# Patient Record
Sex: MALE | Race: WHITE | NOT HISPANIC OR LATINO | Employment: FULL TIME | URBAN - METROPOLITAN AREA
[De-identification: names, ages, dates, MRNs, and addresses within clinical notes are randomized per-mention and may not be internally consistent; named-entity substitution may affect disease eponyms.]

---

## 2017-01-24 ENCOUNTER — TELEPHONE (OUTPATIENT)
Dept: FAMILY MEDICINE | Facility: CLINIC | Age: 52
End: 2017-01-24

## 2017-01-24 ENCOUNTER — OFFICE VISIT (OUTPATIENT)
Dept: FAMILY MEDICINE | Facility: CLINIC | Age: 52
End: 2017-01-24
Payer: COMMERCIAL

## 2017-01-24 ENCOUNTER — LAB VISIT (OUTPATIENT)
Dept: LAB | Facility: HOSPITAL | Age: 52
End: 2017-01-24
Attending: NURSE PRACTITIONER
Payer: COMMERCIAL

## 2017-01-24 VITALS
HEART RATE: 70 BPM | TEMPERATURE: 98 F | HEIGHT: 71 IN | DIASTOLIC BLOOD PRESSURE: 116 MMHG | SYSTOLIC BLOOD PRESSURE: 213 MMHG | BODY MASS INDEX: 44.1 KG/M2 | WEIGHT: 315 LBS

## 2017-01-24 DIAGNOSIS — I10 ESSENTIAL HYPERTENSION: Primary | ICD-10-CM

## 2017-01-24 DIAGNOSIS — I10 ESSENTIAL HYPERTENSION: ICD-10-CM

## 2017-01-24 LAB
ALBUMIN SERPL BCP-MCNC: 4.1 G/DL
ALP SERPL-CCNC: 87 U/L
ALT SERPL W/O P-5'-P-CCNC: 31 U/L
ANION GAP SERPL CALC-SCNC: 12 MMOL/L
AST SERPL-CCNC: 24 U/L
BILIRUB SERPL-MCNC: 0.6 MG/DL
BUN SERPL-MCNC: 16 MG/DL
CALCIUM SERPL-MCNC: 9.8 MG/DL
CHLORIDE SERPL-SCNC: 104 MMOL/L
CO2 SERPL-SCNC: 26 MMOL/L
CREAT SERPL-MCNC: 1 MG/DL
EST. GFR  (AFRICAN AMERICAN): >60 ML/MIN/1.73 M^2
EST. GFR  (NON AFRICAN AMERICAN): >60 ML/MIN/1.73 M^2
GLUCOSE SERPL-MCNC: 93 MG/DL
POTASSIUM SERPL-SCNC: 4.5 MMOL/L
PROT SERPL-MCNC: 7.6 G/DL
SODIUM SERPL-SCNC: 142 MMOL/L

## 2017-01-24 PROCEDURE — 80053 COMPREHEN METABOLIC PANEL: CPT

## 2017-01-24 PROCEDURE — 36415 COLL VENOUS BLD VENIPUNCTURE: CPT | Mod: PO

## 2017-01-24 PROCEDURE — 99213 OFFICE O/P EST LOW 20 MIN: CPT | Mod: S$GLB,,, | Performed by: NURSE PRACTITIONER

## 2017-01-24 PROCEDURE — 3077F SYST BP >= 140 MM HG: CPT | Mod: S$GLB,,, | Performed by: NURSE PRACTITIONER

## 2017-01-24 PROCEDURE — 1159F MED LIST DOCD IN RCRD: CPT | Mod: S$GLB,,, | Performed by: NURSE PRACTITIONER

## 2017-01-24 PROCEDURE — 99999 PR PBB SHADOW E&M-EST. PATIENT-LVL III: CPT | Mod: PBBFAC,,, | Performed by: NURSE PRACTITIONER

## 2017-01-24 PROCEDURE — 3080F DIAST BP >= 90 MM HG: CPT | Mod: S$GLB,,, | Performed by: NURSE PRACTITIONER

## 2017-01-24 RX ORDER — LISINOPRIL 40 MG/1
40 TABLET ORAL DAILY
Qty: 90 TABLET | Refills: 1 | Status: SHIPPED | OUTPATIENT
Start: 2017-01-24 | End: 2017-07-20 | Stop reason: SDUPTHER

## 2017-01-24 RX ORDER — CLONIDINE HYDROCHLORIDE 0.1 MG/1
0.2 TABLET ORAL
Status: DISCONTINUED | OUTPATIENT
Start: 2017-01-24 | End: 2017-02-10

## 2017-01-24 RX ORDER — ATENOLOL AND CHLORTHALIDONE TABLET 50; 25 MG/1; MG/1
1 TABLET ORAL DAILY
Qty: 30 TABLET | Refills: 11 | Status: SHIPPED | OUTPATIENT
Start: 2017-01-24 | End: 2017-02-10 | Stop reason: DRUGHIGH

## 2017-01-24 NOTE — PROGRESS NOTES
Tried to administer Clonidine 0.2 mg to patient; he refused, stating this blood pressure is regular for him.

## 2017-01-24 NOTE — TELEPHONE ENCOUNTER
----- Message from Muriel Castrejon sent at 1/24/2017  4:57 PM CST -----  Contact: SELF [phone]  Patient stopped by the clinic and stated that his lisinopril was not refilled and he is out. Patient needs this as soon as possible. Please call [phone] with questions.

## 2017-01-24 NOTE — PATIENT INSTRUCTIONS
Controlling High Blood Pressure  High blood pressure (hypertension) is often called the silent killer. This is because many people who have it dont know it. High blood pressure is defined as 140/90 mm Hg or higher. Know your blood pressure and remember to check it regularly. Doing so can save your life. Here are some things you can do to help control your blood pressure.    Choose heart-healthy foods  · Select low-salt, low-fat foods. Limit sodium intake to 2,400 mg per day or the amount suggested by your healthcare provider.  · Limit canned, dried, cured, packaged, and fast foods. These can contain a lot of salt.  · Eat 8 to 10 servings of fruits and vegetables every day.  · Choose lean meats, fish, or chicken.  · Eat whole-grain pasta, brown rice, and beans.  · Eat 2 to 3 servings of low-fat or fat-free dairy products.  · Ask your doctor about the DASH eating plan. This plan helps reduce blood pressure.  · When you go to a restaurant, ask that your meal be prepared with no added salt.  Maintain a healthy weight  · Ask your healthcare provider how many calories to eat a day. Then stick to that number.  · Ask your healthcare provider what weight range is healthiest for you. If you are overweight, a weight loss of only 3% to 5% of your body weight can help lower blood pressure. Generally, a good weight loss goal is to lose 10% of your body weight in a year.  · Limit snacks and sweets.  · Get regular exercise.  Get up and get active  · Choose activities you enjoy. Find ones you can do with friends or family. This includes bicycling, dancing, walking, and jogging.  · Park farther away from building entrances.  · Use stairs instead of the elevator.  · When you can, walk or bike instead of driving.  · [place] leaves, garden, or do household repairs.  · Be active at a moderate to vigorous level of physical activity for at least 40 minutes for a minimum of 3 to 4 days a week.   Manage stress  · Make time to relax and enjoy  life. Find time to laugh.  · Communicate your concerns with your loved ones and your healthcare provider.  · Visit with family and friends, and keep up with hobbies.  Limit alcohol and quit smoking  · Men should have no more than 2 drinks per day.  · Women should have no more than 1 drink per day.  · Talk with your healthcare provider about quitting smoking. Smoking significantly increases your risk for heart disease and stroke. Ask your healthcare provider about community smoking cessation programs and other options.  Medicines  If lifestyle changes arent enough, your healthcare provider may prescribe high blood pressure medicine. Take all medicines as prescribed. If you have any questions about your medicines, ask your healthcare provider before stopping or changing them.   © 8804-7331 [employer]. [address] [place] [address], [place] [address]. All rights reserved. This information is not intended as a substitute for professional medical care. Always follow your healthcare professional's instructions.        Weight Management: Getting Started  Healthy bodies come in all shapes and sizes. Not all bodies are made to be thin. For some people, a healthy weight is higher than the average weight listed on weight charts. Your healthcare provider can help you decide on a healthy weight for you.    Reasons to lose weight  Losing weight can help with some health problems, such as high blood pressure, heart disease, diabetes, sleep apnea, and arthritis. You may also feel more energy.  Set your long-term goal  Your goal doesn't even have to be a specific weight. You may decide on a fitness goal (such as being able to walk 10 miles a week), or a health goal (such as lowering your blood pressure). Choose a goal that is measurable and reasonable, so you know when you've reached it. A goal of reaching a BMI of less than 25 is not always reasonable (or possible).   Make an action plan  Habits dont change overnight.  Setting your goals too high can leave you feeling discouraged if you cant reach them. Be realistic. Choose one or two small changes you can make now. Set an action plan for how you are going to make these changes. When you can stick to this plan, keep making a few more small changes. Taking small steps will help you stay on the path to success.  Track your progress  Write down your goals. Then, keep a daily record of your progress. Write down what you eat and how active you are. This record lets you look back on how much youve done. It may also help when youre feeling frustrated. Reward yourself for success. Even if you dont reach every goal, give yourself credit for what you do get done.  Get support  Encouragement from others can help make losing weight easier. Ask your family members and friends for support. They may even want to join you. Also look to your healthcare provider, registered dietitian, and  for help. Your local hospital can give you more information about nutrition, exercise, and weight loss.  © 7022-2496 [employer]. [address] [place] [address], [place] [address]. All rights reserved. This information is not intended as a substitute for professional medical care. Always follow your healthcare professional's instructions.        Walking for Fitness  Fitness walking has something for everyone, even people who are already fit. Walking is one of the safest ways to condition your body aerobically. It can boost energy, help you lose weight, and reduce stress.    Physical benefits  · Walking strengthens your heart and lungs, and tones your muscles.  · When walking, your feet land with less impact than in other sports. This reduces chances of muscle, bone, and joint injury.  · Regular walking improves your cholesterol levels and lowers your risk of heart disease. And it helps you control your blood sugar if you have diabetes.  · Walking is a weight-bearing activity, which  helps maintain bone density. This can help prevent osteoporosis.  Personal rewards  · Taking walks can help you relax and manage stress. And fitness walking may make you feel better about yourself.  · Walking can help you sleep better at night and make you less likely to be depressed.  · Regular walking may help maintain your memory as you get older.  · Walking is a great way to spend extra time with friends and family members. Be sure to invite your dog along!  Q&A about fitness walking  Q: Will walking keep me fit?  A: Yes. Regular walking at the right pace gives you all the benefits of other aerobic activities, such as jogging and swimming.  Q: Will walking help me lose weight and keep it off?  A: Yes. Per mile, walking can burn as many calories as jogging. Your health care provider can help work walking into your weight-loss plan.  Q: Is walking safe for my health?  A: Yes. Walking is safe if you have high blood pressure, diabetes, heart disease, or other conditions. Talk to your health care provider before you start.  © 0172-7633 [employer]. [address] [place] [address]. All rights reserved. This information is not intended as a substitute for professional medical care. Always follow your healthcare professional's instructions.        Low-Salt Diet  This diet removes foods that are high in salt. It also limits the amount of salt you use when cooking. It is most often used for people with high blood pressure, edema (fluid retention), and kidney, liver, or heart disease.  Table salt contains the mineral sodium. Your body needs sodium to work normally. But too much sodium can make your health problems worse. Your healthcare provider is recommending a low-salt (also called low-sodium) diet for you. Your total daily allowance of salt is 1,500 to 2,300 milligrams (mg). It is less than 1 teaspoon of table salt. This means you can have only about 500 to 700 mg of sodium at each meal. People  with certain health problems should limit salt intake to the lower end of the recommended range.    When you cook, dont add much salt. If you can cook without using salt, even better. Dont add salt to your food at the table.  When shopping, read food labels. Salt is often called sodium on the label. Choose foods that are salt-free, low salt, or very low salt. Note that foods with reduced salt may not lower your salt intake enough.    Beans, potatoes, and pasta  Ok: Dry beans, split peas, lentils, potatoes, rice, macaroni, pasta, spaghetti without added salt  Avoid: Potato chips, tortilla chips, and similar products  Breads and cereals  Ok: Low-sodium breads, rolls, cereals, and cakes; low-salt crackers, matzo crackers  Avoid: Salted crackers, pretzels, popcorn, Filipino toast, pancakes, muffins  Dairy  Ok: Milk, chocolate milk, hot chocolate mix, low-salt cheeses, and yogurt  Avoid: Processed cheese and cheese spreads; Roquefort, Camembert, and cottage cheese; buttermilk, instant breakfast drink  Desserts  Ok: Ice cream, frozen yogurt, juice bars, gelatin, cookies and pies, sugar, honey, jelly, hard candy  Avoid: Most pies, cakes and cookies prepared or processed with salt; instant pudding  Drinks  Ok: Tea, coffee, fizzy (carbonated) drinks, juices  Avoid: Flavored coffees, electrolyte replacement drinks, sports drinks  Meats  Ok: All fresh meat, fish, poultry, low-salt tuna, eggs, egg substitute  Avoid: Smoked, pickled, brine-cured, or salted meats and fish. This includes naqvi, chipped beef, corned beef, hot dogs, deli meats, ham, kosher meats, salt pork, sausage, canned tuna, salted codfish, smoked salmon, herring, sardines, or anchovies.  Seasonings and spices  Ok: Most seasonings are okay. Good substitutes for salt include: fresh herb blends, hot sauce, lemon, garlic, ross, vinegar, dry mustard, parsley, cilantro, horseradish, tomato paste, regular margarine, mayonnaise, unsalted butter, cream cheese,  vegetable oil, cream, low-salt salad dressing and gravy.  Avoid: Regular ketchup, relishes, pickles, soy sauce, teriyaki sauce, Worcestershire sauce, BBQ sauce, tartar sauce, meat tenderizer, chili sauce, regular gravy, regular salad dressing, salted butter  Soups  Ok: Low-salt soups and broths made with allowed foods  Avoid: Bouillon cubes, soups with smoked or salted meats, regular soup and broth  Vegetables  Ok: Most vegetables are okay; also low-salt tomato and vegetable juices  Avoid: Sauerkraut and other brine-soaked vegetables; pickles and other pickled vegetables; tomato juice, olives  © 2323-8344 [employer]. [address] [place] [address], [place] [address]. All rights reserved. This information is not intended as a substitute for professional medical care. Always follow your healthcare professional's instructions.

## 2017-01-24 NOTE — PROGRESS NOTES
"Subjective:       Patient ID: Ron Shipman is a 51 y.o. male.    Chief Complaint: Hypertension    HPI Comments: Mr. Shipman presents to the clinic today for follow up for hypertension which has been present since age 18.  He is accompanied by significant other.  He has severely elevated blood pressure and denies any chest pain, blurry vision, headache.  He does have occasional leg swelling.  He eats hot dogs and sauerkraut, lasagna.  He has an active job but does no routine exercise.  He had CMP at last visit and kidney function was good.  He states today his blood pressure is "good" for him.  He has prescription for furosemide and has not been taking this because it was not helping the leg swelling.  He states he has had "dye shot through  Me" for blood pressure in the past and he has seen a Cardiologist.  He has not seen a Nephrologist.  He has cut out alcohol during the week.      Review of Systems   Constitutional: Negative for chills, fatigue and fever.   HENT: Negative for congestion, ear pain and sinus pressure.    Eyes: Negative for visual disturbance.   Respiratory: Negative for cough, shortness of breath and wheezing.    Cardiovascular: Positive for leg swelling (occasional). Negative for chest pain and palpitations.   Gastrointestinal: Negative for abdominal pain, constipation and diarrhea.   Neurological: Negative for dizziness, light-headedness and numbness.       Objective:      Physical Exam   Constitutional: He is oriented to person, place, and time. He appears well-developed and well-nourished. No distress.   HENT:   Head: Normocephalic and atraumatic.   Right Ear: External ear normal.   Left Ear: External ear normal.   Mouth/Throat: Oropharynx is clear and moist. No oropharyngeal exudate.   Eyes: Pupils are equal, round, and reactive to light. Right eye exhibits no discharge. Left eye exhibits no discharge.   Neck: Neck supple. No thyromegaly present.   Cardiovascular: Normal rate and regular " rhythm.  Exam reveals no gallop and no friction rub.    No murmur heard.  No lower extremity edema   Pulmonary/Chest: Effort normal and breath sounds normal. No respiratory distress. He has no wheezes. He has no rales.   Lymphadenopathy:     He has no cervical adenopathy.   Neurological: He is alert and oriented to person, place, and time. Coordination normal.   Skin: Skin is warm and dry.   Psychiatric: He has a normal mood and affect. His behavior is normal. Thought content normal.   Vitals reviewed.          Current Outpatient Prescriptions:     esomeprazole (NEXIUM) 40 MG capsule, Take 40 mg by mouth before breakfast., Disp: , Rfl:     lisinopril (PRINIVIL,ZESTRIL) 40 MG tablet, Take 1 tablet (40 mg total) by mouth once daily., Disp: 90 tablet, Rfl: 1    pravastatin (PRAVACHOL) 40 MG tablet, Take 40 mg by mouth once daily., Disp: , Rfl:     atenolol-chlorthalidone (TENORETIC) 50-25 mg Tab, Take 1 tablet by mouth once daily., Disp: 30 tablet, Rfl: 11    Current Facility-Administered Medications:     cloNIDine tablet 0.2 mg, 0.2 mg, Oral, 1 time in Clinic/HOD, Yue Lou, FNP-C  Assessment:       1. Essential hypertension        Plan:     Essential hypertension  Patient is unconcerned his blood pressure is so high.  Patient was told multiple times during visit that this severity of high blood pressure could cause heart attack, stroke, kidney disease, death.  He does not want to take the clonidine in the office today because his blood pressure is lower than usual.  He was advised his blood pressure goal is <140/90.  He still refuses.  DASH diet instructions given verbally and via handout.    -     atenolol-chlorthalidone (TENORETIC) 50-25 mg Tab; Take 1 tablet by mouth once daily.  Dispense: 30 tablet; Refill: 11  -     cloNIDine tablet 0.2 mg; Take 2 tablets (0.2 mg total) by mouth one time.  -     Comprehensive metabolic panel; Future; Expected date: 1/24/17  -     Ambulatory referral to Outpatient Case  Management

## 2017-01-24 NOTE — TELEPHONE ENCOUNTER
----- Message from Haley Durham sent at 1/24/2017  4:48 PM CST -----  Pt called stated that he was just seen and his BP meds was suppose to be called and it was not at the pharmacy/pls call back at [phone]

## 2017-01-24 NOTE — MR AVS SNAPSHOT
[facility]  [address] [place] [address]  Phone: [phone]  Fax: [phone]                  Ron Shipman   2017 2:20 PM   Office Visit    Description:  Male : 1965   Provider:  VANESA Melara   Department:  [facility]           Reason for Visit     Hypertension           Diagnoses this Visit        Comments    Essential hypertension    -  Primary            To Do List           Future Appointments        Provider Department Dept Phone    2/10/2017 4:00 PM VANESA Melara [facility] [phone]    2017 3:40 PM Liss Raza MD [facility] [phone]      Goals (5 Years of Data)     None      Follow-Up and Disposition     Return for labs now.       These Medications        Disp Refills Start End    atenolol-chlorthalidone (TENORETIC) 50-25 mg Tab 30 tablet 11 2017    Take 1 tablet by mouth once daily. - Oral    Pharmacy: [facility]/pharmacy #[id number] - [place] - [address] Ph #: [phone]         [facility] On Call     [facility] On Call Nurse [facility] -  Assistance  Registered nurses in the [facility] On Call Center provide clinical advisement, health education, appointment booking, and other advisory services.  Call for this free service at [phone].             Medications           Message regarding Medications     Verify the changes and/or additions to your medication regime listed below are the same as discussed with your clinician today.  If any of these changes or additions are incorrect, please notify your healthcare provider.        START taking these NEW medications        Refills    atenolol-chlorthalidone (TENORETIC) 50-25 mg Tab 11    Sig: Take 1 tablet by mouth once daily.    Class: Normal    Route: Oral      These medications were administered today        Dose Freq    cloNIDine tablet 0.2 mg 0.2 mg Clinic/HOD 1 time    Sig: Take 2 tablets (0.2 mg total) by  "mouth one time.    Class: Normal    Route: Oral      STOP taking these medications     atenolol (TENORMIN) 25 MG tablet Take 1 tablet (25 mg total) by mouth once daily.           Verify that the below list of medications is an accurate representation of the medications you are currently taking.  If none reported, the list may be blank. If incorrect, please contact your healthcare provider. Carry this list with you in case of emergency.           Current Medications     esomeprazole (NEXIUM) 40 MG capsule Take 40 mg by mouth before breakfast.    lisinopril (PRINIVIL,ZESTRIL) 40 MG tablet Take 1 tablet (40 mg total) by mouth once daily.    pravastatin (PRAVACHOL) 40 MG tablet Take 40 mg by mouth once daily.    atenolol-chlorthalidone (TENORETIC) 50-25 mg Tab Take 1 tablet by mouth once daily.           Clinical Reference Information           Vital Signs - Last Recorded  Most recent update: 1/24/2017  2:25 PM by Sima Saavedra MA    BP Pulse Temp Ht Wt BMI    (!) 213/116 (BP Location: Right arm, Patient Position: Sitting, BP Method: Automatic) 70 97.8 °F (36.6 °C) (Oral) 5' 11" (1.803 m) (!) 143 kg (315 lb 4.1 oz) 43.97 kg/m2      Blood Pressure          Most Recent Value    BP  (!)  213/116      Allergies as of 1/24/2017     Tetanus Vaccines And Toxoid      Immunizations Administered on Date of Encounter - 1/24/2017     None      Orders Placed During Today's Visit      Normal Orders This Visit    Ambulatory referral to Outpatient Case Management     Future Labs/Procedures Expected by Expires    Comprehensive metabolic panel  1/24/2017 3/25/2018      Instructions      Controlling High Blood Pressure  High blood pressure (hypertension) is often called the silent killer. This is because many people who have it dont know it. High blood pressure is defined as 140/90 mm Hg or higher. Know your blood pressure and remember to check it regularly. Doing so can save your life. Here are some things you can do to help control " your blood pressure.    Choose heart-healthy foods  · Select low-salt, low-fat foods. Limit sodium intake to 2,400 mg per day or the amount suggested by your healthcare provider.  · Limit canned, dried, cured, packaged, and fast foods. These can contain a lot of salt.  · Eat 8 to 10 servings of fruits and vegetables every day.  · Choose lean meats, fish, or chicken.  · Eat whole-grain pasta, brown rice, and beans.  · Eat 2 to 3 servings of low-fat or fat-free dairy products.  · Ask your doctor about the DASH eating plan. This plan helps reduce blood pressure.  · When you go to a restaurant, ask that your meal be prepared with no added salt.  Maintain a healthy weight  · Ask your healthcare provider how many calories to eat a day. Then stick to that number.  · Ask your healthcare provider what weight range is healthiest for you. If you are overweight, a weight loss of only 3% to 5% of your body weight can help lower blood pressure. Generally, a good weight loss goal is to lose 10% of your body weight in a year.  · Limit snacks and sweets.  · Get regular exercise.  Get up and get active  · Choose activities you enjoy. Find ones you can do with friends or family. This includes bicycling, dancing, walking, and jogging.  · Park farther away from building entrances.  · Use stairs instead of the elevator.  · When you can, walk or bike instead of driving.  · [place] leaves, garden, or do household repairs.  · Be active at a moderate to vigorous level of physical activity for at least 40 minutes for a minimum of 3 to 4 days a week.   Manage stress  · Make time to relax and enjoy life. Find time to laugh.  · Communicate your concerns with your loved ones and your healthcare provider.  · Visit with family and friends, and keep up with hobbies.  Limit alcohol and quit smoking  · Men should have no more than 2 drinks per day.  · Women should have no more than 1 drink per day.  · Talk with your healthcare provider about quitting  smoking. Smoking significantly increases your risk for heart disease and stroke. Ask your healthcare provider about community smoking cessation programs and other options.  Medicines  If lifestyle changes arent enough, your healthcare provider may prescribe high blood pressure medicine. Take all medicines as prescribed. If you have any questions about your medicines, ask your healthcare provider before stopping or changing them.   © 4135-2961 [employer]. [address] [place] [address], [place] [address]. All rights reserved. This information is not intended as a substitute for professional medical care. Always follow your healthcare professional's instructions.        Weight Management: Getting Started  Healthy bodies come in all shapes and sizes. Not all bodies are made to be thin. For some people, a healthy weight is higher than the average weight listed on weight charts. Your healthcare provider can help you decide on a healthy weight for you.    Reasons to lose weight  Losing weight can help with some health problems, such as high blood pressure, heart disease, diabetes, sleep apnea, and arthritis. You may also feel more energy.  Set your long-term goal  Your goal doesn't even have to be a specific weight. You may decide on a fitness goal (such as being able to walk 10 miles a week), or a health goal (such as lowering your blood pressure). Choose a goal that is measurable and reasonable, so you know when you've reached it. A goal of reaching a BMI of less than 25 is not always reasonable (or possible).   Make an action plan  Habits dont change overnight. Setting your goals too high can leave you feeling discouraged if you cant reach them. Be realistic. Choose one or two small changes you can make now. Set an action plan for how you are going to make these changes. When you can stick to this plan, keep making a few more small changes. Taking small steps will help you stay on the path to success.  Track  your progress  Write down your goals. Then, keep a daily record of your progress. Write down what you eat and how active you are. This record lets you look back on how much youve done. It may also help when youre feeling frustrated. Reward yourself for success. Even if you dont reach every goal, give yourself credit for what you do get done.  Get support  Encouragement from others can help make losing weight easier. Ask your family members and friends for support. They may even want to join you. Also look to your healthcare provider, registered dietitian, and  for help. Your local hospital can give you more information about nutrition, exercise, and weight loss.  © 9591-8398 [employer]. [address] [place] [address], [place] [address]. All rights reserved. This information is not intended as a substitute for professional medical care. Always follow your healthcare professional's instructions.        Walking for Fitness  Fitness walking has something for everyone, even people who are already fit. Walking is one of the safest ways to condition your body aerobically. It can boost energy, help you lose weight, and reduce stress.    Physical benefits  · Walking strengthens your heart and lungs, and tones your muscles.  · When walking, your feet land with less impact than in other sports. This reduces chances of muscle, bone, and joint injury.  · Regular walking improves your cholesterol levels and lowers your risk of heart disease. And it helps you control your blood sugar if you have diabetes.  · Walking is a weight-bearing activity, which helps maintain bone density. This can help prevent osteoporosis.  Personal rewards  · Taking walks can help you relax and manage stress. And fitness walking may make you feel better about yourself.  · Walking can help you sleep better at night and make you less likely to be depressed.  · Regular walking may help maintain your memory as you get  older.  · Walking is a great way to spend extra time with friends and family members. Be sure to invite your dog along!  Q&A about fitness walking  Q: Will walking keep me fit?  A: Yes. Regular walking at the right pace gives you all the benefits of other aerobic activities, such as jogging and swimming.  Q: Will walking help me lose weight and keep it off?  A: Yes. Per mile, walking can burn as many calories as jogging. Your health care provider can help work walking into your weight-loss plan.  Q: Is walking safe for my health?  A: Yes. Walking is safe if you have high blood pressure, diabetes, heart disease, or other conditions. Talk to your health care provider before you start.  © 1155-9554 [employer]. [address] [place] [address], [place] [address]. All rights reserved. This information is not intended as a substitute for professional medical care. Always follow your healthcare professional's instructions.        Low-Salt Diet  This diet removes foods that are high in salt. It also limits the amount of salt you use when cooking. It is most often used for people with high blood pressure, edema (fluid retention), and kidney, liver, or heart disease.  Table salt contains the mineral sodium. Your body needs sodium to work normally. But too much sodium can make your health problems worse. Your healthcare provider is recommending a low-salt (also called low-sodium) diet for you. Your total daily allowance of salt is 1,500 to 2,300 milligrams (mg). It is less than 1 teaspoon of table salt. This means you can have only about 500 to 700 mg of sodium at each meal. People with certain health problems should limit salt intake to the lower end of the recommended range.    When you cook, dont add much salt. If you can cook without using salt, even better. Dont add salt to your food at the table.  When shopping, read food labels. Salt is often called sodium on the label. Choose foods that are salt-free, low salt,  or very low salt. Note that foods with reduced salt may not lower your salt intake enough.    Beans, potatoes, and pasta  Ok: Dry beans, split peas, lentils, potatoes, rice, macaroni, pasta, spaghetti without added salt  Avoid: Potato chips, tortilla chips, and similar products  Breads and cereals  Ok: Low-sodium breads, rolls, cereals, and cakes; low-salt crackers, matzo crackers  Avoid: Salted crackers, pretzels, popcorn, [place] toast, pancakes, muffins  Dairy  Ok: Milk, chocolate milk, hot chocolate mix, low-salt cheeses, and yogurt  Avoid: Processed cheese and cheese spreads; Roquefort, Camembert, and cottage cheese; buttermilk, instant breakfast drink  Desserts  Ok: Ice cream, frozen yogurt, juice bars, gelatin, cookies and pies, sugar, honey, jelly, hard candy  Avoid: Most pies, cakes and cookies prepared or processed with salt; instant pudding  Drinks  Ok: Tea, coffee, fizzy (carbonated) drinks, juices  Avoid: Flavored coffees, electrolyte replacement drinks, sports drinks  Meats  Ok: All fresh meat, fish, poultry, low-salt tuna, eggs, egg substitute  Avoid: Smoked, pickled, brine-cured, or salted meats and fish. This includes naqvi, chipped beef, corned beef, hot dogs, deli meats, ham, kosher meats, salt pork, sausage, canned tuna, salted codfish, smoked salmon, herring, sardines, or anchovies.  Seasonings and spices  Ok: Most seasonings are okay. Good substitutes for salt include: fresh herb blends, hot sauce, lemon, garlic, ross, vinegar, dry mustard, parsley, cilantro, horseradish, tomato paste, regular margarine, mayonnaise, unsalted butter, cream cheese, vegetable oil, cream, low-salt salad dressing and gravy.  Avoid: Regular ketchup, relishes, pickles, soy sauce, teriyaki sauce, Worcestershire sauce, BBQ sauce, tartar sauce, meat tenderizer, chili sauce, regular gravy, regular salad dressing, salted butter  Soups  Ok: Low-salt soups and broths made with allowed foods  Avoid: Bouillon cubes, soups with  smoked or salted meats, regular soup and broth  Vegetables  Ok: Most vegetables are okay; also low-salt tomato and vegetable juices  Avoid: Sauerkraut and other brine-soaked vegetables; pickles and other pickled vegetables; tomato juice, olives  © 8789-9707 [employer]. [address] [place] [address]. All rights reserved. This information is not intended as a substitute for professional medical care. Always follow your healthcare professional's instructions.

## 2017-01-25 ENCOUNTER — OUTPATIENT CASE MANAGEMENT (OUTPATIENT)
Dept: ADMINISTRATIVE | Facility: OTHER | Age: 52
End: 2017-01-25

## 2017-01-26 ENCOUNTER — TELEPHONE (OUTPATIENT)
Dept: FAMILY MEDICINE | Facility: CLINIC | Age: 52
End: 2017-01-26

## 2017-01-26 NOTE — TELEPHONE ENCOUNTER
----- Message from Kena Diamond RN sent at 1/25/2017  8:28 PM CST -----  Please note the following patient has been assigned to Abhijit NI with Outpatient Complex Care Mgmt for screening.      Please contact [facility] at ext [phone] with questions.     Thank you.  Kena Diamond

## 2017-01-26 NOTE — PROGRESS NOTES
Please note the following patient has been assigned to Abhijit NI with Outpatient Complex Care Mgmt for screening.      Please contact [facility] at ext [phone] with questions.     Thank you.  Kena Diamond

## 2017-02-10 ENCOUNTER — OFFICE VISIT (OUTPATIENT)
Dept: FAMILY MEDICINE | Facility: CLINIC | Age: 52
End: 2017-02-10
Payer: COMMERCIAL

## 2017-02-10 VITALS
TEMPERATURE: 98 F | SYSTOLIC BLOOD PRESSURE: 152 MMHG | HEART RATE: 86 BPM | DIASTOLIC BLOOD PRESSURE: 90 MMHG | HEIGHT: 71 IN | BODY MASS INDEX: 44.1 KG/M2 | WEIGHT: 315 LBS

## 2017-02-10 DIAGNOSIS — F10.10 ALCOHOL ABUSE, DAILY USE: ICD-10-CM

## 2017-02-10 DIAGNOSIS — I10 ESSENTIAL HYPERTENSION: Primary | ICD-10-CM

## 2017-02-10 DIAGNOSIS — E66.01 MORBID OBESITY DUE TO EXCESS CALORIES: ICD-10-CM

## 2017-02-10 PROCEDURE — 3078F DIAST BP <80 MM HG: CPT | Mod: S$GLB,,, | Performed by: NURSE PRACTITIONER

## 2017-02-10 PROCEDURE — 99999 PR PBB SHADOW E&M-EST. PATIENT-LVL III: CPT | Mod: PBBFAC,,, | Performed by: NURSE PRACTITIONER

## 2017-02-10 PROCEDURE — 3077F SYST BP >= 140 MM HG: CPT | Mod: S$GLB,,, | Performed by: NURSE PRACTITIONER

## 2017-02-10 PROCEDURE — 99213 OFFICE O/P EST LOW 20 MIN: CPT | Mod: S$GLB,,, | Performed by: NURSE PRACTITIONER

## 2017-02-10 RX ORDER — ATENOLOL AND CHLORTHALIDONE TABLET 100; 25 MG/1; MG/1
1 TABLET ORAL DAILY
Qty: 30 TABLET | Refills: 11 | Status: SHIPPED | OUTPATIENT
Start: 2017-02-10 | End: 2017-04-21 | Stop reason: SDUPTHER

## 2017-02-10 NOTE — PATIENT INSTRUCTIONS

## 2017-02-10 NOTE — MR AVS SNAPSHOT
[facility]  [address] [place] [address]  Phone: [phone]  Fax: [phone]                  Ron Shipman   2/10/2017 4:00 PM   Office Visit    Description:  Male : 1965   Provider:  VANESA Melara   Department:  [facility]           Reason for Visit     Hypertension           Diagnoses this Visit        Comments    Essential hypertension                To Do List           Future Appointments        Provider Department Dept Phone    3/10/2017 3:00 PM VANESA Melara [facility] [phone]    2017 3:40 PM Liss Raza MD [facility] [phone]      Goals (5 Years of Data)     None      Follow-Up and Disposition     Return in about 4 weeks (around 3/10/2017) for nurse visit bp .       These Medications        Disp Refills Start End    atenolol-chlorthalidone (TENORETIC) 100-25 mg per tablet 30 tablet 11 2/10/2017 2/10/2018    Take 1 tablet by mouth once daily. - Oral    Pharmacy: [facility]/pharmacy #[id number] - [place] - [address] [facility] Ph #: [phone]         [facility] On Call     [facility] On Call Nurse Care Line -  Assistance  Registered nurses in the [facility] On Call Center provide clinical advisement, health education, appointment booking, and other advisory services.  Call for this free service at [phone].             Medications           Message regarding Medications     Verify the changes and/or additions to your medication regime listed below are the same as discussed with your clinician today.  If any of these changes or additions are incorrect, please notify your healthcare provider.        START taking these NEW medications        Refills    atenolol-chlorthalidone (TENORETIC) 100-25 mg per tablet 11    Sig: Take 1 tablet by mouth once daily.    Class: Normal    Route: Oral      STOP taking these medications     atenolol-chlorthalidone (TENORETIC) 50-25 mg Tab Take 1 tablet by  "mouth once daily.           Verify that the below list of medications is an accurate representation of the medications you are currently taking.  If none reported, the list may be blank. If incorrect, please contact your healthcare provider. Carry this list with you in case of emergency.           Current Medications     esomeprazole (NEXIUM) 40 MG capsule Take 40 mg by mouth before breakfast.    lisinopril (PRINIVIL,ZESTRIL) 40 MG tablet Take 1 tablet (40 mg total) by mouth once daily.    pravastatin (PRAVACHOL) 40 MG tablet Take 40 mg by mouth once daily.    atenolol-chlorthalidone (TENORETIC) 100-25 mg per tablet Take 1 tablet by mouth once daily.           Clinical Reference Information           Your Vitals Were     BP Pulse Temp Height Weight BMI    152/90 86 98.2 °F (36.8 °C) (Oral) 5' 11" (1.803 m) 144 kg (317 lb 7.4 oz) 44.28 kg/m2      Blood Pressure          Most Recent Value    BP  (!)  152/90      Allergies as of 2/10/2017     Tetanus Vaccines And Toxoid      Immunizations Administered on Date of Encounter - 2/10/2017     None      Instructions      Low-Salt Diet  This diet removes foods that are high in salt. It also limits the amount of salt you use when cooking. It is most often used for people with high blood pressure, edema (fluid retention), and kidney, liver, or heart disease.  Table salt contains the mineral sodium. Your body needs sodium to work normally. But too much sodium can make your health problems worse. Your healthcare provider is recommending a low-salt (also called low-sodium) diet for you. Your total daily allowance of salt is 1,500 to 2,300 milligrams (mg). It is less than 1 teaspoon of table salt. This means you can have only about 500 to 700 mg of sodium at each meal. People with certain health problems should limit salt intake to the lower end of the recommended range.    When you cook, dont add much salt. If you can cook without using salt, even better. Dont add salt to your " food at the table.  When shopping, read food labels. Salt is often called sodium on the label. Choose foods that are salt-free, low salt, or very low salt. Note that foods with reduced salt may not lower your salt intake enough.    Beans, potatoes, and pasta  Ok: Dry beans, split peas, lentils, potatoes, rice, macaroni, pasta, spaghetti without added salt  Avoid: Potato chips, tortilla chips, and similar products  Breads and cereals  Ok: Low-sodium breads, rolls, cereals, and cakes; low-salt crackers, matzo crackers  Avoid: Salted crackers, pretzels, popcorn, Yoruba toast, pancakes, muffins  Dairy  Ok: Milk, chocolate milk, hot chocolate mix, low-salt cheeses, and yogurt  Avoid: Processed cheese and cheese spreads; Roquefort, Camembert, and cottage cheese; buttermilk, instant breakfast drink  Desserts  Ok: Ice cream, frozen yogurt, juice bars, gelatin, cookies and pies, sugar, honey, jelly, hard candy  Avoid: Most pies, cakes and cookies prepared or processed with salt; instant pudding  Drinks  Ok: Tea, coffee, fizzy (carbonated) drinks, juices  Avoid: Flavored coffees, electrolyte replacement drinks, sports drinks  Meats  Ok: All fresh meat, fish, poultry, low-salt tuna, eggs, egg substitute  Avoid: Smoked, pickled, brine-cured, or salted meats and fish. This includes naqvi, chipped beef, corned beef, hot dogs, deli meats, ham, kosher meats, salt pork, sausage, canned tuna, salted codfish, smoked salmon, herring, sardines, or anchovies.  Seasonings and spices  Ok: Most seasonings are okay. Good substitutes for salt include: fresh herb blends, hot sauce, lemon, garlic, ross, vinegar, dry mustard, parsley, cilantro, horseradish, tomato paste, regular margarine, mayonnaise, unsalted butter, cream cheese, vegetable oil, cream, low-salt salad dressing and gravy.  Avoid: Regular ketchup, relishes, pickles, soy sauce, teriyaki sauce, Worcestershire sauce, BBQ sauce, tartar sauce, meat tenderizer, chili sauce, regular  gravy, regular salad dressing, salted butter  Soups  Ok: Low-salt soups and broths made with allowed foods  Avoid: Bouillon cubes, soups with smoked or salted meats, regular soup and broth  Vegetables  Ok: Most vegetables are okay; also low-salt tomato and vegetable juices  Avoid: Sauerkraut and other brine-soaked vegetables; pickles and other pickled vegetables; tomato juice, olives  Date Last Reviewed: 8/1/2016  © 4492-7464 [employer]. [address] [place] [address]. All rights reserved. This information is not intended as a substitute for professional medical care. Always follow your healthcare professional's instructions.             Language Assistance Services     ATTENTION: Language assistance services are available, free of charge. Please call [phone].      ATENCIÓN: Si rohan choi, tiene a rascon disposición servicios gratuitos de asistencia lingüística. Llame al [phone].     CHÚ Ý: N?u b?n nói Ti?ng Vi?t, có các d?ch v? h? tr? ngôn ng? mi?n phí dành cho b?n. G?i s? [phone].         [facility] complies with applicable Federal civil rights laws and does not discriminate on the basis of race, color, national origin, age, disability, or sex.

## 2017-02-10 NOTE — PROGRESS NOTES
Subjective:       Patient ID: Ron Shipman is a 51 y.o. male.    Chief Complaint: Hypertension    HPI Comments: Mr. Shipman presents to the clinic today for follow up for hypertension.  Blood pressure is much improved since last visit but still uncontrolled.  Patient has cut back alcohol intake to 2-3 glasses of whiskey/day.  He is eating high salt diet.  Yesterday he ate a hot dog and a hamburger.  Today he ate a roast beef poboy.  He denies chest pain or shortness of breath.  He does get headaches.  He refuses flu shot.      Hypertension   This is a chronic problem. The current episode started more than 1 year ago. The problem is uncontrolled. Associated symptoms include headaches and peripheral edema (occasional). Pertinent negatives include no anxiety, blurred vision, chest pain, palpitations or shortness of breath. Associated agents: alcohol. Risk factors for coronary artery disease include male gender and obesity. Past treatments include beta blockers, diuretics and ACE inhibitors. The current treatment provides moderate improvement. Compliance problems include diet and exercise.  There is no history of kidney disease, CAD/MI or CVA.     Review of Systems   Constitutional: Negative for chills, fatigue and fever.   HENT: Negative for congestion, ear pain and sinus pressure.    Eyes: Negative for blurred vision and visual disturbance.   Respiratory: Negative for cough, shortness of breath and wheezing.    Cardiovascular: Negative for chest pain, palpitations and leg swelling.   Gastrointestinal: Negative for abdominal pain, constipation and diarrhea.   Neurological: Positive for headaches. Negative for dizziness and weakness.       Objective:      Physical Exam   Constitutional: He is oriented to person, place, and time. He appears well-developed and well-nourished. No distress.   HENT:   Head: Normocephalic and atraumatic.   Right Ear: External ear normal.   Left Ear: External ear normal.   Mouth/Throat:  Oropharynx is clear and moist. No oropharyngeal exudate.   Eyes: Pupils are equal, round, and reactive to light. Right eye exhibits no discharge. Left eye exhibits no discharge.   Neck: Neck supple. No thyromegaly present.   Cardiovascular: Normal rate and regular rhythm.  Exam reveals no gallop and no friction rub.    No murmur heard.  No lower extremity edema   Pulmonary/Chest: Effort normal and breath sounds normal. No respiratory distress. He has no wheezes. He has no rales.   Abdominal: Soft. He exhibits no distension. There is no tenderness.   Lymphadenopathy:     He has no cervical adenopathy.   Neurological: He is alert and oriented to person, place, and time. Coordination normal.   Skin: Skin is warm and dry.   Psychiatric: He has a normal mood and affect. His behavior is normal. Thought content normal.   Vitals reviewed.          Current Outpatient Prescriptions:     esomeprazole (NEXIUM) 40 MG capsule, Take 40 mg by mouth before breakfast., Disp: , Rfl:     lisinopril (PRINIVIL,ZESTRIL) 40 MG tablet, Take 1 tablet (40 mg total) by mouth once daily., Disp: 90 tablet, Rfl: 1    pravastatin (PRAVACHOL) 40 MG tablet, Take 40 mg by mouth once daily., Disp: , Rfl:     atenolol-chlorthalidone (TENORETIC) 100-25 mg per tablet, Take 1 tablet by mouth once daily., Disp: 30 tablet, Rfl: 11    Current Facility-Administered Medications:     cloNIDine tablet 0.2 mg, 0.2 mg, Oral, 1 time in Clinic/[facility], [place] [facility]  Assessment:       1. Essential hypertension    2. Alcohol abuse, daily use    3. Morbid obesity due to excess calories        Plan:     Essential hypertension  -     atenolol-chlorthalidone (TENORETIC) 100-25 mg per tablet; Take 1 tablet by mouth once daily.  Dispense: 30 tablet; Refill: 11    Alcohol abuse, daily use   Cutting back.     Morbid obesity due to excess calories  DASH diet.   Patient readiness: acceptance and barriers:readiness    During the course of the visit the patient was  educated and counseled about the following:     Hypertension:   Dietary sodium restriction.  Obesity:   General weight loss/lifestyle modification strategies discussed (elicit support from others; identify saboteurs; non-food rewards, etc).    Goals: Hypertension: Reduce Blood Pressure and Obesity: Reduce calorie intake and BMI    Did patient meet goals/outcomes: No    The following self management tools provided: declined    Patient Instructions (the written plan) was given to the patient/family.     Time spent with patient: 15 minutes

## 2017-04-21 DIAGNOSIS — I10 ESSENTIAL HYPERTENSION: ICD-10-CM

## 2017-04-21 RX ORDER — ATENOLOL AND CHLORTHALIDONE TABLET 100; 25 MG/1; MG/1
1 TABLET ORAL DAILY
Qty: 90 TABLET | Refills: 3 | Status: SHIPPED | OUTPATIENT
Start: 2017-04-21 | End: 2018-01-24 | Stop reason: SDUPTHER

## 2017-04-28 ENCOUNTER — DOCUMENTATION ONLY (OUTPATIENT)
Dept: FAMILY MEDICINE | Facility: CLINIC | Age: 52
End: 2017-04-28

## 2017-04-28 NOTE — PROGRESS NOTES
Pre-Visit Chart Review  For Appointment Scheduled on 5/2/17.    Health Maintenance Due   Topic Date Due    TETANUS VACCINE  08/07/1984    Influenza Vaccine  08/01/2016    Colonoscopy  08/07/2016

## 2017-05-02 ENCOUNTER — OFFICE VISIT (OUTPATIENT)
Dept: FAMILY MEDICINE | Facility: CLINIC | Age: 52
End: 2017-05-02
Payer: COMMERCIAL

## 2017-05-02 VITALS
DIASTOLIC BLOOD PRESSURE: 71 MMHG | WEIGHT: 315 LBS | HEIGHT: 71 IN | HEART RATE: 83 BPM | SYSTOLIC BLOOD PRESSURE: 127 MMHG | TEMPERATURE: 98 F | BODY MASS INDEX: 44.1 KG/M2

## 2017-05-02 DIAGNOSIS — Z82.49 FAMILY HISTORY OF PREMATURE CAD: ICD-10-CM

## 2017-05-02 DIAGNOSIS — R06.83 SNORING: ICD-10-CM

## 2017-05-02 DIAGNOSIS — R53.83 FATIGUE, UNSPECIFIED TYPE: ICD-10-CM

## 2017-05-02 DIAGNOSIS — E78.2 HYPERLIPIDEMIA, MIXED: ICD-10-CM

## 2017-05-02 DIAGNOSIS — E66.01 MORBID OBESITY DUE TO EXCESS CALORIES: ICD-10-CM

## 2017-05-02 DIAGNOSIS — R73.9 HYPERGLYCEMIA: ICD-10-CM

## 2017-05-02 DIAGNOSIS — I10 ESSENTIAL HYPERTENSION: Primary | ICD-10-CM

## 2017-05-02 DIAGNOSIS — H10.9 CONJUNCTIVITIS OF RIGHT EYE, UNSPECIFIED CONJUNCTIVITIS TYPE: ICD-10-CM

## 2017-05-02 PROCEDURE — 3078F DIAST BP <80 MM HG: CPT | Mod: S$GLB,,, | Performed by: FAMILY MEDICINE

## 2017-05-02 PROCEDURE — 3074F SYST BP LT 130 MM HG: CPT | Mod: S$GLB,,, | Performed by: FAMILY MEDICINE

## 2017-05-02 PROCEDURE — 99214 OFFICE O/P EST MOD 30 MIN: CPT | Mod: 25,S$GLB,, | Performed by: FAMILY MEDICINE

## 2017-05-02 PROCEDURE — 1160F RVW MEDS BY RX/DR IN RCRD: CPT | Mod: S$GLB,,, | Performed by: FAMILY MEDICINE

## 2017-05-02 PROCEDURE — 99999 PR PBB SHADOW E&M-EST. PATIENT-LVL III: CPT | Mod: PBBFAC,,, | Performed by: FAMILY MEDICINE

## 2017-05-02 RX ORDER — ZOLPIDEM TARTRATE 10 MG/1
10 TABLET ORAL NIGHTLY PRN
Qty: 30 TABLET | Refills: 1 | Status: SHIPPED | OUTPATIENT
Start: 2017-05-02 | End: 2019-07-16

## 2017-05-02 RX ORDER — POLYMYXIN B SULFATE AND TRIMETHOPRIM 1; 10000 MG/ML; [USP'U]/ML
1 SOLUTION OPHTHALMIC 4 TIMES DAILY
Qty: 10 ML | Refills: 0 | Status: SHIPPED | OUTPATIENT
Start: 2017-05-02 | End: 2017-05-09

## 2017-05-02 NOTE — PROGRESS NOTES
CHIEF COMPLAINT:  Establish care      HISTORY OF PRESENT ILLNESS:  Be Shipman is a 51 y.o. male who presents to clinic as a new patient to me to establish care. He has HTN and is on tenoretic and lisinopril. He denies any cough, CP, edema. He has hyperlipidemia and is on pravastatin. He denies any myalgia, dark colored urine. He has hyperglycemia and is due for repeat FBG, HgA1c.  He states that on Monday he developed diffuse redness over his right eye with yellow crusting and watering. He has been using visine with minimal improvement. He denies any vision changes, eye pain. He is also concerned about excessive daytime fatigue. He states that he will fall asleep at work and has to take naps. He states that he snores. He does have difficulty staying asleep. He has never had a sleep study. He has never been on a sleep aid.       REVIEW OF SYSTEMS:   The patient denies any fever, chills, night sweats, headaches, vision changes, difficulty speaking or swallowing, decreased hearing, weight loss, weight gain, chest pain, palpitations, shortness of breath, cough, nausea, vomiting, abdominal pain, dysuria, diarrhea, constipation, hematuria, hematochezia, melena, changes in his hair, skin, nails, numbness or weakness in his extremities, erythema, swelling or pain over any of his joints, myalgia, swollen glands, easy bruising,edema. He denies any scrotal/testicular masses, penile discharge. He denies any symptoms of anxiety or depression.    MEDICATIONS:   Reviewed and/or reconciled in EPIC    ALLERGIES:  Reviewed and/or reconciled in EPIC    PAST MEDICAL/SURGICAL HISTORY:   Past Medical History:   Diagnosis Date    Hyperlipidemia     Hypertension       Past Surgical History:   Procedure Laterality Date    HAND SURGERY Left     20 y/o    HERNIA REPAIR         FAMILY HISTORY:    Family History   Problem Relation Age of Onset    Heart disease Mother     Hypertension Mother     Cancer Sister      breast    Heart  "disease Sister     Heart disease Brother     Stroke Brother     Heart disease Brother     Heart disease Brother        SOCIAL HISTORY:    Social History     Social History    Marital status:      Spouse name: N/A    Number of children: N/A    Years of education: N/A     Occupational History    Not on file.     Social History Main Topics    Smoking status: Former Smoker    Smokeless tobacco: Former User     Quit date: 7/22/2000    Alcohol use Yes      Comment: every day 1/2 a fifth of whiskey per day    Drug use: No    Sexual activity: Yes     Other Topics Concern    Not on file     Social History Narrative       PHYSICAL EXAM:  VITAL SIGNS:   Vitals:    05/02/17 1536   BP: 127/71   BP Location: Left arm   Patient Position: Sitting   BP Method: Automatic   Pulse: 83   Temp: 98.2 °F (36.8 °C)   TempSrc: Oral   Weight: (!) 145.7 kg (321 lb 3.4 oz)   Height: 5' 11" (1.803 m)     GENERAL:  Patient appears well nourished, sitting on exam table, in no acute distress.  HEENT:  Atraumatic, normocephalic, PERRLA, EOMI, diffuse right conjunctival injection,  sclerae are anicteric, there is yellow crusting over upper and lower eyelashes on the right. normal external auditory canals,TMs clear b/l, gross hearing intact to whisper, MMM, no oropharygneal erythema or exudate.  NECK:  Supple, normal ROM, trachea is midline , no supraclavicular or cervical LAD or masses palpated.  Thyroid gland not palpable.  CARDIOVASCULAR:  RRR, normal S1 and S2, no m/r/g.  RESPIRATORY:  CTA b/l, no wheezes, rhonchi, rales.  No increased work of breathing, no  use of accessory muscles.  ABDOMEN:  Soft, nontender, nondistended, normoactive bowel sounds in all four quadrants, no rebound or guarding, no HSM or masses palpated.  Normal percussion.  EXTREMITIES:  2+ DP pulses b/l, no edema.  SKIN:  Warm, no lesions on exposed skin.  NEUROMUSCULAR:  Cranial nerves II-XII grossly intact.  . No clubbing or cyanosis of digits/nails.  " Steady gait.  PSYCH:  Patient is alert and oriented to person, time, place. They are appropriately dressed and groomed. There is normal eye contact. Rate and tone of speech is normal. Normal insight, judgement. Normal thought content and process.     LABORATORY/IMAGING STUDIES: pending    ASSESSMENT/PLAN: This is a 51 y.o. male who presents to clinic for evaluation of the following concerns  1. HTN: see below  2. Obesity: see below  3. Hyperlipidemia: CMP, lipid panel  4. Hyperglycemia: FBG, HgA1c  5. Fatigue, snoring: CBC, CMP, TSH, testosterone level, sleep study. May use prn ambien, the risks/side effects of this medication were discussed with him.  6. Conjunctivitis: place on polytrim eye drops  7. Family history of PCAD: will need to address stress test at follow up visit.     Patient readiness: acceptance and barriers:none    During the course of the visit the patient was educated and counseled about the following:     Hypertension:   Medication: no change.  Obesity:   General weight loss/lifestyle modification strategies discussed (elicit support from others; identify saboteurs; non-food rewards, etc).  Diet interventions: moderate (500 kCal/d) deficit diet.  Informal exercise measures discussed, e.g. taking stairs instead of elevator.  Regular aerobic exercise program discussed.    Goals: Hypertension: Reduce Blood Pressure and Obesity: Reduce calorie intake and BMI    Did patient meet goals/outcomes: No    The following self management tools provided: declined    Patient Instructions (the written plan) was given to the patient/family.     Time spent with patient: 30 minutes        Liss Raza MD

## 2017-05-02 NOTE — MR AVS SNAPSHOT
[facility]  [address] [address]  Phone: [phone]  Fax: [phone]                  Be Shipman   2017 3:40 PM   Office Visit    Description:  Male : 1965   Provider:  Liss Raza MD   Department:  [facility]           Reason for Visit     Establish Care           Diagnoses this Visit        Comments    Essential hypertension    -  Primary     Morbid obesity due to excess calories         Hyperlipidemia, mixed         Hyperglycemia         Family history of premature CAD         Fatigue, unspecified type         Snoring                To Do List           Future Appointments        Provider Department Dept Phone    2017 8:00 AM [facility] [phone]    11/3/2017 3:00 PM Liss Raza MD [facility] [phone]      Goals (5 Years of Data)     None      Follow-Up and Disposition     Return in about 6 months (around 2017) for HTN.       These Medications        Disp Refills Start End    zolpidem (AMBIEN) 10 mg Tab 30 tablet 1 2017 10/31/2017    Take 1 tablet (10 mg total) by mouth nightly as needed. - Oral    Pharmacy: [facility]/pharmacy #[id number] - [place] - [address] Ph #: [phone]       polymyxin B sulf-trimethoprim (POLYTRIM) 10,000 unit- 1 mg/mL Drop 10 mL 0 2017    Place 1 drop into the right eye 4 (four) times daily. - Right Eye    Pharmacy: [facility]/pharmacy #[id number] - [place] - [address] Ph #: [phone]         [facility]     [facility] On [facility] Nurse Care Line - 24/ Assistance  Unless otherwise directed by your provider, please contact [facility], our nurse care line that is available for / assistance.     Registered nurses in the [facility] provide: appointment scheduling, clinical advisement, health education, and other advisory services.  Call: [phone] (toll free)               Medications           Message  "regarding Medications     Verify the changes and/or additions to your medication regime listed below are the same as discussed with your clinician today.  If any of these changes or additions are incorrect, please notify your healthcare provider.        START taking these NEW medications        Refills    zolpidem (AMBIEN) 10 mg Tab 1    Sig: Take 1 tablet (10 mg total) by mouth nightly as needed.    Class: Normal    Route: Oral    polymyxin B sulf-trimethoprim (POLYTRIM) 10,000 unit- 1 mg/mL Drop 0    Sig: Place 1 drop into the right eye 4 (four) times daily.    Class: Normal    Route: Right Eye           Verify that the below list of medications is an accurate representation of the medications you are currently taking.  If none reported, the list may be blank. If incorrect, please contact your healthcare provider. Carry this list with you in case of emergency.           Current Medications     atenolol-chlorthalidone (TENORETIC) 100-25 mg per tablet Take 1 tablet by mouth once daily.    esomeprazole (NEXIUM) 40 MG capsule Take 40 mg by mouth before breakfast.    lisinopril (PRINIVIL,ZESTRIL) 40 MG tablet Take 1 tablet (40 mg total) by mouth once daily.    pravastatin (PRAVACHOL) 40 MG tablet Take 40 mg by mouth once daily.    polymyxin B sulf-trimethoprim (POLYTRIM) 10,000 unit- 1 mg/mL Drop Place 1 drop into the right eye 4 (four) times daily.    zolpidem (AMBIEN) 10 mg Tab Take 1 tablet (10 mg total) by mouth nightly as needed.           Clinical Reference Information           Your Vitals Were     BP Pulse Temp    127/71 (BP Location: Left arm, Patient Position: Sitting, BP Method: Automatic) 83 98.2 °F (36.8 °C) (Oral)    Height Weight BMI    5' 11" (1.803 m) 145.7 kg (321 lb 3.4 oz) 44.8 kg/m2      Blood Pressure          Most Recent Value    BP  127/71      Allergies as of 5/2/2017     Tetanus Vaccines And Toxoid      Immunizations Administered on Date of Encounter - 5/2/2017     None      Orders Placed " During Today's Visit     Future Labs/Procedures Expected by Expires    CBC auto differential  5/2/2017 7/1/2018    Comprehensive metabolic panel  5/2/2017 7/1/2018    Ferritin  5/2/2017 7/1/2018    Folate  5/2/2017 7/1/2018    Hemoglobin A1c  5/2/2017 7/1/2018    Iron and TIBC  5/2/2017 7/1/2018    Lipid panel  5/2/2017 7/1/2018    T4, free  5/2/2017 7/1/2018    Testosterone Panel  5/2/2017 7/1/2018    TSH  5/2/2017 7/1/2018    Vitamin B12  5/2/2017 7/1/2018    Vitamin D  5/2/2017 7/1/2018    Polysomnogram (CPAP will be added if patient meets diagnostic criteria.)  As directed 5/2/2018      Language Assistance Services     ATTENTION: Language assistance services are available, free of charge. Please call [phone].      ATENCIÓN: Si habla español, tiene a rascon disposición servicios gratuitos de asistencia lingüística. Llame al [phone].     CHÚ Ý: N?u b?n nói Ti?ng Vi?t, có các d?ch v? h? tr? ngôn ng? mi?n phí dành cho b?n. G?i s? [phone].         [facility] complies with applicable Federal civil rights laws and does not discriminate on the basis of race, color, national origin, age, disability, or sex.

## 2017-05-06 ENCOUNTER — LAB VISIT (OUTPATIENT)
Dept: LAB | Facility: HOSPITAL | Age: 52
End: 2017-05-06
Attending: FAMILY MEDICINE
Payer: COMMERCIAL

## 2017-05-06 DIAGNOSIS — E78.2 HYPERLIPIDEMIA, MIXED: ICD-10-CM

## 2017-05-06 DIAGNOSIS — R53.83 FATIGUE, UNSPECIFIED TYPE: ICD-10-CM

## 2017-05-06 DIAGNOSIS — R73.9 HYPERGLYCEMIA: ICD-10-CM

## 2017-05-06 LAB
25(OH)D3+25(OH)D2 SERPL-MCNC: 28 NG/ML
ALBUMIN SERPL BCP-MCNC: 3.5 G/DL
ALP SERPL-CCNC: 79 U/L
ALT SERPL W/O P-5'-P-CCNC: 36 U/L
ANION GAP SERPL CALC-SCNC: 11 MMOL/L
AST SERPL-CCNC: 27 U/L
BASOPHILS # BLD AUTO: 0.04 K/UL
BASOPHILS NFR BLD: 0.5 %
BILIRUB SERPL-MCNC: 0.4 MG/DL
BUN SERPL-MCNC: 17 MG/DL
CALCIUM SERPL-MCNC: 9.6 MG/DL
CHLORIDE SERPL-SCNC: 104 MMOL/L
CHOLEST/HDLC SERPL: 8.1 {RATIO}
CO2 SERPL-SCNC: 28 MMOL/L
CREAT SERPL-MCNC: 1 MG/DL
DIFFERENTIAL METHOD: ABNORMAL
EOSINOPHIL # BLD AUTO: 0.2 K/UL
EOSINOPHIL NFR BLD: 2.2 %
ERYTHROCYTE [DISTWIDTH] IN BLOOD BY AUTOMATED COUNT: 13.9 %
EST. GFR  (AFRICAN AMERICAN): >60 ML/MIN/1.73 M^2
EST. GFR  (NON AFRICAN AMERICAN): >60 ML/MIN/1.73 M^2
FERRITIN SERPL-MCNC: 20 NG/ML
FOLATE SERPL-MCNC: 9.4 NG/ML
GLUCOSE SERPL-MCNC: 103 MG/DL
HCT VFR BLD AUTO: 42.8 %
HDL/CHOLESTEROL RATIO: 12.3 %
HDLC SERPL-MCNC: 260 MG/DL
HDLC SERPL-MCNC: 32 MG/DL
HGB BLD-MCNC: 13.7 G/DL
IRON SERPL-MCNC: 41 UG/DL
LDLC SERPL CALC-MCNC: 153.2 MG/DL
LYMPHOCYTES # BLD AUTO: 2.6 K/UL
LYMPHOCYTES NFR BLD: 34.6 %
MCH RBC QN AUTO: 30.3 PG
MCHC RBC AUTO-ENTMCNC: 32 %
MCV RBC AUTO: 95 FL
MONOCYTES # BLD AUTO: 0.7 K/UL
MONOCYTES NFR BLD: 9.6 %
NEUTROPHILS # BLD AUTO: 3.9 K/UL
NEUTROPHILS NFR BLD: 52.7 %
NONHDLC SERPL-MCNC: 228 MG/DL
PLATELET # BLD AUTO: 227 K/UL
PMV BLD AUTO: 10.9 FL
POTASSIUM SERPL-SCNC: 4.1 MMOL/L
PROT SERPL-MCNC: 7 G/DL
RBC # BLD AUTO: 4.52 M/UL
SATURATED IRON: 7 %
SODIUM SERPL-SCNC: 143 MMOL/L
T4 FREE SERPL-MCNC: 0.82 NG/DL
TOTAL IRON BINDING CAPACITY: 549 UG/DL
TRANSFERRIN SERPL-MCNC: 371 MG/DL
TRIGL SERPL-MCNC: 374 MG/DL
TSH SERPL DL<=0.005 MIU/L-ACNC: 1.64 UIU/ML
VIT B12 SERPL-MCNC: 360 PG/ML
WBC # BLD AUTO: 7.42 K/UL

## 2017-05-06 PROCEDURE — 80061 LIPID PANEL: CPT

## 2017-05-06 PROCEDURE — 82306 VITAMIN D 25 HYDROXY: CPT

## 2017-05-06 PROCEDURE — 84443 ASSAY THYROID STIM HORMONE: CPT

## 2017-05-06 PROCEDURE — 82728 ASSAY OF FERRITIN: CPT

## 2017-05-06 PROCEDURE — 83540 ASSAY OF IRON: CPT

## 2017-05-06 PROCEDURE — 85025 COMPLETE CBC W/AUTO DIFF WBC: CPT

## 2017-05-06 PROCEDURE — 84270 ASSAY OF SEX HORMONE GLOBUL: CPT

## 2017-05-06 PROCEDURE — 82746 ASSAY OF FOLIC ACID SERUM: CPT

## 2017-05-06 PROCEDURE — 80053 COMPREHEN METABOLIC PANEL: CPT

## 2017-05-06 PROCEDURE — 83036 HEMOGLOBIN GLYCOSYLATED A1C: CPT

## 2017-05-06 PROCEDURE — 84439 ASSAY OF FREE THYROXINE: CPT

## 2017-05-06 PROCEDURE — 36415 COLL VENOUS BLD VENIPUNCTURE: CPT | Mod: PO

## 2017-05-06 PROCEDURE — 82607 VITAMIN B-12: CPT

## 2017-05-08 LAB
ESTIMATED AVG GLUCOSE: 134 MG/DL
HBA1C MFR BLD HPLC: 6.3 %

## 2017-05-11 LAB
ALBUMIN SERPL-MCNC: 4 G/DL (ref 3.6–5.1)
SHBG SERPL-SCNC: 19 NMOL/L (ref 10–50)
TESTOST FREE SERPL-MCNC: 34 PG/ML (ref 46–224)
TESTOST SERPL-MCNC: 178 NG/DL (ref 250–1100)
TESTOSTERONE.FREE+WB SERPL-MCNC: 62.6 NG/DL (ref 110–575)

## 2017-06-06 ENCOUNTER — TELEPHONE (OUTPATIENT)
Dept: FAMILY MEDICINE | Facility: CLINIC | Age: 52
End: 2017-06-06

## 2017-06-06 NOTE — TELEPHONE ENCOUNTER
Returned patient's call and offered appt today with Tena. Patient had went to a walk in clinic already.

## 2017-06-06 NOTE — TELEPHONE ENCOUNTER
----- Message from Claudia Leiva sent at 6/6/2017  9:07 AM CDT -----  Patient is calling to get a same day appointment because his tonsils are swollen, hard to swallow. He is not feeling good and does not feel like he can wait until tomorrow. Please call with availability at [phone].

## 2017-06-18 ENCOUNTER — TELEPHONE (OUTPATIENT)
Dept: FAMILY MEDICINE | Facility: CLINIC | Age: 52
End: 2017-06-18

## 2017-06-18 DIAGNOSIS — E55.9 VITAMIN D DEFICIENCY: Primary | ICD-10-CM

## 2017-06-18 DIAGNOSIS — R79.89 LOW TESTOSTERONE: ICD-10-CM

## 2017-06-18 DIAGNOSIS — D50.9 IRON DEFICIENCY ANEMIA, UNSPECIFIED IRON DEFICIENCY ANEMIA TYPE: ICD-10-CM

## 2017-06-18 RX ORDER — ASPIRIN 325 MG
50000 TABLET, DELAYED RELEASE (ENTERIC COATED) ORAL WEEKLY
Qty: 8 CAPSULE | Refills: 0 | Status: SHIPPED | OUTPATIENT
Start: 2017-06-18 | End: 2017-08-07

## 2017-06-18 NOTE — TELEPHONE ENCOUNTER
The vitamin D level is low. I am starting high dose vitamin D supplementation for 8 weeks to be followed with a repeat vitamin D level.    The testosterone level was low. Needs additional testosterone level checked at 8 am fasting.    There is mild anemia and iron levels are low. Needs repeat anemia labs, UA, hemoccult to look for blood loss.     His blood sugar and cholesterol are still high. Needs to eat low carb, low fat diet, work on weight loss and we will rechekc this in 6 months.       All of this can be causing fatigue.

## 2017-06-19 NOTE — TELEPHONE ENCOUNTER
Patient notified and states understanding,lab appointment scheduled.notified patient to stop by office to  hemoccult cards

## 2017-06-24 ENCOUNTER — LAB VISIT (OUTPATIENT)
Dept: LAB | Facility: HOSPITAL | Age: 52
End: 2017-06-24
Attending: FAMILY MEDICINE
Payer: COMMERCIAL

## 2017-06-24 DIAGNOSIS — R79.89 LOW TESTOSTERONE: ICD-10-CM

## 2017-06-24 DIAGNOSIS — D50.9 IRON DEFICIENCY ANEMIA, UNSPECIFIED IRON DEFICIENCY ANEMIA TYPE: ICD-10-CM

## 2017-06-24 LAB
BASOPHILS # BLD AUTO: 0.03 K/UL
BASOPHILS NFR BLD: 0.6 %
DIFFERENTIAL METHOD: ABNORMAL
EOSINOPHIL # BLD AUTO: 0.1 K/UL
EOSINOPHIL NFR BLD: 2 %
ERYTHROCYTE [DISTWIDTH] IN BLOOD BY AUTOMATED COUNT: 13.3 %
FERRITIN SERPL-MCNC: 17 NG/ML
HCT VFR BLD AUTO: 39.6 %
HGB BLD-MCNC: 12.9 G/DL
IRON SERPL-MCNC: 40 UG/DL
LYMPHOCYTES # BLD AUTO: 2.2 K/UL
LYMPHOCYTES NFR BLD: 42.9 %
MCH RBC QN AUTO: 29.9 PG
MCHC RBC AUTO-ENTMCNC: 32.6 %
MCV RBC AUTO: 92 FL
MONOCYTES # BLD AUTO: 0.6 K/UL
MONOCYTES NFR BLD: 12.5 %
NEUTROPHILS # BLD AUTO: 2.1 K/UL
NEUTROPHILS NFR BLD: 41.8 %
PLATELET # BLD AUTO: 237 K/UL
PMV BLD AUTO: 10.7 FL
RBC # BLD AUTO: 4.31 M/UL
SATURATED IRON: 7 %
TOTAL IRON BINDING CAPACITY: 556 UG/DL
TRANSFERRIN SERPL-MCNC: 376 MG/DL
WBC # BLD AUTO: 5.06 K/UL

## 2017-06-24 PROCEDURE — 85025 COMPLETE CBC W/AUTO DIFF WBC: CPT

## 2017-06-24 PROCEDURE — 83540 ASSAY OF IRON: CPT

## 2017-06-24 PROCEDURE — 82728 ASSAY OF FERRITIN: CPT

## 2017-06-24 PROCEDURE — 84466 ASSAY OF TRANSFERRIN: CPT

## 2017-06-24 PROCEDURE — 84270 ASSAY OF SEX HORMONE GLOBUL: CPT

## 2017-06-24 PROCEDURE — 36415 COLL VENOUS BLD VENIPUNCTURE: CPT | Mod: PO

## 2017-06-30 LAB
ALBUMIN SERPL-MCNC: 4.2 G/DL (ref 3.6–5.1)
SHBG SERPL-SCNC: 21 NMOL/L (ref 10–50)
TESTOST FREE SERPL-MCNC: 30.2 PG/ML (ref 46–224)
TESTOST SERPL-MCNC: 173 NG/DL (ref 250–1100)
TESTOSTERONE.FREE+WB SERPL-MCNC: 58.1 NG/DL (ref 110–575)

## 2017-07-20 DIAGNOSIS — I10 ESSENTIAL HYPERTENSION: ICD-10-CM

## 2017-07-20 RX ORDER — LISINOPRIL 40 MG/1
40 TABLET ORAL DAILY
Qty: 90 TABLET | Refills: 3 | Status: SHIPPED | OUTPATIENT
Start: 2017-07-20 | End: 2018-07-24 | Stop reason: SDUPTHER

## 2017-08-02 ENCOUNTER — OFFICE VISIT (OUTPATIENT)
Dept: FAMILY MEDICINE | Facility: CLINIC | Age: 52
End: 2017-08-02
Payer: COMMERCIAL

## 2017-08-02 VITALS
HEART RATE: 116 BPM | DIASTOLIC BLOOD PRESSURE: 82 MMHG | TEMPERATURE: 98 F | BODY MASS INDEX: 42.22 KG/M2 | WEIGHT: 301.56 LBS | HEIGHT: 71 IN | SYSTOLIC BLOOD PRESSURE: 130 MMHG

## 2017-08-02 DIAGNOSIS — R42 VERTIGO: ICD-10-CM

## 2017-08-02 DIAGNOSIS — J32.4 PANSINUSITIS, UNSPECIFIED CHRONICITY: Primary | ICD-10-CM

## 2017-08-02 PROCEDURE — 99999 PR PBB SHADOW E&M-EST. PATIENT-LVL III: CPT | Mod: PBBFAC,,, | Performed by: FAMILY MEDICINE

## 2017-08-02 PROCEDURE — 96372 THER/PROPH/DIAG INJ SC/IM: CPT | Mod: S$GLB,,, | Performed by: FAMILY MEDICINE

## 2017-08-02 PROCEDURE — 99214 OFFICE O/P EST MOD 30 MIN: CPT | Mod: 25,S$GLB,, | Performed by: FAMILY MEDICINE

## 2017-08-02 RX ORDER — BETAMETHASONE SODIUM PHOSPHATE AND BETAMETHASONE ACETATE 3; 3 MG/ML; MG/ML
12 INJECTION, SUSPENSION INTRA-ARTICULAR; INTRALESIONAL; INTRAMUSCULAR; SOFT TISSUE
Status: COMPLETED | OUTPATIENT
Start: 2017-08-02 | End: 2017-08-02

## 2017-08-02 RX ORDER — MECLIZINE HYDROCHLORIDE 25 MG/1
25 TABLET ORAL
Qty: 40 TABLET | Refills: 0 | Status: SHIPPED | OUTPATIENT
Start: 2017-08-02 | End: 2019-02-15

## 2017-08-02 RX ORDER — DOXYCYCLINE 100 MG/1
100 CAPSULE ORAL 2 TIMES DAILY
Qty: 20 CAPSULE | Refills: 0 | Status: SHIPPED | OUTPATIENT
Start: 2017-08-02 | End: 2019-02-15

## 2017-08-02 RX ADMIN — BETAMETHASONE SODIUM PHOSPHATE AND BETAMETHASONE ACETATE 12 MG: 3; 3 INJECTION, SUSPENSION INTRA-ARTICULAR; INTRALESIONAL; INTRAMUSCULAR; SOFT TISSUE at 03:08

## 2017-08-02 NOTE — PATIENT INSTRUCTIONS
Weight Management: Getting Started  Healthy bodies come in all shapes and sizes. Not all bodies are made to be thin. For some people, a healthy weight is higher than the average weight listed on weight charts. Your healthcare provider can help you decide on a healthy weight for you.    Reasons to lose weight  Losing weight can help with some health problems, such as high blood pressure, heart disease, diabetes, sleep apnea, and arthritis. You may also feel more energy.  Set your long-term goal  Your goal doesn't even have to be a specific weight. You may decide on a fitness goal (such as being able to walk 10 miles a week), or a health goal (such as lowering your blood pressure). Choose a goal that is measurable and reasonable, so you know when you've reached it. A goal of reaching a BMI of less than 25 is not always reasonable (or possible).   Make an action plan  Habits dont change overnight. Setting your goals too high can leave you feeling discouraged if you cant reach them. Be realistic. Choose one or two small changes you can make now. Set an action plan for how you are going to make these changes. When you can stick to this plan, keep making a few more small changes. Taking small steps will help you stay on the path to success.  Track your progress  Write down your goals. Then, keep a daily record of your progress. Write down what you eat and how active you are. This record lets you look back on how much youve done. It may also help when youre feeling frustrated. Reward yourself for success. Even if you dont reach every goal, give yourself credit for what you do get done.  Get support  Encouragement from others can help make losing weight easier. Ask your family members and friends for support. They may even want to join you. Also look to your healthcare provider, registered dietitian, and  for help. Your local hospital can give you more information about nutrition, exercise, and  weight loss.  Date Last Reviewed: 1/31/2016 © 2000-2016 [employer]. [address] [place] [address], [place] [address]. All rights reserved. This information is not intended as a substitute for professional medical care. Always follow your healthcare professional's instructions.        Walking for Fitness  Fitness walking has something for everyone, even people who are already fit. Walking is one of the safest ways to condition your body aerobically. It can boost energy, help you lose weight, and reduce stress.    Physical benefits  · Walking strengthens your heart and lungs, and tones your muscles.  · When walking, your feet land with less impact than in other sports. This reduces chances of muscle, bone, and joint injury.  · Regular walking improves your cholesterol levels and lowers your risk of heart disease. And it helps you control your blood sugar if you have diabetes.  · Walking is a weight-bearing activity, which helps maintain bone density. This can help prevent osteoporosis.  Personal rewards  · Taking walks can help you relax and manage stress. And fitness walking may make you feel better about yourself.  · Walking can help you sleep better at night and make you less likely to be depressed.  · Regular walking may help maintain your memory as you get older.  · Walking is a great way to spend extra time with friends and family members. Be sure to invite your dog along!  Q&A about fitness walking  Q: Will walking keep me fit?  A: Yes. Regular walking at the right pace gives you all the benefits of other aerobic activities, such as jogging and swimming.  Q: Will walking help me lose weight and keep it off?  A: Yes. Per mile, walking can burn as many calories as jogging. Your health care provider can help work walking into your weight-loss plan.  Q: Is walking safe for my health?  A: Yes. Walking is safe if you have high blood pressure, diabetes, heart disease, or other conditions. Talk to your health  care provider before you start.  Date Last Reviewed: 5/9/2015  © 7468-3936 [employer]. [address] [place] [address], [place] [address]. All rights reserved. This information is not intended as a substitute for professional medical care. Always follow your healthcare professional's instructions.

## 2017-08-02 NOTE — PROGRESS NOTES
Subjective:       Patient ID: Be Shipman is a 51 y.o. male.    Chief Complaint: Dizziness and Nausea    Dizziness:   Chronicity:  New  Onset:  1 to 4 weeks ago  Progression since onset:  Waxing and waning  Duration:  Very brief  Dizziness characteristics:  Sensation of movement and off-balance   Associated symptoms: ear congestion, ear pain and nausea.no fever, no syncope and no chest pain.  Nausea   Associated symptoms include congestion, nausea and a sore throat. Pertinent negatives include no abdominal pain, chest pain, fever, numbness or rash.   Sinusitis   This is a new problem. The current episode started 1 to 4 weeks ago. The problem has been waxing and waning since onset. There has been no fever. Associated symptoms include congestion, ear pain, sinus pressure and a sore throat. Pertinent negatives include no shortness of breath.     Review of Systems   Constitutional: Negative for fever.   HENT: Positive for congestion, ear pain, sinus pressure and sore throat.    Respiratory: Negative for shortness of breath.    Cardiovascular: Negative for chest pain and syncope.   Gastrointestinal: Positive for nausea. Negative for abdominal pain.   Skin: Negative for rash.   Neurological: Positive for dizziness. Negative for numbness.   All other systems reviewed and are negative.      Objective:      Physical Exam   Constitutional: He appears well-developed. No distress.   HENT:   Right Ear: Tympanic membrane is bulging. A middle ear effusion is present.   Left Ear: Tympanic membrane is bulging. A middle ear effusion is present.   Nose: Mucosal edema present. Right sinus exhibits maxillary sinus tenderness and frontal sinus tenderness. Left sinus exhibits maxillary sinus tenderness and frontal sinus tenderness.   Mouth/Throat: Posterior oropharyngeal erythema present.   Neck: Neck supple.   Cardiovascular: Normal rate and regular rhythm.    No murmur heard.  Pulmonary/Chest: Effort normal and breath sounds  normal.   Lymphadenopathy:     He has no cervical adenopathy.   Neurological: He has normal strength. No cranial nerve deficit. Gait normal.   Skin: Skin is warm and dry.       Assessment:       1. Pansinusitis, unspecified chronicity    2. Vertigo        Plan:         Be was seen today for dizziness and nausea.    Diagnoses and all orders for this visit:    Pansinusitis, unspecified chronicity    Vertigo    Other orders  -     betamethasone acetate-betamethasone sodium phosphate injection 12 mg; Inject 2 mLs (12 mg total) into the muscle one time.  -     doxycycline (MONODOX) 100 MG capsule; Take 1 capsule (100 mg total) by mouth 2 (two) times daily.  -     meclizine (ANTIVERT) 25 mg tablet; Take 1 tablet (25 mg total) by mouth taper from 4 doses each day to 1 dose and stop. Start taper after dizziness clears

## 2017-08-02 NOTE — PROGRESS NOTES
2 patient identifiers used ( name &  ).  Administered 12 mg Celestone RUOQG IM.  Patient tolerated well.  No bleeding at insertion site noted.  Pain scale 0/10.  Allergies reviewed.    Aseptic technique maintained

## 2017-08-05 ENCOUNTER — TELEPHONE (OUTPATIENT)
Dept: FAMILY MEDICINE | Facility: CLINIC | Age: 52
End: 2017-08-05

## 2017-08-05 DIAGNOSIS — D50.9 IRON DEFICIENCY ANEMIA, UNSPECIFIED IRON DEFICIENCY ANEMIA TYPE: Primary | ICD-10-CM

## 2017-08-05 DIAGNOSIS — R79.89 LOW TESTOSTERONE: ICD-10-CM

## 2017-08-05 NOTE — TELEPHONE ENCOUNTER
There is iron deficiency anemia. He does not appear to have dropped off hemoccults. He needs to see GI for evaluation of possible GI blood loss which can cause the anemia. Needs to take ferrous sulfate 325 mg TID with vitamin C and call us to schedule repeat labs in 3 months. This can be causing his fatigue.      Testosterone is still low. Needs repeat testosterone, 8 am fasting cortisol level, LH, FSH, T4 and prolactin levels. If no significant abnormalities can consider testosterone supplementation

## 2017-08-08 ENCOUNTER — TELEPHONE (OUTPATIENT)
Dept: FAMILY MEDICINE | Facility: CLINIC | Age: 52
End: 2017-08-08

## 2017-08-08 NOTE — TELEPHONE ENCOUNTER
----- Message from Bri Zayas sent at 8/8/2017  3:35 PM CDT -----  Contact: self  Returning your call.  Please call back at [phone] (home)

## 2017-08-08 NOTE — TELEPHONE ENCOUNTER
Patient notified of results and instructed to start iron supplement (ferrous sulfate 325mg) and vitamin C OTC. Patient states he didn't do the hemoccult cards and advised to get supplies from clinic and for further instructions on collection. Also attempted to schedule appt with GI and labs. Patient refused to schedule any appointments at this time.

## 2017-08-14 ENCOUNTER — DOCUMENTATION ONLY (OUTPATIENT)
Dept: FAMILY MEDICINE | Facility: CLINIC | Age: 52
End: 2017-08-14

## 2017-08-14 NOTE — PROGRESS NOTES
Pre-Visit Chart Review  For Appointment Scheduled on 08/15/2017    Health Maintenance Due   Topic Date Due    Colonoscopy  08/07/2016    Influenza Vaccine  08/01/2017

## 2017-08-24 DIAGNOSIS — Z12.11 COLON CANCER SCREENING: ICD-10-CM

## 2017-10-31 ENCOUNTER — DOCUMENTATION ONLY (OUTPATIENT)
Dept: FAMILY MEDICINE | Facility: CLINIC | Age: 52
End: 2017-10-31

## 2017-10-31 NOTE — PROGRESS NOTES
Pre-Visit Chart Review  For Appointment Scheduled on 11/3/17.    Health Maintenance Due   Topic Date Due    Colonoscopy  08/07/2016    Influenza Vaccine  08/01/2017

## 2018-01-24 DIAGNOSIS — I10 ESSENTIAL HYPERTENSION: ICD-10-CM

## 2018-01-24 RX ORDER — ATENOLOL AND CHLORTHALIDONE TABLET 100; 25 MG/1; MG/1
1 TABLET ORAL DAILY
Qty: 90 TABLET | Refills: 1 | Status: SHIPPED | OUTPATIENT
Start: 2018-01-24 | End: 2018-07-24 | Stop reason: SDUPTHER

## 2018-07-24 DIAGNOSIS — I10 ESSENTIAL HYPERTENSION: ICD-10-CM

## 2018-07-24 RX ORDER — LISINOPRIL 40 MG/1
TABLET ORAL
Qty: 90 TABLET | Refills: 3 | Status: SHIPPED | OUTPATIENT
Start: 2018-07-24 | End: 2019-07-16

## 2018-07-25 RX ORDER — ATENOLOL AND CHLORTHALIDONE TABLET 100; 25 MG/1; MG/1
1 TABLET ORAL DAILY
Qty: 90 TABLET | Refills: 1 | Status: SHIPPED | OUTPATIENT
Start: 2018-07-25 | End: 2019-07-16

## 2019-01-21 DIAGNOSIS — I10 ESSENTIAL HYPERTENSION: ICD-10-CM

## 2019-01-21 RX ORDER — ATENOLOL AND CHLORTHALIDONE TABLET 100; 25 MG/1; MG/1
TABLET ORAL
Qty: 90 TABLET | Refills: 1 | OUTPATIENT
Start: 2019-01-21

## 2019-02-15 ENCOUNTER — OFFICE VISIT (OUTPATIENT)
Dept: PODIATRY | Facility: CLINIC | Age: 54
End: 2019-02-15
Payer: COMMERCIAL

## 2019-02-15 VITALS
HEART RATE: 92 BPM | HEIGHT: 71 IN | DIASTOLIC BLOOD PRESSURE: 133 MMHG | BODY MASS INDEX: 44.1 KG/M2 | SYSTOLIC BLOOD PRESSURE: 212 MMHG | WEIGHT: 315 LBS | TEMPERATURE: 99 F

## 2019-02-15 DIAGNOSIS — S93.402A MODERATE LEFT ANKLE SPRAIN, INITIAL ENCOUNTER: ICD-10-CM

## 2019-02-15 DIAGNOSIS — M25.572 ACUTE LEFT ANKLE PAIN: Primary | ICD-10-CM

## 2019-02-15 DIAGNOSIS — M76.62 ACHILLES TENDINITIS OF LEFT LOWER EXTREMITY: ICD-10-CM

## 2019-02-15 PROCEDURE — 73630 PR  X-RAY FOOT 3+ VW: ICD-10-PCS | Mod: LT,,, | Performed by: PODIATRIST

## 2019-02-15 PROCEDURE — 3077F PR MOST RECENT SYSTOLIC BLOOD PRESSURE >= 140 MM HG: ICD-10-PCS | Mod: ,,, | Performed by: PODIATRIST

## 2019-02-15 PROCEDURE — 3077F SYST BP >= 140 MM HG: CPT | Mod: ,,, | Performed by: PODIATRIST

## 2019-02-15 PROCEDURE — 3080F PR MOST RECENT DIASTOLIC BLOOD PRESSURE >= 90 MM HG: ICD-10-PCS | Mod: ,,, | Performed by: PODIATRIST

## 2019-02-15 PROCEDURE — 99203 OFFICE O/P NEW LOW 30 MIN: CPT | Mod: 25,,, | Performed by: PODIATRIST

## 2019-02-15 PROCEDURE — 73610 PR  X-RAY ANKLE 3+ VW: ICD-10-PCS | Mod: LT,,, | Performed by: PODIATRIST

## 2019-02-15 PROCEDURE — 3080F DIAST BP >= 90 MM HG: CPT | Mod: ,,, | Performed by: PODIATRIST

## 2019-02-15 PROCEDURE — 3008F PR BODY MASS INDEX (BMI) DOCUMENTED: ICD-10-PCS | Mod: ,,, | Performed by: PODIATRIST

## 2019-02-15 PROCEDURE — 99203 PR OFFICE/OUTPT VISIT, NEW, LEVL III, 30-44 MIN: ICD-10-PCS | Mod: 25,,, | Performed by: PODIATRIST

## 2019-02-15 PROCEDURE — 73610 X-RAY EXAM OF ANKLE: CPT | Mod: LT,,, | Performed by: PODIATRIST

## 2019-02-15 PROCEDURE — 73630 X-RAY EXAM OF FOOT: CPT | Mod: LT,,, | Performed by: PODIATRIST

## 2019-02-15 PROCEDURE — 3008F BODY MASS INDEX DOCD: CPT | Mod: ,,, | Performed by: PODIATRIST

## 2019-02-15 RX ORDER — NAPROXEN 500 MG/1
500 TABLET ORAL 2 TIMES DAILY
Qty: 90 TABLET | Refills: 1 | Status: SHIPPED | OUTPATIENT
Start: 2019-02-15 | End: 2019-08-08 | Stop reason: CLARIF

## 2019-02-15 RX ORDER — HYDROCODONE BITARTRATE AND ACETAMINOPHEN 10; 325 MG/1; MG/1
1 TABLET ORAL EVERY 6 HOURS PRN
Qty: 30 TABLET | Refills: 0 | Status: SHIPPED | OUTPATIENT
Start: 2019-02-15 | End: 2019-03-01

## 2019-02-15 NOTE — PROGRESS NOTES
[address] [facility] [address]  [place] [address]  Phone: [phone]   [phone]    Patient's PCP:Liss Raza MD  Referring Provider: No ref. provider found    Subjective:      Chief Complaint:: Ankle Injury (left)    CHETAN Shipman is a 53 y.o. male who presents with a complaint of  Left ankle injury lasting for one day. Onset of the symptoms was stepping out of truck yesterday and falling.  Current symptoms include swelling and pain.  Aggravating factors are bearing weight. Symptoms have not improved since injury.Treatment to date have included icey hot. Patients rates pain 10/10 on pain scale. Patient presents to the office with a high blood pressure. Patient has been notified to see PCP Doctor: Liss Raza          Vitals:    02/15/19 1439   BP: (!) 212/133   Pulse:    Temp:      Shoe Size: 13    Past Surgical History:   Procedure Laterality Date    HAND SURGERY Left     20 y/o, laceration repair    HERNIA REPAIR      umbilical hernia     Past Medical History:   Diagnosis Date    Hyperlipidemia     Hypertension      Family History   Problem Relation Age of Onset    Heart disease Mother         CAD    Hypertension Mother     Cancer Sister         breast    Heart disease Sister 50    Heart disease Brother         MI    Stroke Brother     Heart disease Brother         MI    Heart disease Brother         MI        Social History:   Marital Status:   Alcohol History:  reports that he drinks alcohol.  Tobacco History:  reports that he has quit smoking. He quit smokeless tobacco use about 18 years ago.  Drug History:  reports that he does not use drugs.    Review of patient's allergies indicates:   Allergen Reactions    Tetanus vaccines and toxoid Anaphylaxis, Hives and Rash       Current Outpatient Medications   Medication Sig Dispense Refill    atenolol-chlorthalidone (TENORETIC) 100-25 mg per tablet TAKE 1 TABLET BY MOUTH ONCE DAILY. 90 tablet 1    esomeprazole (NEXIUM)  40 MG capsule Take 40 mg by mouth before breakfast.      lisinopril (PRINIVIL,ZESTRIL) 40 MG tablet TAKE ONE TABLET BY MOUTH ONCE DAILY 90 tablet 3    HYDROcodone-acetaminophen (NORCO)  mg per tablet Take 1 tablet by mouth every 6 (six) hours as needed for Pain. 30 tablet 0    naproxen (NAPROSYN) 500 MG tablet Take 1 tablet (500 mg total) by mouth 2 (two) times daily. 90 tablet 1    zolpidem (AMBIEN) 10 mg Tab Take 1 tablet (10 mg total) by mouth nightly as needed. 30 tablet 1     No current facility-administered medications for this visit.        Review of Systems   Constitutional: Negative for chills, fatigue, fever and unexpected weight change.   HENT: Negative for hearing loss and trouble swallowing.    Eyes: Negative for photophobia and visual disturbance.   Respiratory: Negative for cough, shortness of breath and wheezing.    Cardiovascular: Positive for leg swelling. Negative for chest pain and palpitations.   Gastrointestinal: Positive for nausea. Negative for abdominal pain.   Genitourinary: Negative for dysuria and frequency.   Musculoskeletal: Negative for arthralgias, back pain and joint swelling.   Skin: Negative for rash.   Neurological: Negative for tremors, seizures, weakness, numbness and headaches.   Hematological: Does not bruise/bleed easily.         Objective:        Physical Exam:   Foot Exam    General  General Appearance: appears stated age and healthy   Orientation: alert and oriented to person, place, and time   Affect: appropriate   Gait: steppage       Left Foot/Ankle      Inspection and Palpation  Ecchymosis: none  Tenderness: (Pain entire length of the left Achilles tendon. Pain radiates)  Swelling: (Swelling left ankle and posterior lung Achilles tendon and gastrocnemius aponeurosis.)  Arch: normal  Hammertoes: absent  Claw toes: absent  Hallux valgus: no  Hallux limitus: no  Skin Exam: skin intact;     Neurovascular  Dorsalis pedis: 2+  Posterior tibial: 2+    Range of  Motion    Passive  Ankle dorsiflexion: 0, pain  Plantar flexion: 5, pain    Active  Ankle dorsiflexion: 0, pain  Plantar flexion: 5, pain    Tests  Hardy squeeze: negative           Physical Exam   Cardiovascular:   Pulses:       Dorsalis pedis pulses are 2+ on the left side.        Posterior tibial pulses are 2+ on the left side.       Imaging:   X-Ray Foot Complete Left  AP, lateral, axial calcaneal weightbearing left foot:  No acute fractures, no bone tumors, no soft tissue masses. No changes in the insertion of the Achilles tendon.  X-Ray Ankle Complete Left  AP, lateral, lateral oblique weightbearing ankle views left:  No acute fractures, no bone tumors, no soft tissue masses. No obvious defects seen at Achilles tendon insertion in the soft tissue.           Assessment:       1. Acute left ankle pain    2. Achilles tendinitis of left lower extremity    3. Moderate left ankle sprain, initial encounter      Plan:   Acute left ankle pain  -     X-Ray Ankle Complete Left  -     X-Ray Foot Complete Left    Achilles tendinitis of left lower extremity    Moderate left ankle sprain, initial encounter    Other orders  -     HYDROcodone-acetaminophen (NORCO)  mg per tablet; Take 1 tablet by mouth every 6 (six) hours as needed for Pain.  Dispense: 30 tablet; Refill: 0  -     naproxen (NAPROSYN) 500 MG tablet; Take 1 tablet (500 mg total) by mouth 2 (two) times daily.  Dispense: 90 tablet; Refill: 1    Instructions as follows: Ice 15 minutes once a day, Cam Walker boot cast with heel elevated at 2 weeks. Prescription for Norco 10/325, Naprosyn 500 mg. In 2 weeks if pain is gone he can go back to issue with the heel lift. If pain is still present 2 weeks and not showing any improvement is return for possible MRI to rule out partial tear of Achilles tendon. If pain is improving not completely gone the near use the boot cast for another 2 weeks. He may return to work sent his chair to ice and elevate as needed was  given a note for work.  Follow-up in about 4 weeks (around 3/15/2019), or if symptoms worsen or fail to improve MRI will be ordered.    Procedures - None    Counseling:     I provided patient education verbally regarding:   Patient diagnosis, treatment options, as well as alternatives, risks, and benefits.   I discussed conservative treatment of minor tendon tear with cast for 6 to 8 weeks, MRI, ultrasound if needed for evaluation of the rupture and possible need for surgical repair.  This note was created using Dragon voice recognition software that occasionally misinterpreted phrases or words.

## 2019-03-01 ENCOUNTER — TELEPHONE (OUTPATIENT)
Dept: PODIATRY | Facility: CLINIC | Age: 54
End: 2019-03-01

## 2019-03-01 NOTE — TELEPHONE ENCOUNTER
Pt called to let us know that his leg is feeling better. Has a follow up appt in 4 weeks, but wanted to know if still needed to wear boot cast.    I told pt to wear bootcast for now and will call back Monday when we receive an answer from doctor.      Please advise if pt still needs to wear boot cast.

## 2019-03-06 NOTE — TELEPHONE ENCOUNTER
Tell pt to stop cast boot and put heel lift in running shoes and continue ice and naprosyn.  If no pain in two weeks does not need to see me. If pain returns use boot cast and see me.

## 2019-03-14 DIAGNOSIS — Z12.11 COLON CANCER SCREENING: ICD-10-CM

## 2019-03-26 ENCOUNTER — PATIENT MESSAGE (OUTPATIENT)
Dept: ADMINISTRATIVE | Facility: HOSPITAL | Age: 54
End: 2019-03-26

## 2019-07-10 ENCOUNTER — OFFICE VISIT (OUTPATIENT)
Dept: FAMILY MEDICINE | Facility: CLINIC | Age: 54
End: 2019-07-10
Payer: COMMERCIAL

## 2019-07-10 DIAGNOSIS — D50.9 IRON DEFICIENCY ANEMIA, UNSPECIFIED IRON DEFICIENCY ANEMIA TYPE: ICD-10-CM

## 2019-07-10 DIAGNOSIS — Z82.49 FAMILY HISTORY OF PREMATURE CAD: ICD-10-CM

## 2019-07-10 DIAGNOSIS — J01.41 ACUTE RECURRENT PANSINUSITIS: Primary | ICD-10-CM

## 2019-07-10 DIAGNOSIS — E55.9 VITAMIN D DEFICIENCY: ICD-10-CM

## 2019-07-10 DIAGNOSIS — I10 SEVERE UNCONTROLLED HYPERTENSION: ICD-10-CM

## 2019-07-10 DIAGNOSIS — Z91.89 RISK FOR CORONARY ARTERY DISEASE GREATER THAN 20% IN NEXT 10 YEARS PER FRAMINGHAM SCORE: ICD-10-CM

## 2019-07-10 DIAGNOSIS — E66.01 OBESITY, MORBID, BMI 40.0-49.9: ICD-10-CM

## 2019-07-10 DIAGNOSIS — R05.9 COUGH: ICD-10-CM

## 2019-07-10 DIAGNOSIS — E78.2 HYPERLIPIDEMIA, MIXED: ICD-10-CM

## 2019-07-10 PROCEDURE — 99214 OFFICE O/P EST MOD 30 MIN: CPT | Mod: 25,S$GLB,, | Performed by: NURSE PRACTITIONER

## 2019-07-10 PROCEDURE — 96372 PR INJECTION,THERAP/PROPH/DIAG2ST, IM OR SUBCUT: ICD-10-PCS | Mod: S$GLB,,, | Performed by: NURSE PRACTITIONER

## 2019-07-10 PROCEDURE — 3008F BODY MASS INDEX DOCD: CPT | Mod: CPTII,S$GLB,, | Performed by: NURSE PRACTITIONER

## 2019-07-10 PROCEDURE — 99999 PR PBB SHADOW E&M-EST. PATIENT-LVL IV: ICD-10-PCS | Mod: PBBFAC,,, | Performed by: NURSE PRACTITIONER

## 2019-07-10 PROCEDURE — 3080F PR MOST RECENT DIASTOLIC BLOOD PRESSURE >= 90 MM HG: ICD-10-PCS | Mod: CPTII,S$GLB,, | Performed by: NURSE PRACTITIONER

## 2019-07-10 PROCEDURE — 99214 PR OFFICE/OUTPT VISIT, EST, LEVL IV, 30-39 MIN: ICD-10-PCS | Mod: 25,S$GLB,, | Performed by: NURSE PRACTITIONER

## 2019-07-10 PROCEDURE — 3008F PR BODY MASS INDEX (BMI) DOCUMENTED: ICD-10-PCS | Mod: CPTII,S$GLB,, | Performed by: NURSE PRACTITIONER

## 2019-07-10 PROCEDURE — 99999 PR PBB SHADOW E&M-EST. PATIENT-LVL IV: CPT | Mod: PBBFAC,,, | Performed by: NURSE PRACTITIONER

## 2019-07-10 PROCEDURE — 3077F PR MOST RECENT SYSTOLIC BLOOD PRESSURE >= 140 MM HG: ICD-10-PCS | Mod: CPTII,S$GLB,, | Performed by: NURSE PRACTITIONER

## 2019-07-10 PROCEDURE — 96372 THER/PROPH/DIAG INJ SC/IM: CPT | Mod: S$GLB,,, | Performed by: NURSE PRACTITIONER

## 2019-07-10 PROCEDURE — 3077F SYST BP >= 140 MM HG: CPT | Mod: CPTII,S$GLB,, | Performed by: NURSE PRACTITIONER

## 2019-07-10 PROCEDURE — 3080F DIAST BP >= 90 MM HG: CPT | Mod: CPTII,S$GLB,, | Performed by: NURSE PRACTITIONER

## 2019-07-10 RX ORDER — LEVOCETIRIZINE DIHYDROCHLORIDE 5 MG/1
5 TABLET, FILM COATED ORAL NIGHTLY
Qty: 30 TABLET | Refills: 11 | Status: SHIPPED | OUTPATIENT
Start: 2019-07-10 | End: 2019-07-16

## 2019-07-10 RX ORDER — AZITHROMYCIN 250 MG/1
TABLET, FILM COATED ORAL
Qty: 6 TABLET | Refills: 0 | Status: SHIPPED | OUTPATIENT
Start: 2019-07-10 | End: 2019-07-16

## 2019-07-10 RX ORDER — FLUTICASONE PROPIONATE 50 MCG
1 SPRAY, SUSPENSION (ML) NASAL DAILY
Qty: 1 BOTTLE | Refills: 2 | Status: SHIPPED | OUTPATIENT
Start: 2019-07-10 | End: 2019-10-15 | Stop reason: SDUPTHER

## 2019-07-10 RX ORDER — BETAMETHASONE SODIUM PHOSPHATE AND BETAMETHASONE ACETATE 3; 3 MG/ML; MG/ML
6 INJECTION, SUSPENSION INTRA-ARTICULAR; INTRALESIONAL; INTRAMUSCULAR; SOFT TISSUE ONCE
Status: COMPLETED | OUTPATIENT
Start: 2019-07-10 | End: 2019-07-10

## 2019-07-10 RX ADMIN — BETAMETHASONE SODIUM PHOSPHATE AND BETAMETHASONE ACETATE 6 MG: 3; 3 INJECTION, SUSPENSION INTRA-ARTICULAR; INTRALESIONAL; INTRAMUSCULAR; SOFT TISSUE at 06:07

## 2019-07-10 NOTE — PROGRESS NOTES
"Subjective:       Patient ID: Be Shipman is a 53 y.o. male.    Chief Complaint: Sinus Problem (HA's, nasal congestion)    Patient presents today with complains of chronic sinus pain. Patient manual /130, fifteen minutes later manual /110. Patient taking Tenoretic and  Lisinopril, patient verbalizes compliance with both meds.Patient refuses ER admission. He reports diagnosis of hypertension with prescribed medication at [age] years old. Patient denies HA, blurred vision, chest pain, peripheral edema. He complains of insomnia, he verbaizes  "I feel like I'm smothering". He refused sleep apnea study- wants chest xray first.    Sinus Problem   This is a recurrent problem. The current episode started 1 to 4 weeks ago. The problem has been waxing and waning since onset. There has been no fever. His pain is at a severity of 3/10. The pain is mild. Associated symptoms include congestion and sinus pressure. Pertinent negatives include no headaches, hoarse voice, shortness of breath, sneezing or sore throat. Past treatments include nothing.   Hypertension   This is a chronic problem. The current episode started more than 1 year ago. The problem has been rapidly worsening since onset. The problem is resistant. Associated symptoms include orthopnea. Pertinent negatives include no anxiety, blurred vision, chest pain, headaches, malaise/fatigue, palpitations, peripheral edema or shortness of breath. There are no associated agents to hypertension. Risk factors for coronary artery disease include male gender, obesity and family history. Past treatments include ACE inhibitors and beta blockers. The current treatment provides no improvement. There are no compliance problems.        Past Medical History:   Diagnosis Date    Hyperlipidemia     Hypertension        Review of patient's allergies indicates:   Allergen Reactions    Tetanus vaccines and toxoid Anaphylaxis, Hives and Rash         Current Outpatient " Medications:     atenolol-chlorthalidone (TENORETIC) 100-25 mg per tablet, TAKE 1 TABLET BY MOUTH ONCE DAILY., Disp: 90 tablet, Rfl: 1    atorvastatin (LIPITOR) 40 MG tablet, Take 1 tablet (40 mg total) by mouth once daily., Disp: 90 tablet, Rfl: 3    azithromycin (Z-MIKAELA) 250 MG tablet, Take 2 tablets by mouth on day 1; Take 1 tablet by mouth on days 2-5, Disp: 6 tablet, Rfl: 0    esomeprazole (NEXIUM) 40 MG capsule, Take 40 mg by mouth before breakfast., Disp: , Rfl:     fluticasone propionate (FLONASE) 50 mcg/actuation nasal spray, 1 spray (50 mcg total) by Each Nare route once daily., Disp: 1 Bottle, Rfl: 2    levocetirizine (XYZAL) 5 MG tablet, Take 1 tablet (5 mg total) by mouth every evening., Disp: 30 tablet, Rfl: 11    lisinopril (PRINIVIL,ZESTRIL) 40 MG tablet, TAKE ONE TABLET BY MOUTH ONCE DAILY, Disp: 90 tablet, Rfl: 3    naproxen (NAPROSYN) 500 MG tablet, Take 1 tablet (500 mg total) by mouth 2 (two) times daily., Disp: 90 tablet, Rfl: 1    zolpidem (AMBIEN) 10 mg Tab, Take 1 tablet (10 mg total) by mouth nightly as needed., Disp: 30 tablet, Rfl: 1    Review of Systems   Constitutional: Negative for malaise/fatigue and unexpected weight change.   HENT: Positive for congestion and sinus pressure. Negative for hoarse voice, sneezing, sore throat and trouble swallowing.    Eyes: Negative for blurred vision and visual disturbance.   Respiratory: Negative for shortness of breath.    Cardiovascular: Positive for orthopnea. Negative for chest pain, palpitations and leg swelling.   Gastrointestinal: Negative for blood in stool.   Genitourinary: Negative for hematuria.   Skin: Negative for rash.   Allergic/Immunologic: Negative for immunocompromised state.   Neurological: Negative for headaches.   Hematological: Does not bruise/bleed easily.   Psychiatric/Behavioral: Negative for agitation. The patient is not nervous/anxious.        Objective:      BP (!) 190/110 (BP Location: Right arm, Patient  "Position: Sitting, BP Method: Large (Manual))   Pulse 109   Temp 98.2 °F (36.8 °C)   Ht 5' 11" (1.803 m)   Wt (!) 141.8 kg (312 lb 9.8 oz)   SpO2 97%   BMI 43.60 kg/m²   Physical Exam   Constitutional: He is oriented to person, place, and time. He appears well-developed and well-nourished.   HENT:   Nose: Mucosal edema present. Right sinus exhibits maxillary sinus tenderness and frontal sinus tenderness. Left sinus exhibits maxillary sinus tenderness and frontal sinus tenderness.        Eyes: Pupils are equal, round, and reactive to light. Conjunctivae and EOM are normal.   Neck: Normal range of motion. Neck supple.   Cardiovascular: Normal rate, regular rhythm, normal heart sounds and intact distal pulses.   Pulmonary/Chest: Effort normal and breath sounds normal.   Abdominal: Soft. Bowel sounds are normal.   Musculoskeletal: Normal range of motion.   Neurological: He is alert and oriented to person, place, and time.   Skin: Skin is warm and dry.   Psychiatric: He has a normal mood and affect. His behavior is normal. Judgment and thought content normal.       Assessment:       1. Acute recurrent pansinusitis    2. Cough    3. Severe uncontrolled hypertension    4. Family history of premature CAD    5. Hyperlipidemia, mixed    6. Iron deficiency anemia, unspecified iron deficiency anemia type    7. Vitamin D deficiency    8. Obesity, morbid, BMI 40.0-49.9    9. Risk for coronary artery disease greater than 20% in next 10 years per [place] score        Plan:       Acute recurrent pansinusitis  -     fluticasone propionate (FLONASE) 50 mcg/actuation nasal spray; 1 spray (50 mcg total) by Each Nare route once daily.  Dispense: 1 Bottle; Refill: 2  -     betamethasone acetate-betamethasone sodium phosphate injection 6 mg  -     azithromycin (Z-MIKAELA) 250 MG tablet; Take 2 tablets by mouth on day 1; Take 1 tablet by mouth on days 2-5  Dispense: 6 tablet; Refill: 0  -     levocetirizine (XYZAL) 5 MG tablet; Take 1 " "tablet (5 mg total) by mouth every evening.  Dispense: 30 tablet; Refill: 11    Cough  -     X-Ray Chest PA And Lateral; Future; Expected date: 07/10/2019    Severe uncontrolled hypertension  -     Comprehensive metabolic panel; Future; Expected date: 07/11/2019  Refused treatment  BP Readings from Last 3 Encounters:   07/10/19 (!) 190/110   02/15/19 (!) 212/133   08/02/17 130/82     Family history of premature CAD  -     Lipid panel; Future; Expected date: 07/11/2019    Hyperlipidemia, mixed  -     Lipid panel; Future; Expected date: 07/11/2019   atorvastatin (LIPITOR) 40 MG tablet; Take 1 tablet (40 mg total) by mouth once daily.    The 10-year ASCVD risk score (Boogie OLGUIN Jr., et al., 2013) is: 20.4%    Values used to calculate the score:      Age: 53 years      Sex: Male      Is Non- : No      Diabetic: No      Tobacco smoker: No      Systolic Blood Pressure: 190 mmHg      Is BP treated: Yes      HDL Cholesterol: 32 mg/dL      Total Cholesterol: 260 mg/dL     Risk for coronary artery disease greater than 20% in next 10 years per [place] score  -     atorvastatin (LIPITOR) 40 MG tablet; Take 1 tablet (40 mg total) by mouth once daily.    Iron deficiency anemia, unspecified iron deficiency anemia type  -     CBC W/ AUTO DIFFERENTIAL; Future; Expected date: 07/11/2019  -     Iron and TIBC; Future; Expected date: 07/11/2019  -     FERRITIN; Future; Expected date: 07/11/2019    Vitamin D deficiency  -     Vitamin D; Future; Expected date: 07/11/2019    Obesity, morbid, BMI 40.0-49.9  Counseled patient on his ideal body weight, health consequences of being obese and current recommendations including weekly exercise and a heart healthy diet.  Current BMI is:Estimated body mass index is 43.6 kg/m² as calculated from the following:    Height as of this encounter: 5' 11" (1.803 m).    Weight as of this encounter: 141.8 kg (312 lb 9.8 oz)..  Patient is aware that ideal BMI < 25 or Weight in (lb) to " have BMI = 25: 178.9.              Patient readiness: acceptance and barriers:none    During the course of the visit the patient was educated and counseled about the following:     Hypertension:   Dietary sodium restriction.  Regular aerobic exercise.  Obesity:   General weight loss/lifestyle modification strategies discussed (elicit support from others; identify saboteurs; non-food rewards, etc).  Regular aerobic exercise program discussed.    Goals: Hypertension: Reduce Blood Pressure and Obesity: Reduce calorie intake and BMI    Did patient meet goals/outcomes: No    The following self management tools provided: declined    Patient Instructions (the written plan) was given to the patient/family.     Time spent with patient: 45 minutes    Barriers to medications present (yes )    Adverse reactions to current medications (no)    Over the counter medications reviewed (Yes)

## 2019-07-10 NOTE — PROGRESS NOTES
Patient verified by name and . Patient received 6 betamethasone in right ventrogluteal. Patient tolerated injection well. Patient advised to wait in clinic for 15 minutes in case of adverse reactions. Patient demonstrated understanding.

## 2019-07-11 ENCOUNTER — HOSPITAL ENCOUNTER (OUTPATIENT)
Dept: RADIOLOGY | Facility: CLINIC | Age: 54
Discharge: HOME | End: 2019-07-11
Attending: NURSE PRACTITIONER
Payer: COMMERCIAL

## 2019-07-11 VITALS
HEART RATE: 109 BPM | HEIGHT: 71 IN | WEIGHT: 312.63 LBS | TEMPERATURE: 98 F | OXYGEN SATURATION: 97 % | DIASTOLIC BLOOD PRESSURE: 110 MMHG | SYSTOLIC BLOOD PRESSURE: 190 MMHG | BODY MASS INDEX: 43.77 KG/M2

## 2019-07-11 DIAGNOSIS — R05.9 COUGH: ICD-10-CM

## 2019-07-11 DIAGNOSIS — I1A.0 RESISTANT HYPERTENSION: Primary | ICD-10-CM

## 2019-07-11 PROCEDURE — 71046 X-RAY EXAM CHEST 2 VIEWS: CPT | Mod: TC,FY,PO

## 2019-07-11 PROCEDURE — 71046 X-RAY EXAM CHEST 2 VIEWS: CPT | Mod: 26,,, | Performed by: RADIOLOGY

## 2019-07-11 PROCEDURE — 71046 XR CHEST PA AND LATERAL: ICD-10-PCS | Mod: 26,,, | Performed by: RADIOLOGY

## 2019-07-11 RX ORDER — ATORVASTATIN CALCIUM 40 MG/1
40 TABLET, FILM COATED ORAL DAILY
Qty: 90 TABLET | Refills: 3 | Status: SHIPPED | OUTPATIENT
Start: 2019-07-11 | End: 2020-07-16 | Stop reason: SDUPTHER

## 2019-07-12 ENCOUNTER — PATIENT MESSAGE (OUTPATIENT)
Dept: CARDIOLOGY | Facility: CLINIC | Age: 54
End: 2019-07-12

## 2019-07-16 ENCOUNTER — LAB VISIT (OUTPATIENT)
Dept: LAB | Facility: HOSPITAL | Age: 54
End: 2019-07-16
Attending: INTERNAL MEDICINE
Payer: COMMERCIAL

## 2019-07-16 ENCOUNTER — OFFICE VISIT (OUTPATIENT)
Dept: CARDIOLOGY | Facility: CLINIC | Age: 54
End: 2019-07-16
Payer: COMMERCIAL

## 2019-07-16 VITALS
DIASTOLIC BLOOD PRESSURE: 132 MMHG | HEIGHT: 71 IN | SYSTOLIC BLOOD PRESSURE: 222 MMHG | HEART RATE: 100 BPM | WEIGHT: 315 LBS | BODY MASS INDEX: 44.1 KG/M2

## 2019-07-16 DIAGNOSIS — R07.9 CHEST PAIN, UNSPECIFIED TYPE: ICD-10-CM

## 2019-07-16 DIAGNOSIS — I20.89 ANGINA OF EFFORT: ICD-10-CM

## 2019-07-16 DIAGNOSIS — Z82.49 FAMILY HISTORY OF PREMATURE CAD: ICD-10-CM

## 2019-07-16 DIAGNOSIS — I10 ESSENTIAL HYPERTENSION: ICD-10-CM

## 2019-07-16 DIAGNOSIS — R07.89 OTHER CHEST PAIN: ICD-10-CM

## 2019-07-16 DIAGNOSIS — E66.01 MORBID OBESITY: ICD-10-CM

## 2019-07-16 DIAGNOSIS — R06.02 SOB (SHORTNESS OF BREATH): ICD-10-CM

## 2019-07-16 DIAGNOSIS — I20.89 ANGINA OF EFFORT: Primary | ICD-10-CM

## 2019-07-16 DIAGNOSIS — E78.2 HYPERLIPIDEMIA, MIXED: ICD-10-CM

## 2019-07-16 LAB
ALBUMIN SERPL BCP-MCNC: 3.7 G/DL (ref 3.5–5.2)
ALP SERPL-CCNC: 108 U/L (ref 55–135)
ALT SERPL W/O P-5'-P-CCNC: 38 U/L (ref 10–44)
ANION GAP SERPL CALC-SCNC: 11 MMOL/L (ref 8–16)
AST SERPL-CCNC: 32 U/L (ref 10–40)
BASOPHILS # BLD AUTO: 0.08 K/UL (ref 0–0.2)
BASOPHILS NFR BLD: 0.7 % (ref 0–1.9)
BILIRUB SERPL-MCNC: 0.4 MG/DL (ref 0.1–1)
BUN SERPL-MCNC: 20 MG/DL (ref 6–20)
CALCIUM SERPL-MCNC: 10 MG/DL (ref 8.7–10.5)
CHLORIDE SERPL-SCNC: 104 MMOL/L (ref 95–110)
CO2 SERPL-SCNC: 27 MMOL/L (ref 23–29)
CREAT SERPL-MCNC: 1.1 MG/DL (ref 0.5–1.4)
DIFFERENTIAL METHOD: ABNORMAL
EOSINOPHIL # BLD AUTO: 0.2 K/UL (ref 0–0.5)
EOSINOPHIL NFR BLD: 1.4 % (ref 0–8)
ERYTHROCYTE [DISTWIDTH] IN BLOOD BY AUTOMATED COUNT: 14.1 % (ref 11.5–14.5)
EST. GFR  (AFRICAN AMERICAN): >60 ML/MIN/1.73 M^2
EST. GFR  (NON AFRICAN AMERICAN): >60 ML/MIN/1.73 M^2
GLUCOSE SERPL-MCNC: 94 MG/DL (ref 70–110)
HCT VFR BLD AUTO: 46.7 % (ref 40–54)
HGB BLD-MCNC: 14.8 G/DL (ref 14–18)
IMM GRANULOCYTES # BLD AUTO: 0.04 K/UL (ref 0–0.04)
IMM GRANULOCYTES NFR BLD AUTO: 0.3 % (ref 0–0.5)
LYMPHOCYTES # BLD AUTO: 3.1 K/UL (ref 1–4.8)
LYMPHOCYTES NFR BLD: 26.7 % (ref 18–48)
MCH RBC QN AUTO: 28.2 PG (ref 27–31)
MCHC RBC AUTO-ENTMCNC: 31.7 G/DL (ref 32–36)
MCV RBC AUTO: 89 FL (ref 82–98)
MONOCYTES # BLD AUTO: 1.2 K/UL (ref 0.3–1)
MONOCYTES NFR BLD: 10.2 % (ref 4–15)
NEUTROPHILS # BLD AUTO: 7.2 K/UL (ref 1.8–7.7)
NEUTROPHILS NFR BLD: 60.7 % (ref 38–73)
NRBC BLD-RTO: 0 /100 WBC
PLATELET # BLD AUTO: 229 K/UL (ref 150–350)
PMV BLD AUTO: 11.6 FL (ref 9.2–12.9)
POTASSIUM SERPL-SCNC: 4.3 MMOL/L (ref 3.5–5.1)
PROT SERPL-MCNC: 7.2 G/DL (ref 6–8.4)
RBC # BLD AUTO: 5.24 M/UL (ref 4.6–6.2)
SODIUM SERPL-SCNC: 142 MMOL/L (ref 136–145)
TSH SERPL DL<=0.005 MIU/L-ACNC: 1.37 UIU/ML (ref 0.4–4)
WBC # BLD AUTO: 11.78 K/UL (ref 3.9–12.7)

## 2019-07-16 PROCEDURE — 80053 COMPREHEN METABOLIC PANEL: CPT

## 2019-07-16 PROCEDURE — 99204 PR OFFICE/OUTPT VISIT, NEW, LEVL IV, 45-59 MIN: ICD-10-PCS | Mod: S$GLB,,, | Performed by: INTERNAL MEDICINE

## 2019-07-16 PROCEDURE — 84443 ASSAY THYROID STIM HORMONE: CPT

## 2019-07-16 PROCEDURE — 99999 PR PBB SHADOW E&M-EST. PATIENT-LVL III: ICD-10-PCS | Mod: PBBFAC,,, | Performed by: INTERNAL MEDICINE

## 2019-07-16 PROCEDURE — 3008F BODY MASS INDEX DOCD: CPT | Mod: CPTII,S$GLB,, | Performed by: INTERNAL MEDICINE

## 2019-07-16 PROCEDURE — 3080F DIAST BP >= 90 MM HG: CPT | Mod: CPTII,S$GLB,, | Performed by: INTERNAL MEDICINE

## 2019-07-16 PROCEDURE — 93000 ELECTROCARDIOGRAM COMPLETE: CPT | Mod: S$GLB,,, | Performed by: INTERNAL MEDICINE

## 2019-07-16 PROCEDURE — 99999 PR PBB SHADOW E&M-EST. PATIENT-LVL III: CPT | Mod: PBBFAC,,, | Performed by: INTERNAL MEDICINE

## 2019-07-16 PROCEDURE — 93000 EKG 12-LEAD: ICD-10-PCS | Mod: S$GLB,,, | Performed by: INTERNAL MEDICINE

## 2019-07-16 PROCEDURE — 36415 COLL VENOUS BLD VENIPUNCTURE: CPT | Mod: PO

## 2019-07-16 PROCEDURE — 3077F SYST BP >= 140 MM HG: CPT | Mod: CPTII,S$GLB,, | Performed by: INTERNAL MEDICINE

## 2019-07-16 PROCEDURE — 3080F PR MOST RECENT DIASTOLIC BLOOD PRESSURE >= 90 MM HG: ICD-10-PCS | Mod: CPTII,S$GLB,, | Performed by: INTERNAL MEDICINE

## 2019-07-16 PROCEDURE — 3008F PR BODY MASS INDEX (BMI) DOCUMENTED: ICD-10-PCS | Mod: CPTII,S$GLB,, | Performed by: INTERNAL MEDICINE

## 2019-07-16 PROCEDURE — 3077F PR MOST RECENT SYSTOLIC BLOOD PRESSURE >= 140 MM HG: ICD-10-PCS | Mod: CPTII,S$GLB,, | Performed by: INTERNAL MEDICINE

## 2019-07-16 PROCEDURE — 85025 COMPLETE CBC W/AUTO DIFF WBC: CPT

## 2019-07-16 PROCEDURE — 99204 OFFICE O/P NEW MOD 45 MIN: CPT | Mod: S$GLB,,, | Performed by: INTERNAL MEDICINE

## 2019-07-16 RX ORDER — OLMESARTAN MEDOXOMIL 40 MG/1
40 TABLET ORAL DAILY
Qty: 90 TABLET | Refills: 3 | Status: SHIPPED | OUTPATIENT
Start: 2019-07-16 | End: 2020-07-06

## 2019-07-16 RX ORDER — ATENOLOL AND CHLORTHALIDONE TABLET 100; 25 MG/1; MG/1
1 TABLET ORAL DAILY
Qty: 90 TABLET | Refills: 3 | Status: SHIPPED | OUTPATIENT
Start: 2019-07-16 | End: 2020-07-06

## 2019-07-16 NOTE — PROGRESS NOTES
Subjective:    Patient ID:  Be Shipman is a 53 y.o. male who presents for evaluation of Hypertension; Shortness of Breath; and Hyperlipidemia      Pt was referred for cardiac evaluation. He had been SOB with minimal exertion for months. Has chest pains and feels like he is smothering at times. He has not taken any BP medicines for about 6-8 months. He said the meds made him cough and he did not feel they were effective. He has a multiple family members with early heart disease including parents and several siblings.       Review of Systems   Constitution: Negative for weight gain and weight loss.   HENT: Negative.    Eyes: Negative.    Cardiovascular: Positive for chest pain, dyspnea on exertion, leg swelling and orthopnea. Negative for claudication, cyanosis, irregular heartbeat, near-syncope, palpitations and syncope.   Respiratory: Positive for shortness of breath. Negative for cough, hemoptysis and snoring.    Endocrine: Negative.    Skin: Negative.    Musculoskeletal: Negative for joint pain, muscle cramps, muscle weakness and myalgias.   Gastrointestinal: Negative for diarrhea, hematemesis, nausea and vomiting.   Genitourinary: Negative.    Neurological: Negative for dizziness, focal weakness, light-headedness, loss of balance, numbness, paresthesias and seizures.   Psychiatric/Behavioral: Negative.         Objective:    Physical Exam   Constitutional: He is oriented to person, place, and time. He appears well-developed and well-nourished.   HENT:   Mouth/Throat: Oropharynx is clear and moist.   Eyes: Pupils are equal, round, and reactive to light.   Neck: Normal range of motion. No thyromegaly present.   Cardiovascular: Normal rate, regular rhythm, S1 normal, S2 normal, normal heart sounds, intact distal pulses and normal pulses.  No extrasystoles are present. PMI is not displaced. Exam reveals no friction rub.   No murmur heard.  Pulmonary/Chest: Effort normal and breath sounds normal. He has no  wheezes. He has no rales. He exhibits no tenderness.   Abdominal: Soft. Bowel sounds are normal. He exhibits no distension and no mass. There is no tenderness.   Musculoskeletal: Normal range of motion. He exhibits edema (1+).   Neurological: He is alert and oriented to person, place, and time.   Skin: Skin is warm and dry.   Vitals reviewed.        Assessment:       1. Angina of effort    2. Chest pain, unspecified type    3. Essential hypertension    4. Hyperlipidemia, mixed    5. Family history of premature CAD    6. SOB (shortness of breath)    7. Other chest pain    8. Morbid obesity         Plan:       We had a long discussion about his Elevated BP, Chest pains and severe NICKERSON - We also discussed his non-compliance with his meds as a major contributor to his present condition.  I suggested he be admitted for workup and for BP control - he was adamant in his refusal  He was agreeable to getting back on BP meds and doing a w/u.  Will restart atenolol-chlorthalidone 100/25 daily  Change lisinopril to olmesartan 40 mg daily  CMP; CBC; TSH  SPECT stress - Pt unable to adequately walk treadmill  Echo  He will start monitoring his BP  F/u 1 month

## 2019-07-29 ENCOUNTER — TELEPHONE (OUTPATIENT)
Dept: FAMILY MEDICINE | Facility: CLINIC | Age: 54
End: 2019-07-29

## 2019-07-31 ENCOUNTER — CLINICAL SUPPORT (OUTPATIENT)
Dept: CARDIOLOGY | Facility: CLINIC | Age: 54
End: 2019-07-31
Attending: INTERNAL MEDICINE
Payer: COMMERCIAL

## 2019-07-31 ENCOUNTER — HOSPITAL ENCOUNTER (OUTPATIENT)
Dept: RADIOLOGY | Facility: HOSPITAL | Age: 54
Discharge: HOME | End: 2019-07-31
Attending: INTERNAL MEDICINE
Payer: COMMERCIAL

## 2019-07-31 VITALS — BODY MASS INDEX: 44.1 KG/M2 | WEIGHT: 315 LBS | HEIGHT: 71 IN

## 2019-07-31 VITALS
WEIGHT: 315 LBS | BODY MASS INDEX: 44.1 KG/M2 | DIASTOLIC BLOOD PRESSURE: 123 MMHG | HEIGHT: 71 IN | SYSTOLIC BLOOD PRESSURE: 184 MMHG | HEART RATE: 71 BPM

## 2019-07-31 DIAGNOSIS — Z82.49 FAMILY HISTORY OF PREMATURE CAD: ICD-10-CM

## 2019-07-31 DIAGNOSIS — E78.2 HYPERLIPIDEMIA, MIXED: ICD-10-CM

## 2019-07-31 DIAGNOSIS — R06.02 SOB (SHORTNESS OF BREATH): ICD-10-CM

## 2019-07-31 DIAGNOSIS — I20.89 ANGINA OF EFFORT: ICD-10-CM

## 2019-07-31 DIAGNOSIS — I10 ESSENTIAL HYPERTENSION: ICD-10-CM

## 2019-07-31 DIAGNOSIS — R07.9 CHEST PAIN, UNSPECIFIED TYPE: ICD-10-CM

## 2019-07-31 DIAGNOSIS — E66.01 MORBID OBESITY: ICD-10-CM

## 2019-07-31 DIAGNOSIS — R07.89 OTHER CHEST PAIN: ICD-10-CM

## 2019-07-31 PROCEDURE — 78452 STRESS TEST WITH MYOCARDIAL PERFUSION (CUPID ONLY): ICD-10-PCS | Mod: 26,,, | Performed by: INTERNAL MEDICINE

## 2019-07-31 PROCEDURE — 99999 PR PBB SHADOW E&M-EST. PATIENT-LVL II: ICD-10-PCS | Mod: PBBFAC,,,

## 2019-07-31 PROCEDURE — 93015 STRESS TEST WITH MYOCARDIAL PERFUSION (CUPID ONLY): ICD-10-PCS | Mod: ,,, | Performed by: INTERNAL MEDICINE

## 2019-07-31 PROCEDURE — 78452 HT MUSCLE IMAGE SPECT MULT: CPT | Mod: 26,,, | Performed by: INTERNAL MEDICINE

## 2019-07-31 PROCEDURE — 93306 TTE W/DOPPLER COMPLETE: CPT | Mod: S$GLB,,, | Performed by: INTERNAL MEDICINE

## 2019-07-31 PROCEDURE — 93015 CV STRESS TEST SUPVJ I&R: CPT | Mod: ,,, | Performed by: INTERNAL MEDICINE

## 2019-07-31 PROCEDURE — 99999 PR PBB SHADOW E&M-EST. PATIENT-LVL II: CPT | Mod: PBBFAC,,,

## 2019-07-31 PROCEDURE — 93306 TRANSTHORACIC ECHO (TTE) COMPLETE (CUPID ONLY): ICD-10-PCS | Mod: S$GLB,,, | Performed by: INTERNAL MEDICINE

## 2019-08-01 LAB
ASCENDING AORTA: 3.66 CM
AV INDEX (PROSTH): 0.6
AV MEAN GRADIENT: 4 MMHG
AV PEAK GRADIENT: 7 MMHG
AV VALVE AREA: 2.6 CM2
AV VELOCITY RATIO: 0.65
BSA FOR ECHO PROCEDURE: 2.68 M2
CV ECHO LV RWT: 0.32 CM
CV STRESS BASE HR: 75 BPM
DIASTOLIC BLOOD PRESSURE: 123 MMHG
DOP CALC AO PEAK VEL: 1.29 M/S
DOP CALC AO VTI: 22.82 CM
DOP CALC LVOT AREA: 4.3 CM2
DOP CALC LVOT DIAMETER: 2.35 CM
DOP CALC LVOT PEAK VEL: 0.84 M/S
DOP CALC LVOT STROKE VOLUME: 59.31 CM3
DOP CALCLVOT PEAK VEL VTI: 13.68 CM
E WAVE DECELERATION TIME: 177.97 MSEC
E/A RATIO: 1.79
E/E' RATIO: 24.22 M/S
ECHO LV POSTERIOR WALL: 1.04 CM (ref 0.6–1.1)
FRACTIONAL SHORTENING: 23 % (ref 28–44)
INTERVENTRICULAR SEPTUM: 0.99 CM (ref 0.6–1.1)
LA MAJOR: 5.38 CM
LA MINOR: 5.26 CM
LA WIDTH: 4.68 CM
LEFT ATRIUM SIZE: 4.12 CM
LEFT ATRIUM VOLUME INDEX: 34 ML/M2
LEFT ATRIUM VOLUME: 87.18 CM3
LEFT INTERNAL DIMENSION IN SYSTOLE: 4.97 CM (ref 2.1–4)
LEFT VENTRICLE DIASTOLIC VOLUME INDEX: 82.76 ML/M2
LEFT VENTRICLE DIASTOLIC VOLUME: 211.98 ML
LEFT VENTRICLE MASS INDEX: 111 G/M2
LEFT VENTRICLE SYSTOLIC VOLUME INDEX: 45.5 ML/M2
LEFT VENTRICLE SYSTOLIC VOLUME: 116.51 ML
LEFT VENTRICULAR INTERNAL DIMENSION IN DIASTOLE: 6.45 CM (ref 3.5–6)
LEFT VENTRICULAR MASS: 284.7 G
LV LATERAL E/E' RATIO: 21.8 M/S
LV SEPTAL E/E' RATIO: 27.25 M/S
MV PEAK A VEL: 0.61 M/S
MV PEAK E VEL: 1.09 M/S
NUC REST DIASTOLIC VOLUME INDEX: 241
NUC REST EJECTION FRACTION: 29
NUC REST SYSTOLIC VOLUME INDEX: 171
OHS CV CPX 1 MINUTE RECOVERY HEART RATE: 89 BPM
OHS CV CPX 85 PERCENT MAX PREDICTED HEART RATE MALE: 143
OHS CV CPX MAX PREDICTED HEART RATE: 168
OHS CV CPX PATIENT IS FEMALE: 0
OHS CV CPX PATIENT IS MALE: 1
OHS CV CPX PEAK DIASTOLIC BLOOD PRESSURE: 109 MMHG
OHS CV CPX PEAK HEAR RATE: 89 BPM
OHS CV CPX PEAK RATE PRESSURE PRODUCT: NORMAL
OHS CV CPX PEAK SYSTOLIC BLOOD PRESSURE: 196 MMHG
OHS CV CPX PERCENT MAX PREDICTED HEART RATE ACHIEVED: 53
OHS CV CPX RATE PRESSURE PRODUCT PRESENTING: NORMAL
PISA TR MAX VEL: 2.8 M/S
PULM VEIN S/D RATIO: 1
PV PEAK D VEL: 0.39 M/S
PV PEAK S VEL: 0.39 M/S
RA MAJOR: 4.95 CM
RA PRESSURE: 3 MMHG
RA WIDTH: 4.3 CM
RV TISSUE DOPPLER FREE WALL SYSTOLIC VELOCITY 1 (APICAL 4 CHAMBER VIEW): 11.33 CM/S
SINUS: 3.22 CM
STJ: 2.63 CM
STRESS ECHO TARGET HR: 142.8 BPM
SYSTOLIC BLOOD PRESSURE: 184 MMHG
TDI LATERAL: 0.05 M/S
TDI SEPTAL: 0.04 M/S
TDI: 0.05 M/S
TR MAX PG: 31 MMHG
TRICUSPID ANNULAR PLANE SYSTOLIC EXCURSION: 2.55 CM
TV REST PULMONARY ARTERY PRESSURE: 34 MMHG

## 2019-08-02 ENCOUNTER — TELEPHONE (OUTPATIENT)
Dept: CARDIOLOGY | Facility: CLINIC | Age: 54
End: 2019-08-02

## 2019-08-02 DIAGNOSIS — R94.39 ABNORMAL NUCLEAR STRESS TEST: Primary | ICD-10-CM

## 2019-08-02 DIAGNOSIS — R07.9 CHEST PAIN, UNSPECIFIED TYPE: ICD-10-CM

## 2019-08-02 RX ORDER — AMLODIPINE BESYLATE 10 MG/1
10 TABLET ORAL DAILY
Qty: 30 TABLET | Refills: 11 | Status: SHIPPED | OUTPATIENT
Start: 2019-08-02 | End: 2020-07-06

## 2019-08-02 RX ORDER — NITROGLYCERIN 0.4 MG/1
0.4 TABLET SUBLINGUAL EVERY 5 MIN PRN
Qty: 25 TABLET | Refills: 2 | Status: SHIPPED | OUTPATIENT
Start: 2019-08-02 | End: 2023-01-26

## 2019-08-02 NOTE — PROGRESS NOTES
Called patient with test results.  His SPECT stress was positive for ischemia and had reduced EF    There is a small sized, mild intensity, reversible defect in the basal anterior and anteroseptal wall(s).    Gated perfusion images showed an ejection fraction of 29.0 % at rest and  % post stress. Normal is % - %.    There is moderate global hypokinesis at rest.    LV cavity size is severely enlarged at rest.    The EKG portion of this study is negative for ischemia.    The patient reported no chest pain during the stress test.    Echo was also abnormal with a significantly reduced EF at 30%  · Moderate to severly decreased left ventricular systolic function. The estimated ejection fraction is 30%  · Moderate left ventricular enlargement.  · Grade I (mild) left ventricular diastolic dysfunction consistent with impaired relaxation.  · Mildly to moderately reduced right ventricular systolic function.  · Mild left atrial enlargement.  · Mild mitral regurgitation.  · Mild tricuspid regurgitation.  · Normal central venous pressure (3 mm Hg).  · The estimated PA systolic pressure is 34 mm Hg    I recommended he have coronary angiogram  Discussed risks and benefits of cath and coronary angiogram. Pt understands, all questions answered and he agrees to proceed with cath.  Will schedule at [facility] next available

## 2019-08-02 NOTE — TELEPHONE ENCOUNTER
Spoke to pt; advised of date and time of angiogram, [facility] will call with arrival time; pt to be NPO after light breakfast; pt can take all medications as usual; pt will need someone to drive him home; pt expressed understanding of all instructions

## 2019-08-02 NOTE — TELEPHONE ENCOUNTER
Left heart cath scheduled with Dr. Clement per Dr. Hurt for august 9th at 1pm. Instructions left on machine. Patient aware he spoke with Dr. Hurt.

## 2019-08-02 NOTE — TELEPHONE ENCOUNTER
----- Message from Estrella Buitrago sent at 8/2/2019  4:42 PM CDT -----  Contact: patient  Type:  Patient Returning Call    Who Called:  patient  Who Left Message for Patient:  Natalie  Does the patient know what this is regarding?:    Best Call Back Number:  [phone]  Additional Information:

## 2019-08-05 ENCOUNTER — PATIENT MESSAGE (OUTPATIENT)
Dept: CARDIOLOGY | Facility: CLINIC | Age: 54
End: 2019-08-05

## 2019-08-09 PROBLEM — I25.10 CAD IN NATIVE ARTERY: Status: ACTIVE | Noted: 2019-08-09

## 2019-08-09 PROBLEM — R94.39 ABNORMAL NUCLEAR STRESS TEST: Status: ACTIVE | Noted: 2019-08-09

## 2019-08-26 ENCOUNTER — OFFICE VISIT (OUTPATIENT)
Dept: CARDIOLOGY | Facility: CLINIC | Age: 54
End: 2019-08-26
Payer: COMMERCIAL

## 2019-08-26 VITALS
DIASTOLIC BLOOD PRESSURE: 88 MMHG | BODY MASS INDEX: 44.1 KG/M2 | WEIGHT: 315 LBS | HEIGHT: 71 IN | HEART RATE: 78 BPM | SYSTOLIC BLOOD PRESSURE: 150 MMHG

## 2019-08-26 DIAGNOSIS — Z95.5 S/P CORONARY ARTERY STENT PLACEMENT: ICD-10-CM

## 2019-08-26 DIAGNOSIS — Z82.49 FAMILY HISTORY OF PREMATURE CAD: ICD-10-CM

## 2019-08-26 DIAGNOSIS — I10 ESSENTIAL HYPERTENSION: ICD-10-CM

## 2019-08-26 DIAGNOSIS — E66.01 MORBID OBESITY: ICD-10-CM

## 2019-08-26 DIAGNOSIS — E78.2 HYPERLIPIDEMIA, MIXED: ICD-10-CM

## 2019-08-26 DIAGNOSIS — I25.10 CAD IN NATIVE ARTERY: Primary | ICD-10-CM

## 2019-08-26 PROCEDURE — 3008F BODY MASS INDEX DOCD: CPT | Mod: CPTII,S$GLB,, | Performed by: INTERNAL MEDICINE

## 2019-08-26 PROCEDURE — 3077F PR MOST RECENT SYSTOLIC BLOOD PRESSURE >= 140 MM HG: ICD-10-PCS | Mod: CPTII,S$GLB,, | Performed by: INTERNAL MEDICINE

## 2019-08-26 PROCEDURE — 3077F SYST BP >= 140 MM HG: CPT | Mod: CPTII,S$GLB,, | Performed by: INTERNAL MEDICINE

## 2019-08-26 PROCEDURE — 99214 OFFICE O/P EST MOD 30 MIN: CPT | Mod: S$GLB,,, | Performed by: INTERNAL MEDICINE

## 2019-08-26 PROCEDURE — 99999 PR PBB SHADOW E&M-EST. PATIENT-LVL III: CPT | Mod: PBBFAC,,, | Performed by: INTERNAL MEDICINE

## 2019-08-26 PROCEDURE — 3079F DIAST BP 80-89 MM HG: CPT | Mod: CPTII,S$GLB,, | Performed by: INTERNAL MEDICINE

## 2019-08-26 PROCEDURE — 3008F PR BODY MASS INDEX (BMI) DOCUMENTED: ICD-10-PCS | Mod: CPTII,S$GLB,, | Performed by: INTERNAL MEDICINE

## 2019-08-26 PROCEDURE — 99214 PR OFFICE/OUTPT VISIT, EST, LEVL IV, 30-39 MIN: ICD-10-PCS | Mod: S$GLB,,, | Performed by: INTERNAL MEDICINE

## 2019-08-26 PROCEDURE — 3079F PR MOST RECENT DIASTOLIC BLOOD PRESSURE 80-89 MM HG: ICD-10-PCS | Mod: CPTII,S$GLB,, | Performed by: INTERNAL MEDICINE

## 2019-08-26 PROCEDURE — 99999 PR PBB SHADOW E&M-EST. PATIENT-LVL III: ICD-10-PCS | Mod: PBBFAC,,, | Performed by: INTERNAL MEDICINE

## 2019-08-26 NOTE — PROGRESS NOTES
Subjective:    Patient ID:  Be Shipman is a 54 y.o. male who presents for follow-up of Coronary Artery Disease (follow up after stent); Hypertension; and Hyperlipidemia      Pt here for f/u post PCI. He had severe NICKERSON and exertional chest tightness with an abnormal SPECT stress showing anterior ischemia. Cath showed HG LAD stenosis and 50-60% RCA. He had stent placed in LAD. He reports symptoms markedly improved. We also restarted previous meds and added some for BP and lipids. He monitors BP and has been much improved. He says he plans on increasing his exercise to lose weight.       Review of Systems   Constitution: Negative for weight gain and weight loss.   HENT: Negative.    Eyes: Negative.    Cardiovascular: Negative for chest pain, claudication, cyanosis, dyspnea on exertion, irregular heartbeat, leg swelling, near-syncope, orthopnea (no PND), palpitations and syncope.   Respiratory: Positive for shortness of breath. Negative for cough, hemoptysis and snoring.    Endocrine: Negative.    Skin: Negative.    Musculoskeletal: Negative for joint pain, muscle cramps, muscle weakness and myalgias.   Gastrointestinal: Negative for diarrhea, hematemesis, nausea and vomiting.   Genitourinary: Negative.    Neurological: Negative for dizziness, focal weakness, light-headedness, loss of balance, numbness, paresthesias and seizures.   Psychiatric/Behavioral: Negative.         Objective:    Physical Exam   Constitutional: He is oriented to person, place, and time. He appears well-developed and well-nourished.   HENT:   Mouth/Throat: Oropharynx is clear and moist.   Eyes: Pupils are equal, round, and reactive to light.   Neck: Normal range of motion. No thyromegaly present.   Cardiovascular: Normal rate, regular rhythm, S1 normal, S2 normal, normal heart sounds, intact distal pulses and normal pulses.  No extrasystoles are present. PMI is not displaced. Exam reveals no friction rub.   No murmur heard.  Pulmonary/Chest:  Effort normal and breath sounds normal. He has no wheezes. He has no rales. He exhibits no tenderness.   Abdominal: Soft. Bowel sounds are normal. He exhibits no distension and no mass. There is no tenderness.   Musculoskeletal: Normal range of motion. He exhibits no edema.   Neurological: He is alert and oriented to person, place, and time.   Skin: Skin is warm and dry.   Vitals reviewed.        Assessment:       1. CAD in native artery    2. Essential hypertension    3. Hyperlipidemia, mixed    4. Morbid obesity    5. Family history of premature CAD    6. S/P coronary artery stent placement - LAD 08/09/2019         Plan:       We discussed his angiogram and LAD stent placement  We discussed the need to be compliant with meds and f/u going forward  We discussed cardiac rehab but his work schedule will make that difficult - he plans to ride his bicycle for exercise  Will add low dose asa   F/u in 3 months with Echo, lipids and CMP

## 2019-10-07 DIAGNOSIS — Z95.5 S/P CORONARY ARTERY STENT PLACEMENT: Primary | ICD-10-CM

## 2019-10-09 RX ORDER — CLOPIDOGREL BISULFATE 75 MG/1
75 TABLET ORAL DAILY
Qty: 90 TABLET | Refills: 3 | Status: SHIPPED | OUTPATIENT
Start: 2019-10-09 | End: 2020-12-14

## 2019-10-15 DIAGNOSIS — J01.41 ACUTE RECURRENT PANSINUSITIS: ICD-10-CM

## 2019-10-15 RX ORDER — FLUTICASONE PROPIONATE 50 MCG
1 SPRAY, SUSPENSION (ML) NASAL DAILY
Qty: 1 BOTTLE | Refills: 2 | Status: SHIPPED | OUTPATIENT
Start: 2019-10-15 | End: 2020-01-13 | Stop reason: SDUPTHER

## 2019-12-02 ENCOUNTER — CLINICAL SUPPORT (OUTPATIENT)
Dept: CARDIOLOGY | Facility: CLINIC | Age: 54
End: 2019-12-02
Attending: INTERNAL MEDICINE
Payer: COMMERCIAL

## 2019-12-02 VITALS
HEIGHT: 71 IN | DIASTOLIC BLOOD PRESSURE: 98 MMHG | SYSTOLIC BLOOD PRESSURE: 144 MMHG | BODY MASS INDEX: 44.1 KG/M2 | HEART RATE: 71 BPM | WEIGHT: 315 LBS

## 2019-12-02 DIAGNOSIS — Z95.5 S/P CORONARY ARTERY STENT PLACEMENT: ICD-10-CM

## 2019-12-02 DIAGNOSIS — E66.01 MORBID OBESITY: ICD-10-CM

## 2019-12-02 DIAGNOSIS — Z82.49 FAMILY HISTORY OF PREMATURE CAD: ICD-10-CM

## 2019-12-02 DIAGNOSIS — I25.10 CAD IN NATIVE ARTERY: ICD-10-CM

## 2019-12-02 DIAGNOSIS — I10 ESSENTIAL HYPERTENSION: ICD-10-CM

## 2019-12-02 DIAGNOSIS — E78.2 HYPERLIPIDEMIA, MIXED: ICD-10-CM

## 2019-12-02 PROCEDURE — 93306 TRANSTHORACIC ECHO (TTE) COMPLETE (CUPID ONLY): ICD-10-PCS | Mod: S$GLB,,, | Performed by: INTERNAL MEDICINE

## 2019-12-02 PROCEDURE — 99999 PR PBB SHADOW E&M-EST. PATIENT-LVL II: ICD-10-PCS | Mod: PBBFAC,,,

## 2019-12-02 PROCEDURE — 99999 PR PBB SHADOW E&M-EST. PATIENT-LVL II: CPT | Mod: PBBFAC,,,

## 2019-12-02 PROCEDURE — 93306 TTE W/DOPPLER COMPLETE: CPT | Mod: S$GLB,,, | Performed by: INTERNAL MEDICINE

## 2019-12-03 LAB
ASCENDING AORTA: 2.87 CM
AV INDEX (PROSTH): 0.62
AV MEAN GRADIENT: 8 MMHG
AV PEAK GRADIENT: 15 MMHG
AV VALVE AREA: 1.96 CM2
AV VELOCITY RATIO: 0.52
BSA FOR ECHO PROCEDURE: 2.74 M2
CV ECHO LV RWT: 0.49 CM
DOP CALC AO PEAK VEL: 1.91 M/S
DOP CALC AO VTI: 38.81 CM
DOP CALC LVOT AREA: 3.1 CM2
DOP CALC LVOT DIAMETER: 2 CM
DOP CALC LVOT PEAK VEL: 0.99 M/S
DOP CALC LVOT STROKE VOLUME: 75.96 CM3
DOP CALCLVOT PEAK VEL VTI: 24.19 CM
E WAVE DECELERATION TIME: 206.26 MSEC
E/A RATIO: 1.07
ECHO LV POSTERIOR WALL: 1.24 CM (ref 0.6–1.1)
FRACTIONAL SHORTENING: 28 % (ref 28–44)
INTERVENTRICULAR SEPTUM: 1.07 CM (ref 0.6–1.1)
IVRT: 0.12 MSEC
LA MAJOR: 5.97 CM
LA MINOR: 6.35 CM
LA WIDTH: 4.52 CM
LEFT ATRIUM SIZE: 3.89 CM
LEFT ATRIUM VOLUME INDEX: 35.3 ML/M2
LEFT ATRIUM VOLUME: 91.98 CM3
LEFT INTERNAL DIMENSION IN SYSTOLE: 3.64 CM (ref 2.1–4)
LEFT VENTRICLE DIASTOLIC VOLUME INDEX: 46.01 ML/M2
LEFT VENTRICLE DIASTOLIC VOLUME: 120.04 ML
LEFT VENTRICLE MASS INDEX: 86 G/M2
LEFT VENTRICLE SYSTOLIC VOLUME INDEX: 21.4 ML/M2
LEFT VENTRICLE SYSTOLIC VOLUME: 55.92 ML
LEFT VENTRICULAR INTERNAL DIMENSION IN DIASTOLE: 5.03 CM (ref 3.5–6)
LEFT VENTRICULAR MASS: 223.73 G
MV PEAK A VEL: 1.07 M/S
MV PEAK E VEL: 1.14 M/S
PISA TR MAX VEL: 2.9 M/S
PULM VEIN S/D RATIO: 1
PV PEAK D VEL: 0.58 M/S
PV PEAK S VEL: 0.58 M/S
RA MAJOR: 5.42 CM
RA WIDTH: 3.55 CM
RIGHT VENTRICULAR END-DIASTOLIC DIMENSION: 4.43 CM
RV TISSUE DOPPLER FREE WALL SYSTOLIC VELOCITY 1 (APICAL 4 CHAMBER VIEW): 8.88 CM/S
SINUS: 3.04 CM
STJ: 2.46 CM
TR MAX PG: 34 MMHG
TRICUSPID ANNULAR PLANE SYSTOLIC EXCURSION: 2.24 CM

## 2019-12-05 ENCOUNTER — OFFICE VISIT (OUTPATIENT)
Dept: CARDIOLOGY | Facility: CLINIC | Age: 54
End: 2019-12-05
Payer: COMMERCIAL

## 2019-12-05 VITALS
HEIGHT: 71 IN | BODY MASS INDEX: 44.1 KG/M2 | SYSTOLIC BLOOD PRESSURE: 138 MMHG | DIASTOLIC BLOOD PRESSURE: 88 MMHG | WEIGHT: 315 LBS | HEART RATE: 70 BPM

## 2019-12-05 DIAGNOSIS — E78.2 HYPERLIPIDEMIA, MIXED: ICD-10-CM

## 2019-12-05 DIAGNOSIS — E66.01 MORBID OBESITY: ICD-10-CM

## 2019-12-05 DIAGNOSIS — I25.10 CAD IN NATIVE ARTERY: Primary | ICD-10-CM

## 2019-12-05 DIAGNOSIS — Z95.5 S/P CORONARY ARTERY STENT PLACEMENT: ICD-10-CM

## 2019-12-05 DIAGNOSIS — Z82.49 FAMILY HISTORY OF PREMATURE CAD: ICD-10-CM

## 2019-12-05 DIAGNOSIS — I10 ESSENTIAL HYPERTENSION: ICD-10-CM

## 2019-12-05 PROCEDURE — 99999 PR PBB SHADOW E&M-EST. PATIENT-LVL III: CPT | Mod: PBBFAC,,, | Performed by: INTERNAL MEDICINE

## 2019-12-05 PROCEDURE — 3008F BODY MASS INDEX DOCD: CPT | Mod: CPTII,S$GLB,, | Performed by: INTERNAL MEDICINE

## 2019-12-05 PROCEDURE — 3008F PR BODY MASS INDEX (BMI) DOCUMENTED: ICD-10-PCS | Mod: CPTII,S$GLB,, | Performed by: INTERNAL MEDICINE

## 2019-12-05 PROCEDURE — 3075F SYST BP GE 130 - 139MM HG: CPT | Mod: CPTII,S$GLB,, | Performed by: INTERNAL MEDICINE

## 2019-12-05 PROCEDURE — 99214 PR OFFICE/OUTPT VISIT, EST, LEVL IV, 30-39 MIN: ICD-10-PCS | Mod: S$GLB,,, | Performed by: INTERNAL MEDICINE

## 2019-12-05 PROCEDURE — 3079F DIAST BP 80-89 MM HG: CPT | Mod: CPTII,S$GLB,, | Performed by: INTERNAL MEDICINE

## 2019-12-05 PROCEDURE — 99999 PR PBB SHADOW E&M-EST. PATIENT-LVL III: ICD-10-PCS | Mod: PBBFAC,,, | Performed by: INTERNAL MEDICINE

## 2019-12-05 PROCEDURE — 3079F PR MOST RECENT DIASTOLIC BLOOD PRESSURE 80-89 MM HG: ICD-10-PCS | Mod: CPTII,S$GLB,, | Performed by: INTERNAL MEDICINE

## 2019-12-05 PROCEDURE — 3075F PR MOST RECENT SYSTOLIC BLOOD PRESS GE 130-139MM HG: ICD-10-PCS | Mod: CPTII,S$GLB,, | Performed by: INTERNAL MEDICINE

## 2019-12-05 PROCEDURE — 99214 OFFICE O/P EST MOD 30 MIN: CPT | Mod: S$GLB,,, | Performed by: INTERNAL MEDICINE

## 2019-12-05 RX ORDER — FUROSEMIDE 20 MG/1
20 TABLET ORAL DAILY
Qty: 90 TABLET | Refills: 3 | Status: SHIPPED | OUTPATIENT
Start: 2019-12-05 | End: 2020-12-01

## 2019-12-05 NOTE — PROGRESS NOTES
Subjective:    Patient ID:  Be Shipman is a 54 y.o. male who presents for follow-up of Hyperlipidemia (follow up with echo- edema BLE); Hypertension; and Coronary Artery Disease      Pt here for f/u. He had EF 30% prior to LAD stent. Repeat Echo shows EF has normalized. His only complaint today is weight gain and LE swelling.       Review of Systems   Constitution: Positive for weight gain. Negative for weight loss.   HENT: Negative.    Eyes: Negative.    Cardiovascular: Positive for leg swelling. Negative for chest pain, claudication, cyanosis, dyspnea on exertion, irregular heartbeat, near-syncope, orthopnea (no PND), palpitations and syncope.   Respiratory: Negative for cough, hemoptysis, shortness of breath and snoring.    Endocrine: Negative.    Skin: Negative.    Musculoskeletal: Negative for joint pain, muscle cramps, muscle weakness and myalgias.   Gastrointestinal: Negative for diarrhea, hematemesis, nausea and vomiting.   Genitourinary: Negative.    Neurological: Negative for dizziness, focal weakness, light-headedness, loss of balance, numbness, paresthesias and seizures.   Psychiatric/Behavioral: Negative.         Objective:    Physical Exam   Constitutional: He is oriented to person, place, and time. He appears well-developed and well-nourished.   HENT:   Mouth/Throat: Oropharynx is clear and moist.   Eyes: Pupils are equal, round, and reactive to light.   Neck: Normal range of motion. No thyromegaly present.   Cardiovascular: Normal rate, regular rhythm, S1 normal, S2 normal, normal heart sounds, intact distal pulses and normal pulses.  No extrasystoles are present. PMI is not displaced. Exam reveals no friction rub.   No murmur heard.  Pulmonary/Chest: Effort normal and breath sounds normal. He has no wheezes. He has no rales. He exhibits no tenderness.   Abdominal: Soft. Bowel sounds are normal. He exhibits no distension and no mass. There is no tenderness.   Musculoskeletal: Normal range of  motion. He exhibits edema (2+).   Neurological: He is alert and oriented to person, place, and time.   Skin: Skin is warm and dry.   Vitals reviewed.        Assessment:       1. CAD in native artery    2. S/P coronary artery stent placement - LAD 08/09/2019    3. Essential hypertension    4. Hyperlipidemia, mixed    5. Morbid obesity    6. Family history of premature CAD         Plan:       We discussed his Echo and that EF has improved   We discussed his weight gain  Will add furosemide 20 mg daily  We discussed low sodium diet   Check lipids, CMP  F/u 6 months

## 2020-01-13 DIAGNOSIS — J01.41 ACUTE RECURRENT PANSINUSITIS: ICD-10-CM

## 2020-01-14 RX ORDER — FLUTICASONE PROPIONATE 50 MCG
1 SPRAY, SUSPENSION (ML) NASAL DAILY
Qty: 1 BOTTLE | Refills: 2 | Status: SHIPPED | OUTPATIENT
Start: 2020-01-14 | End: 2021-09-11

## 2020-02-08 ENCOUNTER — LAB VISIT (OUTPATIENT)
Dept: LAB | Facility: HOSPITAL | Age: 55
End: 2020-02-08
Attending: PODIATRIST
Payer: COMMERCIAL

## 2020-02-08 DIAGNOSIS — M10.9 GOUT, UNSPECIFIED: Primary | ICD-10-CM

## 2020-02-08 DIAGNOSIS — G60.9 IDIOPATHIC PERIPHERAL NEUROPATHY: ICD-10-CM

## 2020-02-08 LAB
ALBUMIN SERPL BCP-MCNC: 3.5 G/DL (ref 3.5–5.2)
ALP SERPL-CCNC: 90 U/L (ref 55–135)
ALT SERPL W/O P-5'-P-CCNC: 32 U/L (ref 10–44)
ANION GAP SERPL CALC-SCNC: 12 MMOL/L (ref 8–16)
AST SERPL-CCNC: 27 U/L (ref 10–40)
BASOPHILS # BLD AUTO: 0.06 K/UL (ref 0–0.2)
BASOPHILS NFR BLD: 0.6 % (ref 0–1.9)
BILIRUB SERPL-MCNC: 0.3 MG/DL (ref 0.1–1)
BUN SERPL-MCNC: 23 MG/DL (ref 6–20)
CALCIUM SERPL-MCNC: 9.6 MG/DL (ref 8.7–10.5)
CHLORIDE SERPL-SCNC: 102 MMOL/L (ref 95–110)
CO2 SERPL-SCNC: 28 MMOL/L (ref 23–29)
CREAT SERPL-MCNC: 1 MG/DL (ref 0.5–1.4)
CRP SERPL-MCNC: 4.7 MG/L (ref 0–8.2)
DIFFERENTIAL METHOD: ABNORMAL
EOSINOPHIL # BLD AUTO: 0.1 K/UL (ref 0–0.5)
EOSINOPHIL NFR BLD: 0.8 % (ref 0–8)
ERYTHROCYTE [DISTWIDTH] IN BLOOD BY AUTOMATED COUNT: 12.7 % (ref 11.5–14.5)
ERYTHROCYTE [SEDIMENTATION RATE] IN BLOOD BY WESTERGREN METHOD: 9 MM/HR (ref 0–10)
EST. GFR  (AFRICAN AMERICAN): >60 ML/MIN/1.73 M^2
EST. GFR  (NON AFRICAN AMERICAN): >60 ML/MIN/1.73 M^2
ESTIMATED AVG GLUCOSE: 143 MG/DL (ref 68–131)
FOLATE SERPL-MCNC: 6.7 NG/ML (ref 4–24)
GLUCOSE SERPL-MCNC: 201 MG/DL (ref 70–110)
HBA1C MFR BLD HPLC: 6.6 % (ref 4–5.6)
HCT VFR BLD AUTO: 42.5 % (ref 40–54)
HGB BLD-MCNC: 13.6 G/DL (ref 14–18)
IMM GRANULOCYTES # BLD AUTO: 0.1 K/UL (ref 0–0.04)
IMM GRANULOCYTES NFR BLD AUTO: 1 % (ref 0–0.5)
LYMPHOCYTES # BLD AUTO: 2.6 K/UL (ref 1–4.8)
LYMPHOCYTES NFR BLD: 27.2 % (ref 18–48)
MCH RBC QN AUTO: 31.3 PG (ref 27–31)
MCHC RBC AUTO-ENTMCNC: 32 G/DL (ref 32–36)
MCV RBC AUTO: 98 FL (ref 82–98)
MONOCYTES # BLD AUTO: 0.5 K/UL (ref 0.3–1)
MONOCYTES NFR BLD: 5.5 % (ref 4–15)
NEUTROPHILS # BLD AUTO: 6.3 K/UL (ref 1.8–7.7)
NEUTROPHILS NFR BLD: 64.9 % (ref 38–73)
NRBC BLD-RTO: 0 /100 WBC
PLATELET # BLD AUTO: 245 K/UL (ref 150–350)
PMV BLD AUTO: 10.8 FL (ref 9.2–12.9)
POTASSIUM SERPL-SCNC: 3.4 MMOL/L (ref 3.5–5.1)
PROT SERPL-MCNC: 6.8 G/DL (ref 6–8.4)
RBC # BLD AUTO: 4.35 M/UL (ref 4.6–6.2)
SODIUM SERPL-SCNC: 142 MMOL/L (ref 136–145)
TSH SERPL DL<=0.005 MIU/L-ACNC: 0.91 UIU/ML (ref 0.4–4)
URATE SERPL-MCNC: 9.9 MG/DL (ref 3.4–7)
VIT B12 SERPL-MCNC: 344 PG/ML (ref 210–950)
WBC # BLD AUTO: 9.66 K/UL (ref 3.9–12.7)

## 2020-02-08 PROCEDURE — 82607 VITAMIN B-12: CPT

## 2020-02-08 PROCEDURE — 80053 COMPREHEN METABOLIC PANEL: CPT

## 2020-02-08 PROCEDURE — 83036 HEMOGLOBIN GLYCOSYLATED A1C: CPT

## 2020-02-08 PROCEDURE — 86431 RHEUMATOID FACTOR QUANT: CPT

## 2020-02-08 PROCEDURE — 84165 PATHOLOGIST INTERPRETATION SPE: ICD-10-PCS | Mod: 26,,, | Performed by: PATHOLOGY

## 2020-02-08 PROCEDURE — 84165 PROTEIN E-PHORESIS SERUM: CPT

## 2020-02-08 PROCEDURE — 84165 PROTEIN E-PHORESIS SERUM: CPT | Mod: 26,,, | Performed by: PATHOLOGY

## 2020-02-08 PROCEDURE — 36415 COLL VENOUS BLD VENIPUNCTURE: CPT | Mod: PO

## 2020-02-08 PROCEDURE — 85025 COMPLETE CBC W/AUTO DIFF WBC: CPT

## 2020-02-08 PROCEDURE — 84443 ASSAY THYROID STIM HORMONE: CPT

## 2020-02-08 PROCEDURE — 85651 RBC SED RATE NONAUTOMATED: CPT | Mod: PO

## 2020-02-08 PROCEDURE — 86038 ANTINUCLEAR ANTIBODIES: CPT

## 2020-02-08 PROCEDURE — 82746 ASSAY OF FOLIC ACID SERUM: CPT

## 2020-02-08 PROCEDURE — 86140 C-REACTIVE PROTEIN: CPT

## 2020-02-08 PROCEDURE — 84550 ASSAY OF BLOOD/URIC ACID: CPT

## 2020-02-10 LAB
ALBUMIN SERPL ELPH-MCNC: 3.71 G/DL (ref 3.35–5.55)
ALPHA1 GLOB SERPL ELPH-MCNC: 0.3 G/DL (ref 0.17–0.41)
ALPHA2 GLOB SERPL ELPH-MCNC: 0.84 G/DL (ref 0.43–0.99)
ANA SER QL IF: NORMAL
B-GLOBULIN SERPL ELPH-MCNC: 0.81 G/DL (ref 0.5–1.1)
GAMMA GLOB SERPL ELPH-MCNC: 0.74 G/DL (ref 0.67–1.58)
PATHOLOGIST INTERPRETATION SPE: NORMAL
PROT SERPL-MCNC: 6.4 G/DL (ref 6–8.4)
RHEUMATOID FACT SERPL-ACNC: <10 IU/ML (ref 0–15)

## 2020-06-05 ENCOUNTER — OFFICE VISIT (OUTPATIENT)
Dept: CARDIOLOGY | Facility: CLINIC | Age: 55
End: 2020-06-05
Payer: COMMERCIAL

## 2020-06-05 VITALS
HEIGHT: 71 IN | BODY MASS INDEX: 44.1 KG/M2 | SYSTOLIC BLOOD PRESSURE: 138 MMHG | HEART RATE: 75 BPM | DIASTOLIC BLOOD PRESSURE: 92 MMHG | WEIGHT: 315 LBS

## 2020-06-05 DIAGNOSIS — Z82.49 FAMILY HISTORY OF PREMATURE CAD: ICD-10-CM

## 2020-06-05 DIAGNOSIS — E78.2 HYPERLIPIDEMIA, MIXED: ICD-10-CM

## 2020-06-05 DIAGNOSIS — E66.01 MORBID OBESITY: ICD-10-CM

## 2020-06-05 DIAGNOSIS — I25.10 CAD IN NATIVE ARTERY: Primary | ICD-10-CM

## 2020-06-05 DIAGNOSIS — R06.02 SOB (SHORTNESS OF BREATH): ICD-10-CM

## 2020-06-05 DIAGNOSIS — Z95.5 S/P CORONARY ARTERY STENT PLACEMENT: ICD-10-CM

## 2020-06-05 DIAGNOSIS — I10 ESSENTIAL HYPERTENSION: ICD-10-CM

## 2020-06-05 PROCEDURE — 3080F PR MOST RECENT DIASTOLIC BLOOD PRESSURE >= 90 MM HG: ICD-10-PCS | Mod: CPTII,S$GLB,, | Performed by: INTERNAL MEDICINE

## 2020-06-05 PROCEDURE — 99214 PR OFFICE/OUTPT VISIT, EST, LEVL IV, 30-39 MIN: ICD-10-PCS | Mod: S$GLB,,, | Performed by: INTERNAL MEDICINE

## 2020-06-05 PROCEDURE — 99214 OFFICE O/P EST MOD 30 MIN: CPT | Mod: S$GLB,,, | Performed by: INTERNAL MEDICINE

## 2020-06-05 PROCEDURE — 3080F DIAST BP >= 90 MM HG: CPT | Mod: CPTII,S$GLB,, | Performed by: INTERNAL MEDICINE

## 2020-06-05 PROCEDURE — 3075F SYST BP GE 130 - 139MM HG: CPT | Mod: CPTII,S$GLB,, | Performed by: INTERNAL MEDICINE

## 2020-06-05 PROCEDURE — 3008F PR BODY MASS INDEX (BMI) DOCUMENTED: ICD-10-PCS | Mod: CPTII,S$GLB,, | Performed by: INTERNAL MEDICINE

## 2020-06-05 PROCEDURE — 3008F BODY MASS INDEX DOCD: CPT | Mod: CPTII,S$GLB,, | Performed by: INTERNAL MEDICINE

## 2020-06-05 PROCEDURE — 3075F PR MOST RECENT SYSTOLIC BLOOD PRESS GE 130-139MM HG: ICD-10-PCS | Mod: CPTII,S$GLB,, | Performed by: INTERNAL MEDICINE

## 2020-06-05 PROCEDURE — 99999 PR PBB SHADOW E&M-EST. PATIENT-LVL III: CPT | Mod: PBBFAC,,, | Performed by: INTERNAL MEDICINE

## 2020-06-05 PROCEDURE — 99999 PR PBB SHADOW E&M-EST. PATIENT-LVL III: ICD-10-PCS | Mod: PBBFAC,,, | Performed by: INTERNAL MEDICINE

## 2020-06-05 NOTE — PROGRESS NOTES
Subjective:    Patient ID:  Be Shipman is a 54 y.o. male who presents for follow-up of Coronary Artery Disease (follow up); Hypertension; and Hyperlipidemia      Pt here for f/u. He reports doing well with no return of previous exertional symptoms.       Review of Systems   Constitution: Positive for weight gain. Negative for weight loss.   HENT: Negative.    Eyes: Negative.    Cardiovascular: Positive for leg swelling. Negative for chest pain, claudication, cyanosis, dyspnea on exertion, irregular heartbeat, near-syncope, orthopnea (no PND), palpitations and syncope.   Respiratory: Negative for cough, hemoptysis, shortness of breath and snoring.    Endocrine: Negative.    Skin: Negative.    Musculoskeletal: Negative for joint pain, muscle cramps, muscle weakness and myalgias.   Gastrointestinal: Negative for diarrhea, hematemesis, nausea and vomiting.   Genitourinary: Negative.    Neurological: Negative for dizziness, focal weakness, light-headedness, loss of balance, numbness, paresthesias and seizures.   Psychiatric/Behavioral: Negative.         Objective:    Physical Exam   Constitutional: He is oriented to person, place, and time. He appears well-developed and well-nourished.   HENT:   Mouth/Throat: Oropharynx is clear and moist.   Eyes: Pupils are equal, round, and reactive to light.   Neck: Normal range of motion. No thyromegaly present.   Cardiovascular: Normal rate, regular rhythm, S1 normal, S2 normal, normal heart sounds, intact distal pulses and normal pulses.  No extrasystoles are present. PMI is not displaced. Exam reveals no friction rub.   No murmur heard.  Pulmonary/Chest: Effort normal and breath sounds normal. He has no wheezes. He has no rales. He exhibits no tenderness.   Abdominal: Soft. Bowel sounds are normal. He exhibits no distension and no mass. There is no tenderness.   Musculoskeletal: Normal range of motion. He exhibits edema (2+).   Neurological: He is alert and oriented to  person, place, and time.   Skin: Skin is warm and dry.   Vitals reviewed.      Test(s) Reviewed  I have reviewed the following in detail:  [x] Stress test   [x] Angiography   [x] Echocardiogram   [x] Labs   [] Other:         Assessment:       1. CAD in native artery    2. S/P coronary artery stent placement - LAD 08/09/2019    3. Essential hypertension    4. Family history of premature CAD    5. SOB (shortness of breath)    6. Morbid obesity    7. Hyperlipidemia, mixed         Plan:       Pt doing well  Cardiac status stable  We discussed his weight gain and need to exercise  We discussed reducing sodium in the diet   Due for lipids and CMP  Continue present Rx  F/u 6 months

## 2020-07-16 DIAGNOSIS — Z91.89 RISK FOR CORONARY ARTERY DISEASE GREATER THAN 20% IN NEXT 10 YEARS PER FRAMINGHAM SCORE: ICD-10-CM

## 2020-07-16 DIAGNOSIS — E78.2 HYPERLIPIDEMIA, MIXED: ICD-10-CM

## 2020-07-16 RX ORDER — ATORVASTATIN CALCIUM 40 MG/1
40 TABLET, FILM COATED ORAL DAILY
Qty: 90 TABLET | Refills: 3 | Status: SHIPPED | OUTPATIENT
Start: 2020-07-16 | End: 2021-11-05 | Stop reason: SDUPTHER

## 2021-04-07 ENCOUNTER — OFFICE VISIT (OUTPATIENT)
Dept: FAMILY MEDICINE | Facility: CLINIC | Age: 56
End: 2021-04-07
Payer: COMMERCIAL

## 2021-04-07 VITALS
HEART RATE: 80 BPM | OXYGEN SATURATION: 97 % | TEMPERATURE: 98 F | WEIGHT: 315 LBS | SYSTOLIC BLOOD PRESSURE: 138 MMHG | HEIGHT: 71 IN | DIASTOLIC BLOOD PRESSURE: 84 MMHG | BODY MASS INDEX: 44.1 KG/M2

## 2021-04-07 DIAGNOSIS — E78.00 PURE HYPERCHOLESTEROLEMIA: ICD-10-CM

## 2021-04-07 DIAGNOSIS — Z11.59 NEED FOR HEPATITIS C SCREENING TEST: ICD-10-CM

## 2021-04-07 DIAGNOSIS — I10 ESSENTIAL HYPERTENSION: Primary | ICD-10-CM

## 2021-04-07 DIAGNOSIS — I25.10 CAD IN NATIVE ARTERY: ICD-10-CM

## 2021-04-07 DIAGNOSIS — M1A.0790 CHRONIC GOUT OF FOOT, UNSPECIFIED CAUSE, UNSPECIFIED LATERALITY: ICD-10-CM

## 2021-04-07 DIAGNOSIS — R60.9 EDEMA, UNSPECIFIED TYPE: ICD-10-CM

## 2021-04-07 PROCEDURE — 1125F PR PAIN SEVERITY QUANTIFIED, PAIN PRESENT: ICD-10-PCS | Mod: S$GLB,,, | Performed by: NURSE PRACTITIONER

## 2021-04-07 PROCEDURE — 3008F BODY MASS INDEX DOCD: CPT | Mod: S$GLB,,, | Performed by: NURSE PRACTITIONER

## 2021-04-07 PROCEDURE — 99204 PR OFFICE/OUTPT VISIT, NEW, LEVL IV, 45-59 MIN: ICD-10-PCS | Mod: S$GLB,,, | Performed by: NURSE PRACTITIONER

## 2021-04-07 PROCEDURE — 3075F SYST BP GE 130 - 139MM HG: CPT | Mod: S$GLB,,, | Performed by: NURSE PRACTITIONER

## 2021-04-07 PROCEDURE — 3075F PR MOST RECENT SYSTOLIC BLOOD PRESS GE 130-139MM HG: ICD-10-PCS | Mod: S$GLB,,, | Performed by: NURSE PRACTITIONER

## 2021-04-07 PROCEDURE — 99204 OFFICE O/P NEW MOD 45 MIN: CPT | Mod: S$GLB,,, | Performed by: NURSE PRACTITIONER

## 2021-04-07 PROCEDURE — 3079F PR MOST RECENT DIASTOLIC BLOOD PRESSURE 80-89 MM HG: ICD-10-PCS | Mod: S$GLB,,, | Performed by: NURSE PRACTITIONER

## 2021-04-07 PROCEDURE — 3079F DIAST BP 80-89 MM HG: CPT | Mod: S$GLB,,, | Performed by: NURSE PRACTITIONER

## 2021-04-07 PROCEDURE — 3008F PR BODY MASS INDEX (BMI) DOCUMENTED: ICD-10-PCS | Mod: S$GLB,,, | Performed by: NURSE PRACTITIONER

## 2021-04-07 PROCEDURE — 1125F AMNT PAIN NOTED PAIN PRSNT: CPT | Mod: S$GLB,,, | Performed by: NURSE PRACTITIONER

## 2021-04-09 DIAGNOSIS — I25.118 CORONARY ARTERY DISEASE INVOLVING NATIVE CORONARY ARTERY OF NATIVE HEART WITH OTHER FORM OF ANGINA PECTORIS: Primary | ICD-10-CM

## 2021-04-13 ENCOUNTER — TELEPHONE (OUTPATIENT)
Dept: FAMILY MEDICINE | Facility: CLINIC | Age: 56
End: 2021-04-13

## 2021-04-13 LAB
ALBUMIN SERPL-MCNC: 4.4 G/DL (ref 3.8–4.9)
ALBUMIN/GLOB SERPL: 2.2 {RATIO} (ref 1.2–2.2)
ALP SERPL-CCNC: 110 IU/L (ref 39–117)
ALT SERPL-CCNC: 43 IU/L (ref 0–44)
APPEARANCE UR: CLEAR
AST SERPL-CCNC: 50 IU/L (ref 0–40)
BASOPHILS # BLD AUTO: 0.1 X10E3/UL (ref 0–0.2)
BASOPHILS NFR BLD AUTO: 1 %
BILIRUB SERPL-MCNC: 0.2 MG/DL (ref 0–1.2)
BILIRUB UR QL STRIP: NEGATIVE
BUN SERPL-MCNC: 11 MG/DL (ref 6–24)
BUN/CREAT SERPL: 13 (ref 9–20)
CALCIUM SERPL-MCNC: 10.1 MG/DL (ref 8.7–10.2)
CHLORIDE SERPL-SCNC: 100 MMOL/L (ref 96–106)
CHOLEST SERPL-MCNC: 186 MG/DL (ref 100–199)
CO2 SERPL-SCNC: 27 MMOL/L (ref 20–29)
COLOR UR: YELLOW
CREAT SERPL-MCNC: 0.83 MG/DL (ref 0.76–1.27)
EOSINOPHIL # BLD AUTO: 0.2 X10E3/UL (ref 0–0.4)
EOSINOPHIL NFR BLD AUTO: 4 %
ERYTHROCYTE [DISTWIDTH] IN BLOOD BY AUTOMATED COUNT: 15.1 % (ref 11.6–15.4)
GLOBULIN SER CALC-MCNC: 2 G/DL (ref 1.5–4.5)
GLUCOSE SERPL-MCNC: 116 MG/DL (ref 65–99)
GLUCOSE UR QL: NEGATIVE
HCT VFR BLD AUTO: 44.5 % (ref 37.5–51)
HDLC SERPL-MCNC: 39 MG/DL
HGB BLD-MCNC: 13.9 G/DL (ref 13–17.7)
HGB UR QL STRIP: NEGATIVE
IMM GRANULOCYTES # BLD AUTO: 0.1 X10E3/UL (ref 0–0.1)
IMM GRANULOCYTES NFR BLD AUTO: 1 %
KETONES UR QL STRIP: NEGATIVE
LDLC SERPL CALC-MCNC: 111 MG/DL (ref 0–99)
LEUKOCYTE ESTERASE UR QL STRIP: NEGATIVE
LYMPHOCYTES # BLD AUTO: 2 X10E3/UL (ref 0.7–3.1)
LYMPHOCYTES NFR BLD AUTO: 33 %
MCH RBC QN AUTO: 29.8 PG (ref 26.6–33)
MCHC RBC AUTO-ENTMCNC: 31.2 G/DL (ref 31.5–35.7)
MCV RBC AUTO: 95 FL (ref 79–97)
MICRO URNS: NORMAL
MONOCYTES # BLD AUTO: 0.5 X10E3/UL (ref 0.1–0.9)
MONOCYTES NFR BLD AUTO: 8 %
NEUTROPHILS # BLD AUTO: 3.4 X10E3/UL (ref 1.4–7)
NEUTROPHILS NFR BLD AUTO: 53 %
NITRITE UR QL STRIP: NEGATIVE
PH UR STRIP: 6 [PH] (ref 5–7.5)
PLATELET # BLD AUTO: 267 X10E3/UL (ref 150–450)
POTASSIUM SERPL-SCNC: 4 MMOL/L (ref 3.5–5.2)
PROT SERPL-MCNC: 6.4 G/DL (ref 6–8.5)
PROT UR QL STRIP: NEGATIVE
RBC # BLD AUTO: 4.67 X10E6/UL (ref 4.14–5.8)
SODIUM SERPL-SCNC: 141 MMOL/L (ref 134–144)
SP GR UR: 1.02 (ref 1–1.03)
SPECIMEN STATUS REPORT: NORMAL
TRIGL SERPL-MCNC: 208 MG/DL (ref 0–149)
TSH SERPL DL<=0.005 MIU/L-ACNC: 1.98 UIU/ML (ref 0.45–4.5)
URATE SERPL-MCNC: 9 MG/DL (ref 3.8–8.4)
UROBILINOGEN UR STRIP-MCNC: 0.2 MG/DL (ref 0.2–1)
VLDLC SERPL CALC-MCNC: 36 MG/DL (ref 5–40)
WBC # BLD AUTO: 6.3 X10E3/UL (ref 3.4–10.8)

## 2021-04-14 LAB
HBA1C MFR BLD: 6.5 % (ref 4.8–5.6)
HCV AB S/CO SERPL IA: <0.1 S/CO RATIO (ref 0–0.9)
SPECIMEN STATUS REPORT: NORMAL
SPECIMEN STATUS REPORT: NORMAL

## 2021-04-28 ENCOUNTER — TELEPHONE (OUTPATIENT)
Dept: CARDIOLOGY | Facility: HOSPITAL | Age: 56
End: 2021-04-28

## 2021-04-29 ENCOUNTER — CLINICAL SUPPORT (OUTPATIENT)
Dept: CARDIOLOGY | Facility: HOSPITAL | Age: 56
End: 2021-04-29
Attending: SPECIALIST
Payer: COMMERCIAL

## 2021-04-29 ENCOUNTER — HOSPITAL ENCOUNTER (OUTPATIENT)
Dept: RADIOLOGY | Facility: HOSPITAL | Age: 56
Discharge: HOME | End: 2021-04-29
Attending: SPECIALIST
Payer: COMMERCIAL

## 2021-04-29 VITALS
HEIGHT: 71 IN | WEIGHT: 315 LBS | BODY MASS INDEX: 44.1 KG/M2 | BODY MASS INDEX: 44.1 KG/M2 | HEIGHT: 71 IN | WEIGHT: 315 LBS

## 2021-04-29 DIAGNOSIS — I25.118 CORONARY ARTERY DISEASE INVOLVING NATIVE CORONARY ARTERY OF NATIVE HEART WITH OTHER FORM OF ANGINA PECTORIS: ICD-10-CM

## 2021-04-29 LAB
AORTIC ROOT ANNULUS: 3.55 CM
AORTIC VALVE CUSP SEPERATION: 2.24 CM
AV INDEX (PROSTH): 0.64
AV MEAN GRADIENT: 7 MMHG
AV PEAK GRADIENT: 13 MMHG
AV VALVE AREA: 3.12 CM2
AV VELOCITY RATIO: 60.49
BSA FOR ECHO PROCEDURE: 2.81 M2
CV ECHO LV RWT: 0.44 CM
CV PHARM DOSE: 0.4 MG
CV STRESS BASE HR: 96 BPM
DIASTOLIC BLOOD PRESSURE: 88 MMHG
DOP CALC AO PEAK VEL: 1.77 M/S
DOP CALC AO VTI: 32.7 CM
DOP CALC LVOT AREA: 4.9 CM2
DOP CALC LVOT DIAMETER: 2.5 CM
DOP CALC LVOT PEAK VEL: 107.07 M/S
DOP CALC LVOT STROKE VOLUME: 101.9 CM3
DOP CALCLVOT PEAK VEL VTI: 20.77 CM
E WAVE DECELERATION TIME: 232.06 MSEC
E/A RATIO: 0.95
E/E' RATIO: 7.3 M/S
ECHO LV POSTERIOR WALL: 1.21 CM (ref 0.6–1.1)
EJECTION FRACTION: 58 %
FRACTIONAL SHORTENING: 25 % (ref 28–44)
INTERVENTRICULAR SEPTUM: 1.2 CM (ref 0.6–1.1)
IVRT: 66.3 MSEC
LEFT ATRIUM SIZE: 4.53 CM
LEFT INTERNAL DIMENSION IN SYSTOLE: 4.09 CM (ref 2.1–4)
LEFT VENTRICLE MASS INDEX: 102 G/M2
LEFT VENTRICULAR INTERNAL DIMENSION IN DIASTOLE: 5.47 CM (ref 3.5–6)
LEFT VENTRICULAR MASS: 271.53 G
LV LATERAL E/E' RATIO: 5.6 M/S
LV SEPTAL E/E' RATIO: 10.5 M/S
MV PEAK A VEL: 0.88 M/S
MV PEAK E VEL: 0.84 M/S
OHS CV CPX 1 MINUTE RECOVERY HEART RATE: 116 BPM
OHS CV CPX 85 PERCENT MAX PREDICTED HEART RATE MALE: 141
OHS CV CPX MAX PREDICTED HEART RATE: 166
OHS CV CPX PATIENT IS FEMALE: 0
OHS CV CPX PATIENT IS MALE: 1
OHS CV CPX PEAK DIASTOLIC BLOOD PRESSURE: 61 MMHG
OHS CV CPX PEAK HEAR RATE: 117 BPM
OHS CV CPX PEAK RATE PRESSURE PRODUCT: NORMAL
OHS CV CPX PEAK SYSTOLIC BLOOD PRESSURE: 184 MMHG
OHS CV CPX PERCENT MAX PREDICTED HEART RATE ACHIEVED: 70
OHS CV CPX RATE PRESSURE PRODUCT PRESENTING: NORMAL
PISA TR MAX VEL: 2.43 M/S
PV PEAK VELOCITY: 145.69 CM/S
RA PRESSURE: 3 MMHG
RIGHT VENTRICULAR END-DIASTOLIC DIMENSION: 252 CM
SYSTOLIC BLOOD PRESSURE: 167 MMHG
TDI LATERAL: 0.15 M/S
TDI SEPTAL: 0.08 M/S
TDI: 0.12 M/S
TR MAX PG: 24 MMHG
TV REST PULMONARY ARTERY PRESSURE: 27 MMHG

## 2021-04-29 PROCEDURE — A9502 TC99M TETROFOSMIN: HCPCS

## 2021-04-29 PROCEDURE — 63600175 PHARM REV CODE 636 W HCPCS: Performed by: SPECIALIST

## 2021-04-29 PROCEDURE — 78452 HT MUSCLE IMAGE SPECT MULT: CPT | Mod: TC

## 2021-04-29 PROCEDURE — 93017 CV STRESS TEST TRACING ONLY: CPT

## 2021-04-29 PROCEDURE — 93306 TTE W/DOPPLER COMPLETE: CPT

## 2021-04-29 RX ORDER — REGADENOSON 0.08 MG/ML
0.4 INJECTION, SOLUTION INTRAVENOUS
Status: COMPLETED | OUTPATIENT
Start: 2021-04-29 | End: 2021-04-29

## 2021-04-29 RX ADMIN — REGADENOSON 0.4 MG: 0.08 INJECTION, SOLUTION INTRAVENOUS at 10:04

## 2021-04-30 ENCOUNTER — HOSPITAL ENCOUNTER (OUTPATIENT)
Dept: RADIOLOGY | Facility: HOSPITAL | Age: 56
Discharge: HOME | End: 2021-04-30
Attending: SPECIALIST
Payer: COMMERCIAL

## 2021-05-12 ENCOUNTER — OFFICE VISIT (OUTPATIENT)
Dept: FAMILY MEDICINE | Facility: CLINIC | Age: 56
End: 2021-05-12
Payer: COMMERCIAL

## 2021-05-12 VITALS
DIASTOLIC BLOOD PRESSURE: 86 MMHG | TEMPERATURE: 98 F | BODY MASS INDEX: 44.1 KG/M2 | WEIGHT: 315 LBS | OXYGEN SATURATION: 95 % | SYSTOLIC BLOOD PRESSURE: 130 MMHG | HEART RATE: 88 BPM | HEIGHT: 71 IN

## 2021-05-12 DIAGNOSIS — I10 ESSENTIAL HYPERTENSION: Primary | ICD-10-CM

## 2021-05-12 DIAGNOSIS — Z12.5 SCREENING PSA (PROSTATE SPECIFIC ANTIGEN): ICD-10-CM

## 2021-05-12 DIAGNOSIS — M1A.0790 CHRONIC GOUT OF FOOT, UNSPECIFIED CAUSE, UNSPECIFIED LATERALITY: ICD-10-CM

## 2021-05-12 DIAGNOSIS — R73.09 ELEVATED GLUCOSE: ICD-10-CM

## 2021-05-12 DIAGNOSIS — E78.00 PURE HYPERCHOLESTEROLEMIA: ICD-10-CM

## 2021-05-12 DIAGNOSIS — Z12.11 COLON CANCER SCREENING: ICD-10-CM

## 2021-05-12 DIAGNOSIS — G47.9 SLEEP DISTURBANCE: ICD-10-CM

## 2021-05-12 LAB — HBA1C MFR BLD: 6.2 %

## 2021-05-12 PROCEDURE — 83036 POCT HEMOGLOBIN A1C: ICD-10-PCS | Mod: QW,,, | Performed by: NURSE PRACTITIONER

## 2021-05-12 PROCEDURE — 99214 PR OFFICE/OUTPT VISIT, EST, LEVL IV, 30-39 MIN: ICD-10-PCS | Mod: S$GLB,,, | Performed by: NURSE PRACTITIONER

## 2021-05-12 PROCEDURE — 3079F PR MOST RECENT DIASTOLIC BLOOD PRESSURE 80-89 MM HG: ICD-10-PCS | Mod: S$GLB,,, | Performed by: NURSE PRACTITIONER

## 2021-05-12 PROCEDURE — 3079F DIAST BP 80-89 MM HG: CPT | Mod: S$GLB,,, | Performed by: NURSE PRACTITIONER

## 2021-05-12 PROCEDURE — 3075F SYST BP GE 130 - 139MM HG: CPT | Mod: S$GLB,,, | Performed by: NURSE PRACTITIONER

## 2021-05-12 PROCEDURE — 3075F PR MOST RECENT SYSTOLIC BLOOD PRESS GE 130-139MM HG: ICD-10-PCS | Mod: S$GLB,,, | Performed by: NURSE PRACTITIONER

## 2021-05-12 PROCEDURE — 99214 OFFICE O/P EST MOD 30 MIN: CPT | Mod: S$GLB,,, | Performed by: NURSE PRACTITIONER

## 2021-05-12 PROCEDURE — 3008F BODY MASS INDEX DOCD: CPT | Mod: S$GLB,,, | Performed by: NURSE PRACTITIONER

## 2021-05-12 PROCEDURE — 3008F PR BODY MASS INDEX (BMI) DOCUMENTED: ICD-10-PCS | Mod: S$GLB,,, | Performed by: NURSE PRACTITIONER

## 2021-05-12 PROCEDURE — 83036 HEMOGLOBIN GLYCOSYLATED A1C: CPT | Mod: QW,,, | Performed by: NURSE PRACTITIONER

## 2021-05-12 RX ORDER — ALLOPURINOL 300 MG/1
300 TABLET ORAL DAILY
Qty: 90 TABLET | Refills: 0 | Status: SHIPPED | OUTPATIENT
Start: 2021-05-12 | End: 2021-08-06

## 2021-07-30 ENCOUNTER — TELEPHONE (OUTPATIENT)
Dept: FAMILY MEDICINE | Facility: CLINIC | Age: 56
End: 2021-07-30

## 2021-07-30 ENCOUNTER — LAB VISIT (OUTPATIENT)
Dept: LAB | Facility: HOSPITAL | Age: 56
End: 2021-07-30
Attending: NURSE PRACTITIONER
Payer: COMMERCIAL

## 2021-07-30 DIAGNOSIS — R73.09 ELEVATED GLUCOSE: ICD-10-CM

## 2021-07-30 DIAGNOSIS — E87.6 HYPOKALEMIA: Primary | ICD-10-CM

## 2021-07-30 DIAGNOSIS — I10 ESSENTIAL HYPERTENSION: ICD-10-CM

## 2021-07-30 DIAGNOSIS — M1A.0790 CHRONIC GOUT OF FOOT, UNSPECIFIED CAUSE, UNSPECIFIED LATERALITY: ICD-10-CM

## 2021-07-30 DIAGNOSIS — E78.00 PURE HYPERCHOLESTEROLEMIA: ICD-10-CM

## 2021-07-30 DIAGNOSIS — Z12.5 SCREENING PSA (PROSTATE SPECIFIC ANTIGEN): ICD-10-CM

## 2021-07-30 LAB
ALBUMIN SERPL BCP-MCNC: 3.7 G/DL (ref 3.5–5.2)
ALP SERPL-CCNC: 83 U/L (ref 55–135)
ALT SERPL W/O P-5'-P-CCNC: 33 U/L (ref 10–44)
ANION GAP SERPL CALC-SCNC: 10 MMOL/L (ref 8–16)
AST SERPL-CCNC: 39 U/L (ref 10–40)
BILIRUB SERPL-MCNC: 0.8 MG/DL (ref 0.1–1)
BUN SERPL-MCNC: 22 MG/DL (ref 6–20)
CALCIUM SERPL-MCNC: 8.9 MG/DL (ref 8.7–10.5)
CHLORIDE SERPL-SCNC: 98 MMOL/L (ref 95–110)
CHOLEST SERPL-MCNC: 188 MG/DL (ref 120–199)
CHOLEST/HDLC SERPL: 4.5 {RATIO} (ref 2–5)
CO2 SERPL-SCNC: 29 MMOL/L (ref 23–29)
COMPLEXED PSA SERPL-MCNC: 0.96 NG/ML (ref 0–4)
CREAT SERPL-MCNC: 0.8 MG/DL (ref 0.5–1.4)
EST. GFR  (AFRICAN AMERICAN): >60 ML/MIN/1.73 M^2
EST. GFR  (NON AFRICAN AMERICAN): >60 ML/MIN/1.73 M^2
ESTIMATED AVG GLUCOSE: 154 MG/DL (ref 68–131)
GLUCOSE SERPL-MCNC: 131 MG/DL (ref 70–110)
HBA1C MFR BLD: 7 % (ref 4.5–6.2)
HDLC SERPL-MCNC: 42 MG/DL (ref 40–75)
HDLC SERPL: 22.3 % (ref 20–50)
LDLC SERPL CALC-MCNC: 94.2 MG/DL (ref 63–159)
NONHDLC SERPL-MCNC: 146 MG/DL
POTASSIUM SERPL-SCNC: 3.3 MMOL/L (ref 3.5–5.1)
PROT SERPL-MCNC: 6.9 G/DL (ref 6–8.4)
SODIUM SERPL-SCNC: 137 MMOL/L (ref 136–145)
TRIGL SERPL-MCNC: 259 MG/DL (ref 30–150)
URATE SERPL-MCNC: 5.7 MG/DL (ref 3.4–7)

## 2021-07-30 PROCEDURE — 36415 COLL VENOUS BLD VENIPUNCTURE: CPT | Performed by: NURSE PRACTITIONER

## 2021-07-30 PROCEDURE — 80061 LIPID PANEL: CPT | Performed by: NURSE PRACTITIONER

## 2021-07-30 PROCEDURE — 84153 ASSAY OF PSA TOTAL: CPT | Performed by: NURSE PRACTITIONER

## 2021-07-30 PROCEDURE — 84550 ASSAY OF BLOOD/URIC ACID: CPT | Performed by: NURSE PRACTITIONER

## 2021-07-30 PROCEDURE — 80053 COMPREHEN METABOLIC PANEL: CPT | Performed by: NURSE PRACTITIONER

## 2021-07-30 PROCEDURE — 83036 HEMOGLOBIN GLYCOSYLATED A1C: CPT | Performed by: NURSE PRACTITIONER

## 2021-07-30 RX ORDER — POTASSIUM CHLORIDE 20 MEQ/1
20 TABLET, EXTENDED RELEASE ORAL DAILY
Qty: 30 TABLET | Refills: 0 | Status: SHIPPED | OUTPATIENT
Start: 2021-07-30 | End: 2021-08-06 | Stop reason: SDUPTHER

## 2021-08-06 ENCOUNTER — OFFICE VISIT (OUTPATIENT)
Dept: FAMILY MEDICINE | Facility: CLINIC | Age: 56
End: 2021-08-06
Payer: COMMERCIAL

## 2021-08-06 VITALS
WEIGHT: 315 LBS | HEIGHT: 71 IN | DIASTOLIC BLOOD PRESSURE: 84 MMHG | SYSTOLIC BLOOD PRESSURE: 126 MMHG | HEART RATE: 80 BPM | BODY MASS INDEX: 44.1 KG/M2

## 2021-08-06 DIAGNOSIS — M1A.0790 CHRONIC GOUT OF FOOT, UNSPECIFIED CAUSE, UNSPECIFIED LATERALITY: ICD-10-CM

## 2021-08-06 DIAGNOSIS — E87.6 HYPOKALEMIA: ICD-10-CM

## 2021-08-06 DIAGNOSIS — E11.9 TYPE 2 DIABETES MELLITUS TREATED WITHOUT INSULIN: ICD-10-CM

## 2021-08-06 DIAGNOSIS — I10 ESSENTIAL HYPERTENSION: Primary | ICD-10-CM

## 2021-08-06 DIAGNOSIS — E78.00 PURE HYPERCHOLESTEROLEMIA: ICD-10-CM

## 2021-08-06 PROCEDURE — 3074F PR MOST RECENT SYSTOLIC BLOOD PRESSURE < 130 MM HG: ICD-10-PCS | Mod: S$GLB,,, | Performed by: NURSE PRACTITIONER

## 2021-08-06 PROCEDURE — 3008F PR BODY MASS INDEX (BMI) DOCUMENTED: ICD-10-PCS | Mod: S$GLB,,, | Performed by: NURSE PRACTITIONER

## 2021-08-06 PROCEDURE — 3008F BODY MASS INDEX DOCD: CPT | Mod: S$GLB,,, | Performed by: NURSE PRACTITIONER

## 2021-08-06 PROCEDURE — 99214 PR OFFICE/OUTPT VISIT, EST, LEVL IV, 30-39 MIN: ICD-10-PCS | Mod: S$GLB,,, | Performed by: NURSE PRACTITIONER

## 2021-08-06 PROCEDURE — 1160F RVW MEDS BY RX/DR IN RCRD: CPT | Mod: S$GLB,,, | Performed by: NURSE PRACTITIONER

## 2021-08-06 PROCEDURE — 3074F SYST BP LT 130 MM HG: CPT | Mod: S$GLB,,, | Performed by: NURSE PRACTITIONER

## 2021-08-06 PROCEDURE — 3051F HG A1C>EQUAL 7.0%<8.0%: CPT | Mod: S$GLB,,, | Performed by: NURSE PRACTITIONER

## 2021-08-06 PROCEDURE — 3079F PR MOST RECENT DIASTOLIC BLOOD PRESSURE 80-89 MM HG: ICD-10-PCS | Mod: S$GLB,,, | Performed by: NURSE PRACTITIONER

## 2021-08-06 PROCEDURE — 1160F PR REVIEW ALL MEDS BY PRESCRIBER/CLIN PHARMACIST DOCUMENTED: ICD-10-PCS | Mod: S$GLB,,, | Performed by: NURSE PRACTITIONER

## 2021-08-06 PROCEDURE — 3051F PR MOST RECENT HEMOGLOBIN A1C LEVEL 7.0 - < 8.0%: ICD-10-PCS | Mod: S$GLB,,, | Performed by: NURSE PRACTITIONER

## 2021-08-06 PROCEDURE — 99214 OFFICE O/P EST MOD 30 MIN: CPT | Mod: S$GLB,,, | Performed by: NURSE PRACTITIONER

## 2021-08-06 PROCEDURE — 3079F DIAST BP 80-89 MM HG: CPT | Mod: S$GLB,,, | Performed by: NURSE PRACTITIONER

## 2021-08-06 RX ORDER — METFORMIN HYDROCHLORIDE 500 MG/1
500 TABLET, EXTENDED RELEASE ORAL
Qty: 90 TABLET | Refills: 1 | Status: SHIPPED | OUTPATIENT
Start: 2021-08-06 | End: 2021-11-05

## 2021-08-06 RX ORDER — ALLOPURINOL 300 MG/1
300 TABLET ORAL DAILY
Qty: 90 TABLET | Refills: 1 | Status: SHIPPED | OUTPATIENT
Start: 2021-08-06 | End: 2022-04-04

## 2021-08-06 RX ORDER — POTASSIUM CHLORIDE 20 MEQ/1
20 TABLET, EXTENDED RELEASE ORAL DAILY
Qty: 90 TABLET | Refills: 0 | Status: SHIPPED | OUTPATIENT
Start: 2021-08-06 | End: 2021-12-07

## 2021-09-11 ENCOUNTER — HOSPITAL ENCOUNTER (INPATIENT)
Facility: HOSPITAL | Age: 56
LOS: 3 days | Discharge: HOME | DRG: 605 | End: 2021-09-14
Attending: EMERGENCY MEDICINE | Admitting: INTERNAL MEDICINE
Payer: COMMERCIAL

## 2021-09-11 DIAGNOSIS — L08.9 DIABETIC FOOT INFECTION: ICD-10-CM

## 2021-09-11 DIAGNOSIS — R07.9 CHEST PAIN: ICD-10-CM

## 2021-09-11 DIAGNOSIS — E11.628 DIABETIC FOOT INFECTION: ICD-10-CM

## 2021-09-11 PROBLEM — E11.9 DIABETES MELLITUS TYPE 2, NONINSULIN DEPENDENT: Status: ACTIVE | Noted: 2021-09-11

## 2021-09-11 PROBLEM — L03.115 CELLULITIS OF RIGHT LOWER EXTREMITY: Status: ACTIVE | Noted: 2021-09-11

## 2021-09-11 PROBLEM — G47.33 OSA (OBSTRUCTIVE SLEEP APNEA): Status: ACTIVE | Noted: 2021-09-11

## 2021-09-11 LAB
ALBUMIN SERPL BCP-MCNC: 3.7 G/DL (ref 3.5–5.2)
ALP SERPL-CCNC: 88 U/L (ref 55–135)
ALT SERPL W/O P-5'-P-CCNC: 42 U/L (ref 10–44)
ANION GAP SERPL CALC-SCNC: 18 MMOL/L (ref 8–16)
APTT PPP: 32.6 SEC (ref 25.6–35.8)
AST SERPL-CCNC: 42 U/L (ref 10–40)
BACTERIA #/AREA URNS HPF: NEGATIVE /HPF
BASOPHILS # BLD AUTO: 0.07 K/UL (ref 0–0.2)
BASOPHILS NFR BLD: 0.7 % (ref 0–1.9)
BILIRUB SERPL-MCNC: 1 MG/DL (ref 0.1–1)
BILIRUB UR QL STRIP: NEGATIVE
BNP SERPL-MCNC: 51 PG/ML (ref 0–99)
BUN SERPL-MCNC: 21 MG/DL (ref 6–20)
CALCIUM SERPL-MCNC: 9.4 MG/DL (ref 8.7–10.5)
CHLORIDE SERPL-SCNC: 96 MMOL/L (ref 95–110)
CK SERPL-CCNC: 96 U/L (ref 20–200)
CLARITY UR: CLEAR
CO2 SERPL-SCNC: 29 MMOL/L (ref 23–29)
COLOR UR: YELLOW
CREAT SERPL-MCNC: 0.9 MG/DL (ref 0.5–1.4)
CRP SERPL-MCNC: 12.41 MG/DL
DIFFERENTIAL METHOD: ABNORMAL
EOSINOPHIL # BLD AUTO: 0.3 K/UL (ref 0–0.5)
EOSINOPHIL NFR BLD: 2.7 % (ref 0–8)
ERYTHROCYTE [DISTWIDTH] IN BLOOD BY AUTOMATED COUNT: 14.6 % (ref 11.5–14.5)
EST. GFR  (AFRICAN AMERICAN): >60 ML/MIN/1.73 M^2
EST. GFR  (NON AFRICAN AMERICAN): >60 ML/MIN/1.73 M^2
GLUCOSE SERPL-MCNC: 123 MG/DL (ref 70–110)
GLUCOSE SERPL-MCNC: 126 MG/DL (ref 70–110)
GLUCOSE UR QL STRIP: NEGATIVE
HCT VFR BLD AUTO: 38 % (ref 40–54)
HGB BLD-MCNC: 12.7 G/DL (ref 14–18)
HGB UR QL STRIP: NEGATIVE
HYALINE CASTS #/AREA URNS LPF: 1 /LPF
IMM GRANULOCYTES # BLD AUTO: 0.08 K/UL (ref 0–0.04)
IMM GRANULOCYTES NFR BLD AUTO: 0.8 % (ref 0–0.5)
INR PPP: 1.1
KETONES UR QL STRIP: NEGATIVE
LACTATE SERPL-SCNC: 1.7 MMOL/L (ref 0.5–1.9)
LEUKOCYTE ESTERASE UR QL STRIP: ABNORMAL
LIPASE SERPL-CCNC: 50 U/L (ref 4–60)
LYMPHOCYTES # BLD AUTO: 1.4 K/UL (ref 1–4.8)
LYMPHOCYTES NFR BLD: 14.5 % (ref 18–48)
MAGNESIUM SERPL-MCNC: 1.7 MG/DL (ref 1.6–2.6)
MCH RBC QN AUTO: 30.8 PG (ref 27–31)
MCHC RBC AUTO-ENTMCNC: 33.4 G/DL (ref 32–36)
MCV RBC AUTO: 92 FL (ref 82–98)
MICROSCOPIC COMMENT: NORMAL
MONOCYTES # BLD AUTO: 1 K/UL (ref 0.3–1)
MONOCYTES NFR BLD: 10.1 % (ref 4–15)
NEUTROPHILS # BLD AUTO: 6.9 K/UL (ref 1.8–7.7)
NEUTROPHILS NFR BLD: 71.2 % (ref 38–73)
NITRITE UR QL STRIP: NEGATIVE
NRBC BLD-RTO: 0 /100 WBC
PH UR STRIP: 7 [PH] (ref 5–8)
PHOSPHATE SERPL-MCNC: 2.9 MG/DL (ref 2.7–4.5)
PLATELET # BLD AUTO: 229 K/UL (ref 150–450)
PMV BLD AUTO: 10.2 FL (ref 9.2–12.9)
POTASSIUM SERPL-SCNC: 3.4 MMOL/L (ref 3.5–5.1)
PROCALCITONIN SERPL IA-MCNC: <0.05 NG/ML (ref 0–0.5)
PROT SERPL-MCNC: 7.5 G/DL (ref 6–8.4)
PROT UR QL STRIP: NEGATIVE
PROTHROMBIN TIME: 14 SEC (ref 11.8–14.3)
RBC # BLD AUTO: 4.13 M/UL (ref 4.6–6.2)
RBC #/AREA URNS HPF: 1 /HPF (ref 0–4)
SARS-COV-2 RDRP RESP QL NAA+PROBE: NEGATIVE
SODIUM SERPL-SCNC: 143 MMOL/L (ref 136–145)
SP GR UR STRIP: 1.01 (ref 1–1.03)
SQUAMOUS #/AREA URNS HPF: 1 /HPF
TROPONIN I SERPL DL<=0.01 NG/ML-MCNC: 0.04 NG/ML
URN SPEC COLLECT METH UR: ABNORMAL
UROBILINOGEN UR STRIP-ACNC: NEGATIVE EU/DL
WBC # BLD AUTO: 9.68 K/UL (ref 3.9–12.7)
WBC #/AREA URNS HPF: 4 /HPF (ref 0–5)

## 2021-09-11 PROCEDURE — 96366 THER/PROPH/DIAG IV INF ADDON: CPT

## 2021-09-11 PROCEDURE — 96375 TX/PRO/DX INJ NEW DRUG ADDON: CPT

## 2021-09-11 PROCEDURE — 99285 EMERGENCY DEPT VISIT HI MDM: CPT | Mod: 25

## 2021-09-11 PROCEDURE — 93005 ELECTROCARDIOGRAM TRACING: CPT | Performed by: INTERNAL MEDICINE

## 2021-09-11 PROCEDURE — 96365 THER/PROPH/DIAG IV INF INIT: CPT

## 2021-09-11 PROCEDURE — 83735 ASSAY OF MAGNESIUM: CPT | Performed by: EMERGENCY MEDICINE

## 2021-09-11 PROCEDURE — 83880 ASSAY OF NATRIURETIC PEPTIDE: CPT | Performed by: EMERGENCY MEDICINE

## 2021-09-11 PROCEDURE — U0002 COVID-19 LAB TEST NON-CDC: HCPCS | Performed by: EMERGENCY MEDICINE

## 2021-09-11 PROCEDURE — 36415 COLL VENOUS BLD VENIPUNCTURE: CPT | Performed by: EMERGENCY MEDICINE

## 2021-09-11 PROCEDURE — 25500020 PHARM REV CODE 255: Performed by: EMERGENCY MEDICINE

## 2021-09-11 PROCEDURE — A9585 GADOBUTROL INJECTION: HCPCS | Performed by: EMERGENCY MEDICINE

## 2021-09-11 PROCEDURE — 84100 ASSAY OF PHOSPHORUS: CPT | Performed by: EMERGENCY MEDICINE

## 2021-09-11 PROCEDURE — 85610 PROTHROMBIN TIME: CPT | Performed by: EMERGENCY MEDICINE

## 2021-09-11 PROCEDURE — 63600175 PHARM REV CODE 636 W HCPCS: Performed by: NURSE PRACTITIONER

## 2021-09-11 PROCEDURE — 63600175 PHARM REV CODE 636 W HCPCS: Performed by: EMERGENCY MEDICINE

## 2021-09-11 PROCEDURE — 87040 BLOOD CULTURE FOR BACTERIA: CPT | Performed by: EMERGENCY MEDICINE

## 2021-09-11 PROCEDURE — 84484 ASSAY OF TROPONIN QUANT: CPT | Performed by: EMERGENCY MEDICINE

## 2021-09-11 PROCEDURE — 93010 EKG 12-LEAD: ICD-10-PCS | Mod: ,,, | Performed by: INTERNAL MEDICINE

## 2021-09-11 PROCEDURE — 81001 URINALYSIS AUTO W/SCOPE: CPT | Performed by: EMERGENCY MEDICINE

## 2021-09-11 PROCEDURE — 84145 PROCALCITONIN (PCT): CPT | Performed by: EMERGENCY MEDICINE

## 2021-09-11 PROCEDURE — 82550 ASSAY OF CK (CPK): CPT | Performed by: EMERGENCY MEDICINE

## 2021-09-11 PROCEDURE — 93010 ELECTROCARDIOGRAM REPORT: CPT | Mod: ,,, | Performed by: INTERNAL MEDICINE

## 2021-09-11 PROCEDURE — 83605 ASSAY OF LACTIC ACID: CPT | Performed by: EMERGENCY MEDICINE

## 2021-09-11 PROCEDURE — 12000002 HC ACUTE/MED SURGE SEMI-PRIVATE ROOM

## 2021-09-11 PROCEDURE — 83690 ASSAY OF LIPASE: CPT | Performed by: EMERGENCY MEDICINE

## 2021-09-11 PROCEDURE — 86140 C-REACTIVE PROTEIN: CPT | Performed by: EMERGENCY MEDICINE

## 2021-09-11 PROCEDURE — 85025 COMPLETE CBC W/AUTO DIFF WBC: CPT | Performed by: EMERGENCY MEDICINE

## 2021-09-11 PROCEDURE — 25000003 PHARM REV CODE 250: Performed by: NURSE PRACTITIONER

## 2021-09-11 PROCEDURE — 85730 THROMBOPLASTIN TIME PARTIAL: CPT | Performed by: EMERGENCY MEDICINE

## 2021-09-11 PROCEDURE — 25000003 PHARM REV CODE 250: Performed by: INTERNAL MEDICINE

## 2021-09-11 PROCEDURE — 80053 COMPREHEN METABOLIC PANEL: CPT | Performed by: EMERGENCY MEDICINE

## 2021-09-11 PROCEDURE — 25000003 PHARM REV CODE 250: Performed by: EMERGENCY MEDICINE

## 2021-09-11 RX ORDER — MAGNESIUM SULFATE HEPTAHYDRATE 40 MG/ML
2 INJECTION, SOLUTION INTRAVENOUS
Status: DISCONTINUED | OUTPATIENT
Start: 2021-09-11 | End: 2021-09-14 | Stop reason: HOSPADM

## 2021-09-11 RX ORDER — IBUPROFEN 200 MG
24 TABLET ORAL
Status: DISCONTINUED | OUTPATIENT
Start: 2021-09-11 | End: 2021-09-14 | Stop reason: HOSPADM

## 2021-09-11 RX ORDER — AMOXICILLIN 250 MG
1 CAPSULE ORAL 2 TIMES DAILY
Status: DISCONTINUED | OUTPATIENT
Start: 2021-09-11 | End: 2021-09-14 | Stop reason: HOSPADM

## 2021-09-11 RX ORDER — ENOXAPARIN SODIUM 100 MG/ML
40 INJECTION SUBCUTANEOUS EVERY 12 HOURS
Status: DISCONTINUED | OUTPATIENT
Start: 2021-09-11 | End: 2021-09-14 | Stop reason: HOSPADM

## 2021-09-11 RX ORDER — ONDANSETRON 2 MG/ML
4 INJECTION INTRAMUSCULAR; INTRAVENOUS EVERY 8 HOURS PRN
Status: DISCONTINUED | OUTPATIENT
Start: 2021-09-11 | End: 2021-09-13

## 2021-09-11 RX ORDER — ATENOLOL 50 MG/1
100 TABLET ORAL DAILY
Status: DISCONTINUED | OUTPATIENT
Start: 2021-09-12 | End: 2021-09-14 | Stop reason: HOSPADM

## 2021-09-11 RX ORDER — HYDROCODONE BITARTRATE AND ACETAMINOPHEN 5; 325 MG/1; MG/1
1 TABLET ORAL EVERY 6 HOURS PRN
Status: DISCONTINUED | OUTPATIENT
Start: 2021-09-11 | End: 2021-09-13

## 2021-09-11 RX ORDER — SODIUM CHLORIDE 0.9 % (FLUSH) 0.9 %
10 SYRINGE (ML) INJECTION
Status: DISCONTINUED | OUTPATIENT
Start: 2021-09-11 | End: 2021-09-12

## 2021-09-11 RX ORDER — POTASSIUM CHLORIDE 7.45 MG/ML
20 INJECTION INTRAVENOUS
Status: DISCONTINUED | OUTPATIENT
Start: 2021-09-11 | End: 2021-09-14 | Stop reason: HOSPADM

## 2021-09-11 RX ORDER — INSULIN ASPART 100 [IU]/ML
0-5 INJECTION, SOLUTION INTRAVENOUS; SUBCUTANEOUS
Status: DISCONTINUED | OUTPATIENT
Start: 2021-09-11 | End: 2021-09-14 | Stop reason: HOSPADM

## 2021-09-11 RX ORDER — VALSARTAN 80 MG/1
320 TABLET ORAL DAILY
Status: DISCONTINUED | OUTPATIENT
Start: 2021-09-12 | End: 2021-09-14 | Stop reason: HOSPADM

## 2021-09-11 RX ORDER — ALLOPURINOL 300 MG/1
300 TABLET ORAL DAILY
Status: DISCONTINUED | OUTPATIENT
Start: 2021-09-12 | End: 2021-09-14 | Stop reason: HOSPADM

## 2021-09-11 RX ORDER — POTASSIUM CHLORIDE 7.45 MG/ML
40 INJECTION INTRAVENOUS
Status: DISCONTINUED | OUTPATIENT
Start: 2021-09-11 | End: 2021-09-14 | Stop reason: HOSPADM

## 2021-09-11 RX ORDER — POTASSIUM CHLORIDE 20 MEQ/1
40 TABLET, EXTENDED RELEASE ORAL
Status: DISCONTINUED | OUTPATIENT
Start: 2021-09-11 | End: 2021-09-14 | Stop reason: HOSPADM

## 2021-09-11 RX ORDER — CLONIDINE HYDROCHLORIDE 0.1 MG/1
0.1 TABLET ORAL 2 TIMES DAILY
Status: DISCONTINUED | OUTPATIENT
Start: 2021-09-11 | End: 2021-09-14 | Stop reason: HOSPADM

## 2021-09-11 RX ORDER — POTASSIUM CHLORIDE 20 MEQ/1
20 TABLET, EXTENDED RELEASE ORAL
Status: DISCONTINUED | OUTPATIENT
Start: 2021-09-11 | End: 2021-09-14 | Stop reason: HOSPADM

## 2021-09-11 RX ORDER — TALC
6 POWDER (GRAM) TOPICAL NIGHTLY PRN
Status: DISCONTINUED | OUTPATIENT
Start: 2021-09-11 | End: 2021-09-14 | Stop reason: HOSPADM

## 2021-09-11 RX ORDER — POLYETHYLENE GLYCOL 3350 17 G/17G
17 POWDER, FOR SOLUTION ORAL 2 TIMES DAILY PRN
Status: DISCONTINUED | OUTPATIENT
Start: 2021-09-11 | End: 2021-09-14 | Stop reason: HOSPADM

## 2021-09-11 RX ORDER — PANTOPRAZOLE SODIUM 40 MG/1
40 TABLET, DELAYED RELEASE ORAL DAILY
Status: DISCONTINUED | OUTPATIENT
Start: 2021-09-12 | End: 2021-09-14 | Stop reason: HOSPADM

## 2021-09-11 RX ORDER — VANCOMYCIN HCL IN 5 % DEXTROSE 1G/250ML
1000 PLASTIC BAG, INJECTION (ML) INTRAVENOUS
Status: COMPLETED | OUTPATIENT
Start: 2021-09-11 | End: 2021-09-11

## 2021-09-11 RX ORDER — GADOBUTROL 604.72 MG/ML
9 INJECTION INTRAVENOUS
Status: COMPLETED | OUTPATIENT
Start: 2021-09-11 | End: 2021-09-11

## 2021-09-11 RX ORDER — MEROPENEM AND SODIUM CHLORIDE 1 G/50ML
1 INJECTION, SOLUTION INTRAVENOUS
Status: DISCONTINUED | OUTPATIENT
Start: 2021-09-11 | End: 2021-09-13

## 2021-09-11 RX ORDER — AMLODIPINE BESYLATE 5 MG/1
10 TABLET ORAL DAILY
Status: DISCONTINUED | OUTPATIENT
Start: 2021-09-12 | End: 2021-09-14 | Stop reason: HOSPADM

## 2021-09-11 RX ORDER — MORPHINE SULFATE 4 MG/ML
4 INJECTION, SOLUTION INTRAMUSCULAR; INTRAVENOUS EVERY 4 HOURS PRN
Status: DISCONTINUED | OUTPATIENT
Start: 2021-09-11 | End: 2021-09-13

## 2021-09-11 RX ORDER — DIPHENHYDRAMINE HCL 25 MG
25 CAPSULE ORAL
Status: COMPLETED | OUTPATIENT
Start: 2021-09-11 | End: 2021-09-11

## 2021-09-11 RX ORDER — MORPHINE SULFATE 2 MG/ML
2 INJECTION, SOLUTION INTRAMUSCULAR; INTRAVENOUS
Status: COMPLETED | OUTPATIENT
Start: 2021-09-11 | End: 2021-09-11

## 2021-09-11 RX ORDER — FUROSEMIDE 20 MG/1
20 TABLET ORAL DAILY
Status: DISCONTINUED | OUTPATIENT
Start: 2021-09-12 | End: 2021-09-14 | Stop reason: HOSPADM

## 2021-09-11 RX ORDER — ONDANSETRON 2 MG/ML
4 INJECTION INTRAMUSCULAR; INTRAVENOUS
Status: COMPLETED | OUTPATIENT
Start: 2021-09-11 | End: 2021-09-11

## 2021-09-11 RX ORDER — IBUPROFEN 200 MG
16 TABLET ORAL
Status: DISCONTINUED | OUTPATIENT
Start: 2021-09-11 | End: 2021-09-14 | Stop reason: HOSPADM

## 2021-09-11 RX ORDER — SODIUM CHLORIDE 9 MG/ML
INJECTION, SOLUTION INTRAVENOUS
Status: COMPLETED | OUTPATIENT
Start: 2021-09-11 | End: 2021-09-11

## 2021-09-11 RX ORDER — MAGNESIUM SULFATE 1 G/100ML
1 INJECTION INTRAVENOUS
Status: DISCONTINUED | OUTPATIENT
Start: 2021-09-11 | End: 2021-09-14 | Stop reason: HOSPADM

## 2021-09-11 RX ORDER — ACETAMINOPHEN 325 MG/1
650 TABLET ORAL EVERY 4 HOURS PRN
Status: DISCONTINUED | OUTPATIENT
Start: 2021-09-11 | End: 2021-09-14 | Stop reason: HOSPADM

## 2021-09-11 RX ORDER — MEROPENEM AND SODIUM CHLORIDE 1 G/50ML
1 INJECTION, SOLUTION INTRAVENOUS ONCE
Status: COMPLETED | OUTPATIENT
Start: 2021-09-11 | End: 2021-09-11

## 2021-09-11 RX ORDER — MAGNESIUM SULFATE HEPTAHYDRATE 40 MG/ML
4 INJECTION, SOLUTION INTRAVENOUS
Status: DISCONTINUED | OUTPATIENT
Start: 2021-09-11 | End: 2021-09-14 | Stop reason: HOSPADM

## 2021-09-11 RX ORDER — GLUCAGON 1 MG
1 KIT INJECTION
Status: DISCONTINUED | OUTPATIENT
Start: 2021-09-11 | End: 2021-09-14 | Stop reason: HOSPADM

## 2021-09-11 RX ORDER — MAGNESIUM SULFATE HEPTAHYDRATE 40 MG/ML
2 INJECTION, SOLUTION INTRAVENOUS ONCE
Status: COMPLETED | OUTPATIENT
Start: 2021-09-11 | End: 2021-09-11

## 2021-09-11 RX ORDER — LANOLIN ALCOHOL/MO/W.PET/CERES
800 CREAM (GRAM) TOPICAL
Status: DISCONTINUED | OUTPATIENT
Start: 2021-09-11 | End: 2021-09-14 | Stop reason: HOSPADM

## 2021-09-11 RX ADMIN — MAGNESIUM SULFATE 2 G: 2 INJECTION INTRAVENOUS at 05:09

## 2021-09-11 RX ADMIN — MEROPENEM AND SODIUM CHLORIDE 1 G: 1 INJECTION, SOLUTION INTRAVENOUS at 01:09

## 2021-09-11 RX ADMIN — CLONIDINE HYDROCHLORIDE 0.1 MG: 0.1 TABLET ORAL at 09:09

## 2021-09-11 RX ADMIN — ONDANSETRON 4 MG: 2 INJECTION INTRAMUSCULAR; INTRAVENOUS at 02:09

## 2021-09-11 RX ADMIN — VANCOMYCIN HYDROCHLORIDE 1000 MG: 1 INJECTION, POWDER, LYOPHILIZED, FOR SOLUTION INTRAVENOUS at 01:09

## 2021-09-11 RX ADMIN — HYDROCODONE BITARTRATE AND ACETAMINOPHEN 1 TABLET: 5; 325 TABLET ORAL at 09:09

## 2021-09-11 RX ADMIN — ENOXAPARIN SODIUM 40 MG: 40 INJECTION SUBCUTANEOUS at 09:09

## 2021-09-11 RX ADMIN — POTASSIUM BICARBONATE 50 MEQ: 977.5 TABLET, EFFERVESCENT ORAL at 05:09

## 2021-09-11 RX ADMIN — GADOBUTROL 9 ML: 604.72 INJECTION INTRAVENOUS at 07:09

## 2021-09-11 RX ADMIN — SODIUM CHLORIDE: 0.9 INJECTION, SOLUTION INTRAVENOUS at 01:09

## 2021-09-11 RX ADMIN — MORPHINE SULFATE 2 MG: 2 INJECTION, SOLUTION INTRAMUSCULAR; INTRAVENOUS at 02:09

## 2021-09-11 RX ADMIN — DIPHENHYDRAMINE HYDROCHLORIDE 25 MG: 25 CAPSULE ORAL at 05:09

## 2021-09-11 RX ADMIN — TETANUS IMMUNE GLOBULIN (HUMAN) 250 UNITS: 250 INJECTION INTRAMUSCULAR at 05:09

## 2021-09-12 LAB
ANION GAP SERPL CALC-SCNC: 13 MMOL/L (ref 8–16)
BUN SERPL-MCNC: 18 MG/DL (ref 6–20)
CALCIUM SERPL-MCNC: 9.2 MG/DL (ref 8.7–10.5)
CHLORIDE SERPL-SCNC: 96 MMOL/L (ref 95–110)
CO2 SERPL-SCNC: 28 MMOL/L (ref 23–29)
CREAT SERPL-MCNC: 0.9 MG/DL (ref 0.5–1.4)
EST. GFR  (AFRICAN AMERICAN): >60 ML/MIN/1.73 M^2
EST. GFR  (NON AFRICAN AMERICAN): >60 ML/MIN/1.73 M^2
GLUCOSE SERPL-MCNC: 119 MG/DL (ref 70–110)
GLUCOSE SERPL-MCNC: 134 MG/DL (ref 70–110)
GLUCOSE SERPL-MCNC: 140 MG/DL (ref 70–110)
GLUCOSE SERPL-MCNC: 161 MG/DL (ref 70–110)
GLUCOSE SERPL-MCNC: 162 MG/DL (ref 70–110)
MAGNESIUM SERPL-MCNC: 2 MG/DL (ref 1.6–2.6)
POTASSIUM SERPL-SCNC: 3.5 MMOL/L (ref 3.5–5.1)
SODIUM SERPL-SCNC: 137 MMOL/L (ref 136–145)

## 2021-09-12 PROCEDURE — 87186 SC STD MICRODIL/AGAR DIL: CPT | Performed by: PODIATRIST

## 2021-09-12 PROCEDURE — 87075 CULTR BACTERIA EXCEPT BLOOD: CPT | Performed by: PODIATRIST

## 2021-09-12 PROCEDURE — 83735 ASSAY OF MAGNESIUM: CPT | Performed by: NURSE PRACTITIONER

## 2021-09-12 PROCEDURE — 63600175 PHARM REV CODE 636 W HCPCS: Performed by: NURSE PRACTITIONER

## 2021-09-12 PROCEDURE — 87070 CULTURE OTHR SPECIMN AEROBIC: CPT | Performed by: PODIATRIST

## 2021-09-12 PROCEDURE — 87147 CULTURE TYPE IMMUNOLOGIC: CPT | Mod: 59 | Performed by: PODIATRIST

## 2021-09-12 PROCEDURE — 99222 1ST HOSP IP/OBS MODERATE 55: CPT | Mod: ,,, | Performed by: PODIATRIST

## 2021-09-12 PROCEDURE — 25000003 PHARM REV CODE 250: Performed by: NURSE PRACTITIONER

## 2021-09-12 PROCEDURE — 99222 PR INITIAL HOSPITAL CARE,LEVL II: ICD-10-PCS | Mod: ,,, | Performed by: PODIATRIST

## 2021-09-12 PROCEDURE — 36415 COLL VENOUS BLD VENIPUNCTURE: CPT | Performed by: NURSE PRACTITIONER

## 2021-09-12 PROCEDURE — 12000002 HC ACUTE/MED SURGE SEMI-PRIVATE ROOM

## 2021-09-12 PROCEDURE — 63600175 PHARM REV CODE 636 W HCPCS: Performed by: INTERNAL MEDICINE

## 2021-09-12 PROCEDURE — 25000003 PHARM REV CODE 250: Performed by: INTERNAL MEDICINE

## 2021-09-12 PROCEDURE — 80048 BASIC METABOLIC PNL TOTAL CA: CPT | Performed by: NURSE PRACTITIONER

## 2021-09-12 PROCEDURE — 87077 CULTURE AEROBIC IDENTIFY: CPT | Performed by: PODIATRIST

## 2021-09-12 RX ORDER — SODIUM CHLORIDE 0.9 % (FLUSH) 0.9 %
10 SYRINGE (ML) INJECTION
Status: DISCONTINUED | OUTPATIENT
Start: 2021-09-12 | End: 2021-09-14 | Stop reason: HOSPADM

## 2021-09-12 RX ADMIN — VANCOMYCIN HYDROCHLORIDE 2000 MG: 500 INJECTION, POWDER, LYOPHILIZED, FOR SOLUTION INTRAVENOUS at 03:09

## 2021-09-12 RX ADMIN — ENOXAPARIN SODIUM 40 MG: 40 INJECTION SUBCUTANEOUS at 09:09

## 2021-09-12 RX ADMIN — ALLOPURINOL 300 MG: 300 TABLET ORAL at 09:09

## 2021-09-12 RX ADMIN — AMLODIPINE BESYLATE 10 MG: 5 TABLET ORAL at 09:09

## 2021-09-12 RX ADMIN — SENNOSIDES AND DOCUSATE SODIUM 1 TABLET: 8.6; 5 TABLET ORAL at 09:09

## 2021-09-12 RX ADMIN — MEROPENEM AND SODIUM CHLORIDE 1 G: 1 INJECTION, SOLUTION INTRAVENOUS at 01:09

## 2021-09-12 RX ADMIN — HYDROCODONE BITARTRATE AND ACETAMINOPHEN 1 TABLET: 5; 325 TABLET ORAL at 10:09

## 2021-09-12 RX ADMIN — PANTOPRAZOLE SODIUM 40 MG: 40 TABLET, DELAYED RELEASE ORAL at 05:09

## 2021-09-12 RX ADMIN — VANCOMYCIN HYDROCHLORIDE 2000 MG: 500 INJECTION, POWDER, LYOPHILIZED, FOR SOLUTION INTRAVENOUS at 01:09

## 2021-09-12 RX ADMIN — MEROPENEM AND SODIUM CHLORIDE 1 G: 1 INJECTION, SOLUTION INTRAVENOUS at 04:09

## 2021-09-12 RX ADMIN — VALSARTAN 320 MG: 80 TABLET, FILM COATED ORAL at 09:09

## 2021-09-12 RX ADMIN — HYDROCODONE BITARTRATE AND ACETAMINOPHEN 1 TABLET: 5; 325 TABLET ORAL at 04:09

## 2021-09-12 RX ADMIN — CLONIDINE HYDROCHLORIDE 0.1 MG: 0.1 TABLET ORAL at 09:09

## 2021-09-12 RX ADMIN — ATENOLOL 100 MG: 25 TABLET ORAL at 09:09

## 2021-09-12 RX ADMIN — FUROSEMIDE 20 MG: 20 TABLET ORAL at 09:09

## 2021-09-12 RX ADMIN — MEROPENEM AND SODIUM CHLORIDE 1 G: 1 INJECTION, SOLUTION INTRAVENOUS at 09:09

## 2021-09-13 ENCOUNTER — ANESTHESIA EVENT (OUTPATIENT)
Dept: SURGERY | Facility: HOSPITAL | Age: 56
DRG: 605 | End: 2021-09-13
Payer: COMMERCIAL

## 2021-09-13 ENCOUNTER — ANESTHESIA (OUTPATIENT)
Dept: SURGERY | Facility: HOSPITAL | Age: 56
DRG: 605 | End: 2021-09-13
Payer: COMMERCIAL

## 2021-09-13 LAB
ANION GAP SERPL CALC-SCNC: 13 MMOL/L (ref 8–16)
BASOPHILS # BLD AUTO: 0.06 K/UL (ref 0–0.2)
BASOPHILS NFR BLD: 0.7 % (ref 0–1.9)
BUN SERPL-MCNC: 15 MG/DL (ref 6–20)
CALCIUM SERPL-MCNC: 9.4 MG/DL (ref 8.7–10.5)
CHLORIDE SERPL-SCNC: 96 MMOL/L (ref 95–110)
CO2 SERPL-SCNC: 30 MMOL/L (ref 23–29)
CREAT SERPL-MCNC: 1 MG/DL (ref 0.5–1.4)
DIFFERENTIAL METHOD: ABNORMAL
EOSINOPHIL # BLD AUTO: 0.3 K/UL (ref 0–0.5)
EOSINOPHIL NFR BLD: 3 % (ref 0–8)
ERYTHROCYTE [DISTWIDTH] IN BLOOD BY AUTOMATED COUNT: 14.6 % (ref 11.5–14.5)
EST. GFR  (AFRICAN AMERICAN): >60 ML/MIN/1.73 M^2
EST. GFR  (NON AFRICAN AMERICAN): >60 ML/MIN/1.73 M^2
GLUCOSE SERPL-MCNC: 118 MG/DL (ref 70–110)
GLUCOSE SERPL-MCNC: 127 MG/DL (ref 70–110)
GLUCOSE SERPL-MCNC: 134 MG/DL (ref 70–110)
GLUCOSE SERPL-MCNC: 143 MG/DL (ref 70–110)
GLUCOSE SERPL-MCNC: 149 MG/DL (ref 70–110)
GLUCOSE SERPL-MCNC: 181 MG/DL (ref 70–110)
HCT VFR BLD AUTO: 36.2 % (ref 40–54)
HGB BLD-MCNC: 11.6 G/DL (ref 14–18)
IMM GRANULOCYTES # BLD AUTO: 0.06 K/UL (ref 0–0.04)
IMM GRANULOCYTES NFR BLD AUTO: 0.7 % (ref 0–0.5)
LYMPHOCYTES # BLD AUTO: 1.5 K/UL (ref 1–4.8)
LYMPHOCYTES NFR BLD: 18.3 % (ref 18–48)
MAGNESIUM SERPL-MCNC: 1.6 MG/DL (ref 1.6–2.6)
MCH RBC QN AUTO: 30.4 PG (ref 27–31)
MCHC RBC AUTO-ENTMCNC: 32 G/DL (ref 32–36)
MCV RBC AUTO: 95 FL (ref 82–98)
MONOCYTES # BLD AUTO: 0.8 K/UL (ref 0.3–1)
MONOCYTES NFR BLD: 10.1 % (ref 4–15)
NEUTROPHILS # BLD AUTO: 5.6 K/UL (ref 1.8–7.7)
NEUTROPHILS NFR BLD: 67.2 % (ref 38–73)
NRBC BLD-RTO: 0 /100 WBC
PLATELET # BLD AUTO: 222 K/UL (ref 150–450)
PMV BLD AUTO: 9.9 FL (ref 9.2–12.9)
POTASSIUM SERPL-SCNC: 3.4 MMOL/L (ref 3.5–5.1)
RBC # BLD AUTO: 3.82 M/UL (ref 4.6–6.2)
SODIUM SERPL-SCNC: 139 MMOL/L (ref 136–145)
VANCOMYCIN TROUGH SERPL-MCNC: 11.8 UG/ML
WBC # BLD AUTO: 8.3 K/UL (ref 3.9–12.7)

## 2021-09-13 PROCEDURE — 63600175 PHARM REV CODE 636 W HCPCS: Performed by: NURSE PRACTITIONER

## 2021-09-13 PROCEDURE — 27201423 OPTIME MED/SURG SUP & DEVICES STERILE SUPPLY: Performed by: PODIATRIST

## 2021-09-13 PROCEDURE — 25000003 PHARM REV CODE 250: Performed by: INTERNAL MEDICINE

## 2021-09-13 PROCEDURE — 99900031 HC PATIENT EDUCATION (STAT)

## 2021-09-13 PROCEDURE — 94799 UNLISTED PULMONARY SVC/PX: CPT

## 2021-09-13 PROCEDURE — 37000009 HC ANESTHESIA EA ADD 15 MINS: Performed by: PODIATRIST

## 2021-09-13 PROCEDURE — 80048 BASIC METABOLIC PNL TOTAL CA: CPT | Performed by: NURSE PRACTITIONER

## 2021-09-13 PROCEDURE — 25000003 PHARM REV CODE 250: Performed by: PODIATRIST

## 2021-09-13 PROCEDURE — 25000003 PHARM REV CODE 250: Performed by: NURSE PRACTITIONER

## 2021-09-13 PROCEDURE — 36415 COLL VENOUS BLD VENIPUNCTURE: CPT | Performed by: NURSE PRACTITIONER

## 2021-09-13 PROCEDURE — 85025 COMPLETE CBC W/AUTO DIFF WBC: CPT | Performed by: NURSE PRACTITIONER

## 2021-09-13 PROCEDURE — 27000671 HC TUBING MICROBORE EXT: Performed by: STUDENT IN AN ORGANIZED HEALTH CARE EDUCATION/TRAINING PROGRAM

## 2021-09-13 PROCEDURE — 99223 1ST HOSP IP/OBS HIGH 75: CPT | Mod: ,,, | Performed by: INTERNAL MEDICINE

## 2021-09-13 PROCEDURE — 83735 ASSAY OF MAGNESIUM: CPT | Performed by: NURSE PRACTITIONER

## 2021-09-13 PROCEDURE — 71000033 HC RECOVERY, INTIAL HOUR: Performed by: PODIATRIST

## 2021-09-13 PROCEDURE — 10061 PR DRAIN SKIN ABSCESS COMPLIC: ICD-10-PCS | Mod: RT,,, | Performed by: PODIATRIST

## 2021-09-13 PROCEDURE — 94760 N-INVAS EAR/PLS OXIMETRY 1: CPT

## 2021-09-13 PROCEDURE — 10061 I&D ABSCESS COMP/MULTIPLE: CPT | Mod: RT,,, | Performed by: PODIATRIST

## 2021-09-13 PROCEDURE — 63600175 PHARM REV CODE 636 W HCPCS: Performed by: INTERNAL MEDICINE

## 2021-09-13 PROCEDURE — 36415 COLL VENOUS BLD VENIPUNCTURE: CPT | Performed by: INTERNAL MEDICINE

## 2021-09-13 PROCEDURE — 80202 ASSAY OF VANCOMYCIN: CPT | Performed by: INTERNAL MEDICINE

## 2021-09-13 PROCEDURE — 99223 PR INITIAL HOSPITAL CARE,LEVL III: ICD-10-PCS | Mod: ,,, | Performed by: INTERNAL MEDICINE

## 2021-09-13 PROCEDURE — 63600175 PHARM REV CODE 636 W HCPCS: Performed by: NURSE ANESTHETIST, CERTIFIED REGISTERED

## 2021-09-13 PROCEDURE — 12000002 HC ACUTE/MED SURGE SEMI-PRIVATE ROOM

## 2021-09-13 PROCEDURE — 37000008 HC ANESTHESIA 1ST 15 MINUTES: Performed by: PODIATRIST

## 2021-09-13 PROCEDURE — 27000284 HC CANNULA NASAL: Performed by: STUDENT IN AN ORGANIZED HEALTH CARE EDUCATION/TRAINING PROGRAM

## 2021-09-13 PROCEDURE — 27000080 OPTIME MED/SURG SUP & DEVICES GENERAL CLASSIFICATION: Performed by: PODIATRIST

## 2021-09-13 PROCEDURE — 36000705 HC OR TIME LEV I EA ADD 15 MIN: Performed by: PODIATRIST

## 2021-09-13 PROCEDURE — 36000704 HC OR TIME LEV I 1ST 15 MIN: Performed by: PODIATRIST

## 2021-09-13 PROCEDURE — 27000673 HC TUBING BLOOD Y: Performed by: STUDENT IN AN ORGANIZED HEALTH CARE EDUCATION/TRAINING PROGRAM

## 2021-09-13 PROCEDURE — 99900035 HC TECH TIME PER 15 MIN (STAT)

## 2021-09-13 RX ORDER — ONDANSETRON 2 MG/ML
INJECTION INTRAMUSCULAR; INTRAVENOUS
Status: DISCONTINUED | OUTPATIENT
Start: 2021-09-13 | End: 2021-09-13

## 2021-09-13 RX ORDER — HYDROCODONE BITARTRATE AND ACETAMINOPHEN 10; 325 MG/1; MG/1
1 TABLET ORAL EVERY 4 HOURS PRN
Status: DISCONTINUED | OUTPATIENT
Start: 2021-09-13 | End: 2021-09-14 | Stop reason: HOSPADM

## 2021-09-13 RX ORDER — TRAMADOL HYDROCHLORIDE 50 MG/1
50 TABLET ORAL EVERY 4 HOURS PRN
Status: DISCONTINUED | OUTPATIENT
Start: 2021-09-13 | End: 2021-09-13

## 2021-09-13 RX ORDER — HYDROCODONE BITARTRATE AND ACETAMINOPHEN 5; 325 MG/1; MG/1
1 TABLET ORAL EVERY 4 HOURS PRN
Status: DISCONTINUED | OUTPATIENT
Start: 2021-09-13 | End: 2021-09-14 | Stop reason: HOSPADM

## 2021-09-13 RX ORDER — ONDANSETRON 4 MG/1
8 TABLET, ORALLY DISINTEGRATING ORAL EVERY 8 HOURS PRN
Status: DISCONTINUED | OUTPATIENT
Start: 2021-09-13 | End: 2021-09-14 | Stop reason: HOSPADM

## 2021-09-13 RX ORDER — TRAMADOL HYDROCHLORIDE 50 MG/1
50 TABLET ORAL EVERY 4 HOURS PRN
Status: DISCONTINUED | OUTPATIENT
Start: 2021-09-13 | End: 2021-09-14 | Stop reason: HOSPADM

## 2021-09-13 RX ORDER — FENTANYL CITRATE 50 UG/ML
INJECTION, SOLUTION INTRAMUSCULAR; INTRAVENOUS
Status: DISCONTINUED | OUTPATIENT
Start: 2021-09-13 | End: 2021-09-13

## 2021-09-13 RX ORDER — MORPHINE SULFATE 4 MG/ML
4 INJECTION, SOLUTION INTRAMUSCULAR; INTRAVENOUS EVERY 4 HOURS PRN
Status: DISCONTINUED | OUTPATIENT
Start: 2021-09-13 | End: 2021-09-14 | Stop reason: HOSPADM

## 2021-09-13 RX ORDER — ONDANSETRON 2 MG/ML
4 INJECTION INTRAMUSCULAR; INTRAVENOUS EVERY 8 HOURS PRN
Status: DISCONTINUED | OUTPATIENT
Start: 2021-09-13 | End: 2021-09-13

## 2021-09-13 RX ORDER — BUPIVACAINE HYDROCHLORIDE 5 MG/ML
INJECTION, SOLUTION EPIDURAL; INTRACAUDAL
Status: DISCONTINUED | OUTPATIENT
Start: 2021-09-13 | End: 2021-09-13 | Stop reason: HOSPADM

## 2021-09-13 RX ORDER — PROMETHAZINE HYDROCHLORIDE 25 MG/1
25 TABLET ORAL EVERY 6 HOURS PRN
Status: DISCONTINUED | OUTPATIENT
Start: 2021-09-13 | End: 2021-09-14 | Stop reason: HOSPADM

## 2021-09-13 RX ADMIN — ALLOPURINOL 300 MG: 300 TABLET ORAL at 08:09

## 2021-09-13 RX ADMIN — POTASSIUM CHLORIDE 40 MEQ: 20 TABLET, EXTENDED RELEASE ORAL at 08:09

## 2021-09-13 RX ADMIN — MEROPENEM AND SODIUM CHLORIDE 1 G: 1 INJECTION, SOLUTION INTRAVENOUS at 08:09

## 2021-09-13 RX ADMIN — PANTOPRAZOLE SODIUM 40 MG: 40 TABLET, DELAYED RELEASE ORAL at 05:09

## 2021-09-13 RX ADMIN — ENOXAPARIN SODIUM 40 MG: 40 INJECTION SUBCUTANEOUS at 08:09

## 2021-09-13 RX ADMIN — MAGNESIUM OXIDE 800 MG: 400 TABLET ORAL at 08:09

## 2021-09-13 RX ADMIN — AMLODIPINE BESYLATE 10 MG: 5 TABLET ORAL at 08:09

## 2021-09-13 RX ADMIN — MEROPENEM AND SODIUM CHLORIDE 1 G: 1 INJECTION, SOLUTION INTRAVENOUS at 12:09

## 2021-09-13 RX ADMIN — VALSARTAN 320 MG: 80 TABLET, FILM COATED ORAL at 08:09

## 2021-09-13 RX ADMIN — ONDANSETRON 4 MG: 2 INJECTION INTRAMUSCULAR; INTRAVENOUS at 12:09

## 2021-09-13 RX ADMIN — SENNOSIDES AND DOCUSATE SODIUM 1 TABLET: 8.6; 5 TABLET ORAL at 08:09

## 2021-09-13 RX ADMIN — VANCOMYCIN HYDROCHLORIDE 2000 MG: 500 INJECTION, POWDER, LYOPHILIZED, FOR SOLUTION INTRAVENOUS at 04:09

## 2021-09-13 RX ADMIN — SODIUM CHLORIDE, SODIUM LACTATE, POTASSIUM CHLORIDE, AND CALCIUM CHLORIDE: .6; .31; .03; .02 INJECTION, SOLUTION INTRAVENOUS at 12:09

## 2021-09-13 RX ADMIN — VANCOMYCIN HYDROCHLORIDE 2000 MG: 500 INJECTION, POWDER, LYOPHILIZED, FOR SOLUTION INTRAVENOUS at 02:09

## 2021-09-13 RX ADMIN — FENTANYL CITRATE 100 MCG: 50 INJECTION INTRAMUSCULAR; INTRAVENOUS at 12:09

## 2021-09-13 RX ADMIN — CLONIDINE HYDROCHLORIDE 0.1 MG: 0.1 TABLET ORAL at 08:09

## 2021-09-13 RX ADMIN — HYDROCODONE BITARTRATE AND ACETAMINOPHEN 1 TABLET: 5; 325 TABLET ORAL at 08:09

## 2021-09-13 RX ADMIN — FUROSEMIDE 20 MG: 20 TABLET ORAL at 08:09

## 2021-09-13 RX ADMIN — HYDROCODONE BITARTRATE AND ACETAMINOPHEN 1 TABLET: 10; 325 TABLET ORAL at 08:09

## 2021-09-13 RX ADMIN — ATENOLOL 100 MG: 25 TABLET ORAL at 08:09

## 2021-09-14 VITALS
SYSTOLIC BLOOD PRESSURE: 122 MMHG | BODY MASS INDEX: 44.1 KG/M2 | TEMPERATURE: 98 F | WEIGHT: 315 LBS | RESPIRATION RATE: 18 BRPM | HEART RATE: 78 BPM | HEIGHT: 71 IN | DIASTOLIC BLOOD PRESSURE: 72 MMHG | OXYGEN SATURATION: 95 %

## 2021-09-14 LAB
ANION GAP SERPL CALC-SCNC: 12 MMOL/L (ref 8–16)
BACTERIA SPEC AEROBE CULT: ABNORMAL
BACTERIA SPEC ANAEROBE CULT: NORMAL
BASOPHILS # BLD AUTO: 0.07 K/UL (ref 0–0.2)
BASOPHILS NFR BLD: 0.8 % (ref 0–1.9)
BUN SERPL-MCNC: 14 MG/DL (ref 6–20)
CALCIUM SERPL-MCNC: 9.6 MG/DL (ref 8.7–10.5)
CHLORIDE SERPL-SCNC: 98 MMOL/L (ref 95–110)
CO2 SERPL-SCNC: 31 MMOL/L (ref 23–29)
CREAT SERPL-MCNC: 0.9 MG/DL (ref 0.5–1.4)
CRP SERPL-MCNC: 8.86 MG/DL
DIFFERENTIAL METHOD: ABNORMAL
EOSINOPHIL # BLD AUTO: 0.3 K/UL (ref 0–0.5)
EOSINOPHIL NFR BLD: 3.1 % (ref 0–8)
ERYTHROCYTE [DISTWIDTH] IN BLOOD BY AUTOMATED COUNT: 14.6 % (ref 11.5–14.5)
EST. GFR  (AFRICAN AMERICAN): >60 ML/MIN/1.73 M^2
EST. GFR  (NON AFRICAN AMERICAN): >60 ML/MIN/1.73 M^2
GLUCOSE SERPL-MCNC: 124 MG/DL (ref 70–110)
GLUCOSE SERPL-MCNC: 128 MG/DL (ref 70–110)
GLUCOSE SERPL-MCNC: 133 MG/DL (ref 70–110)
HCT VFR BLD AUTO: 36.5 % (ref 40–54)
HGB BLD-MCNC: 11.7 G/DL (ref 14–18)
IMM GRANULOCYTES # BLD AUTO: 0.08 K/UL (ref 0–0.04)
IMM GRANULOCYTES NFR BLD AUTO: 0.9 % (ref 0–0.5)
LYMPHOCYTES # BLD AUTO: 1.6 K/UL (ref 1–4.8)
LYMPHOCYTES NFR BLD: 18.7 % (ref 18–48)
MAGNESIUM SERPL-MCNC: 1.7 MG/DL (ref 1.6–2.6)
MCH RBC QN AUTO: 30.5 PG (ref 27–31)
MCHC RBC AUTO-ENTMCNC: 32.1 G/DL (ref 32–36)
MCV RBC AUTO: 95 FL (ref 82–98)
MONOCYTES # BLD AUTO: 0.9 K/UL (ref 0.3–1)
MONOCYTES NFR BLD: 10.8 % (ref 4–15)
NEUTROPHILS # BLD AUTO: 5.6 K/UL (ref 1.8–7.7)
NEUTROPHILS NFR BLD: 65.7 % (ref 38–73)
NRBC BLD-RTO: 0 /100 WBC
PLATELET # BLD AUTO: 254 K/UL (ref 150–450)
PMV BLD AUTO: 9.9 FL (ref 9.2–12.9)
POTASSIUM SERPL-SCNC: 3.8 MMOL/L (ref 3.5–5.1)
RBC # BLD AUTO: 3.84 M/UL (ref 4.6–6.2)
SODIUM SERPL-SCNC: 141 MMOL/L (ref 136–145)
VANCOMYCIN TROUGH SERPL-MCNC: 10.8 UG/ML
WBC # BLD AUTO: 8.52 K/UL (ref 3.9–12.7)

## 2021-09-14 PROCEDURE — 25000003 PHARM REV CODE 250: Performed by: NURSE PRACTITIONER

## 2021-09-14 PROCEDURE — 80048 BASIC METABOLIC PNL TOTAL CA: CPT | Performed by: NURSE PRACTITIONER

## 2021-09-14 PROCEDURE — 80202 ASSAY OF VANCOMYCIN: CPT | Performed by: INTERNAL MEDICINE

## 2021-09-14 PROCEDURE — 63600175 PHARM REV CODE 636 W HCPCS: Performed by: INTERNAL MEDICINE

## 2021-09-14 PROCEDURE — 25000003 PHARM REV CODE 250: Performed by: INTERNAL MEDICINE

## 2021-09-14 PROCEDURE — 99900035 HC TECH TIME PER 15 MIN (STAT)

## 2021-09-14 PROCEDURE — 83735 ASSAY OF MAGNESIUM: CPT | Performed by: NURSE PRACTITIONER

## 2021-09-14 PROCEDURE — 25000003 PHARM REV CODE 250: Performed by: PODIATRIST

## 2021-09-14 PROCEDURE — 36415 COLL VENOUS BLD VENIPUNCTURE: CPT | Performed by: NURSE PRACTITIONER

## 2021-09-14 PROCEDURE — 85025 COMPLETE CBC W/AUTO DIFF WBC: CPT | Performed by: NURSE PRACTITIONER

## 2021-09-14 PROCEDURE — 36415 COLL VENOUS BLD VENIPUNCTURE: CPT | Performed by: INTERNAL MEDICINE

## 2021-09-14 PROCEDURE — 63600175 PHARM REV CODE 636 W HCPCS: Performed by: NURSE PRACTITIONER

## 2021-09-14 PROCEDURE — 86140 C-REACTIVE PROTEIN: CPT | Performed by: INTERNAL MEDICINE

## 2021-09-14 RX ORDER — ATENOLOL 100 MG/1
100 TABLET ORAL DAILY
Qty: 90 TABLET | Refills: 0 | Status: SHIPPED | OUTPATIENT
Start: 2021-09-15 | End: 2022-10-14 | Stop reason: DRUGHIGH

## 2021-09-14 RX ORDER — CLONIDINE HYDROCHLORIDE 0.1 MG/1
0.1 TABLET ORAL 2 TIMES DAILY
Qty: 180 TABLET | Refills: 0 | Status: SHIPPED | OUTPATIENT
Start: 2021-09-14 | End: 2023-01-26

## 2021-09-14 RX ORDER — DOXYCYCLINE 100 MG/1
100 CAPSULE ORAL EVERY 12 HOURS
Qty: 28 CAPSULE | Refills: 0 | Status: SHIPPED | OUTPATIENT
Start: 2021-09-14 | End: 2021-09-28

## 2021-09-14 RX ORDER — HYDROCODONE BITARTRATE AND ACETAMINOPHEN 10; 325 MG/1; MG/1
1 TABLET ORAL EVERY 4 HOURS PRN
Qty: 10 TABLET | Refills: 0 | Status: SHIPPED | OUTPATIENT
Start: 2021-09-14 | End: 2022-05-27

## 2021-09-14 RX ADMIN — FUROSEMIDE 20 MG: 20 TABLET ORAL at 09:09

## 2021-09-14 RX ADMIN — ATENOLOL 100 MG: 25 TABLET ORAL at 09:09

## 2021-09-14 RX ADMIN — HYDROCODONE BITARTRATE AND ACETAMINOPHEN 1 TABLET: 10; 325 TABLET ORAL at 09:09

## 2021-09-14 RX ADMIN — PANTOPRAZOLE SODIUM 40 MG: 40 TABLET, DELAYED RELEASE ORAL at 05:09

## 2021-09-14 RX ADMIN — ALLOPURINOL 300 MG: 300 TABLET ORAL at 09:09

## 2021-09-14 RX ADMIN — VALSARTAN 320 MG: 80 TABLET, FILM COATED ORAL at 09:09

## 2021-09-14 RX ADMIN — CLONIDINE HYDROCHLORIDE 0.1 MG: 0.1 TABLET ORAL at 09:09

## 2021-09-14 RX ADMIN — MAGNESIUM OXIDE 800 MG: 400 TABLET ORAL at 09:09

## 2021-09-14 RX ADMIN — ENOXAPARIN SODIUM 40 MG: 40 INJECTION SUBCUTANEOUS at 09:09

## 2021-09-14 RX ADMIN — AMLODIPINE BESYLATE 10 MG: 5 TABLET ORAL at 09:09

## 2021-09-14 RX ADMIN — VANCOMYCIN HYDROCHLORIDE 2000 MG: 500 INJECTION, POWDER, LYOPHILIZED, FOR SOLUTION INTRAVENOUS at 01:09

## 2021-09-14 RX ADMIN — POTASSIUM CHLORIDE 20 MEQ: 20 TABLET, EXTENDED RELEASE ORAL at 09:09

## 2021-09-15 ENCOUNTER — TELEPHONE (OUTPATIENT)
Dept: FAMILY MEDICINE | Facility: CLINIC | Age: 56
End: 2021-09-15

## 2021-09-16 ENCOUNTER — PATIENT OUTREACH (OUTPATIENT)
Dept: FAMILY MEDICINE | Facility: CLINIC | Age: 56
End: 2021-09-16

## 2021-09-16 LAB
BACTERIA BLD CULT: NORMAL
BACTERIA BLD CULT: NORMAL

## 2021-09-17 ENCOUNTER — HOSPITAL ENCOUNTER (OUTPATIENT)
Dept: RADIOLOGY | Facility: HOSPITAL | Age: 56
Discharge: HOME | End: 2021-09-17
Attending: NURSE PRACTITIONER
Payer: COMMERCIAL

## 2021-09-17 ENCOUNTER — OFFICE VISIT (OUTPATIENT)
Dept: FAMILY MEDICINE | Facility: CLINIC | Age: 56
End: 2021-09-17
Payer: COMMERCIAL

## 2021-09-17 VITALS
WEIGHT: 315 LBS | HEIGHT: 71 IN | SYSTOLIC BLOOD PRESSURE: 150 MMHG | HEART RATE: 100 BPM | TEMPERATURE: 98 F | DIASTOLIC BLOOD PRESSURE: 84 MMHG | BODY MASS INDEX: 44.1 KG/M2 | OXYGEN SATURATION: 96 %

## 2021-09-17 DIAGNOSIS — E11.9 TYPE 2 DIABETES MELLITUS TREATED WITHOUT INSULIN: ICD-10-CM

## 2021-09-17 DIAGNOSIS — M25.561 ACUTE PAIN OF RIGHT KNEE: ICD-10-CM

## 2021-09-17 DIAGNOSIS — L08.9 DIABETIC FOOT INFECTION: ICD-10-CM

## 2021-09-17 DIAGNOSIS — L03.115 CELLULITIS OF RIGHT LOWER EXTREMITY: ICD-10-CM

## 2021-09-17 DIAGNOSIS — E11.628 DIABETIC FOOT INFECTION: ICD-10-CM

## 2021-09-17 DIAGNOSIS — Z09 HOSPITAL DISCHARGE FOLLOW-UP: Primary | ICD-10-CM

## 2021-09-17 PROCEDURE — 4010F PR ACE/ARB THEARPY RXD/TAKEN: ICD-10-PCS | Mod: S$GLB,,, | Performed by: NURSE PRACTITIONER

## 2021-09-17 PROCEDURE — 1111F PR DISCHARGE MEDS RECONCILED W/ CURRENT OUTPATIENT MED LIST: ICD-10-PCS | Mod: S$GLB,,, | Performed by: NURSE PRACTITIONER

## 2021-09-17 PROCEDURE — 3008F PR BODY MASS INDEX (BMI) DOCUMENTED: ICD-10-PCS | Mod: S$GLB,,, | Performed by: NURSE PRACTITIONER

## 2021-09-17 PROCEDURE — 73560 X-RAY EXAM OF KNEE 1 OR 2: CPT | Mod: TC,PO,RT

## 2021-09-17 PROCEDURE — 3051F PR MOST RECENT HEMOGLOBIN A1C LEVEL 7.0 - < 8.0%: ICD-10-PCS | Mod: S$GLB,,, | Performed by: NURSE PRACTITIONER

## 2021-09-17 PROCEDURE — 3008F BODY MASS INDEX DOCD: CPT | Mod: S$GLB,,, | Performed by: NURSE PRACTITIONER

## 2021-09-17 PROCEDURE — 99214 PR OFFICE/OUTPT VISIT, EST, LEVL IV, 30-39 MIN: ICD-10-PCS | Mod: 25,S$GLB,, | Performed by: NURSE PRACTITIONER

## 2021-09-17 PROCEDURE — 3051F HG A1C>EQUAL 7.0%<8.0%: CPT | Mod: S$GLB,,, | Performed by: NURSE PRACTITIONER

## 2021-09-17 PROCEDURE — 1111F DSCHRG MED/CURRENT MED MERGE: CPT | Mod: S$GLB,,, | Performed by: NURSE PRACTITIONER

## 2021-09-17 PROCEDURE — 4010F ACE/ARB THERAPY RXD/TAKEN: CPT | Mod: S$GLB,,, | Performed by: NURSE PRACTITIONER

## 2021-09-17 PROCEDURE — 96372 PR INJECTION,THERAP/PROPH/DIAG2ST, IM OR SUBCUT: ICD-10-PCS | Mod: S$GLB,,, | Performed by: NURSE PRACTITIONER

## 2021-09-17 PROCEDURE — 99214 OFFICE O/P EST MOD 30 MIN: CPT | Mod: 25,S$GLB,, | Performed by: NURSE PRACTITIONER

## 2021-09-17 PROCEDURE — 1160F PR REVIEW ALL MEDS BY PRESCRIBER/CLIN PHARMACIST DOCUMENTED: ICD-10-PCS | Mod: S$GLB,,, | Performed by: NURSE PRACTITIONER

## 2021-09-17 PROCEDURE — 1160F RVW MEDS BY RX/DR IN RCRD: CPT | Mod: S$GLB,,, | Performed by: NURSE PRACTITIONER

## 2021-09-17 PROCEDURE — 96372 THER/PROPH/DIAG INJ SC/IM: CPT | Mod: S$GLB,,, | Performed by: NURSE PRACTITIONER

## 2021-09-17 RX ORDER — KETOROLAC TROMETHAMINE 30 MG/ML
60 INJECTION, SOLUTION INTRAMUSCULAR; INTRAVENOUS
Status: COMPLETED | OUTPATIENT
Start: 2021-09-17 | End: 2021-09-17

## 2021-09-17 RX ORDER — MELOXICAM 15 MG/1
15 TABLET ORAL DAILY
Qty: 7 TABLET | Refills: 0 | Status: SHIPPED | OUTPATIENT
Start: 2021-09-17 | End: 2021-09-17 | Stop reason: ALTCHOICE

## 2021-09-17 RX ADMIN — KETOROLAC TROMETHAMINE 60 MG: 30 INJECTION, SOLUTION INTRAMUSCULAR; INTRAVENOUS at 11:09

## 2021-09-20 ENCOUNTER — TELEPHONE (OUTPATIENT)
Dept: FAMILY MEDICINE | Facility: CLINIC | Age: 56
End: 2021-09-20

## 2021-09-22 ENCOUNTER — OFFICE VISIT (OUTPATIENT)
Dept: ORTHOPEDICS | Facility: CLINIC | Age: 56
End: 2021-09-22

## 2021-09-22 VITALS — HEIGHT: 71 IN | BODY MASS INDEX: 44.1 KG/M2 | WEIGHT: 315 LBS

## 2021-09-22 DIAGNOSIS — M17.11 PRIMARY OSTEOARTHRITIS OF RIGHT KNEE: Primary | ICD-10-CM

## 2021-09-22 PROCEDURE — 3008F PR BODY MASS INDEX (BMI) DOCUMENTED: ICD-10-PCS | Mod: S$GLB,,, | Performed by: PHYSICIAN ASSISTANT

## 2021-09-22 PROCEDURE — 4010F ACE/ARB THERAPY RXD/TAKEN: CPT | Mod: S$GLB,,, | Performed by: PHYSICIAN ASSISTANT

## 2021-09-22 PROCEDURE — 3008F BODY MASS INDEX DOCD: CPT | Mod: S$GLB,,, | Performed by: PHYSICIAN ASSISTANT

## 2021-09-22 PROCEDURE — 1111F PR DISCHARGE MEDS RECONCILED W/ CURRENT OUTPATIENT MED LIST: ICD-10-PCS | Mod: S$GLB,,, | Performed by: PHYSICIAN ASSISTANT

## 2021-09-22 PROCEDURE — 99203 PR OFFICE/OUTPT VISIT, NEW, LEVL III, 30-44 MIN: ICD-10-PCS | Mod: 25,S$GLB,, | Performed by: PHYSICIAN ASSISTANT

## 2021-09-22 PROCEDURE — 99203 OFFICE O/P NEW LOW 30 MIN: CPT | Mod: 25,S$GLB,, | Performed by: PHYSICIAN ASSISTANT

## 2021-09-22 PROCEDURE — 1111F DSCHRG MED/CURRENT MED MERGE: CPT | Mod: S$GLB,,, | Performed by: PHYSICIAN ASSISTANT

## 2021-09-22 PROCEDURE — 20610 LARGE JOINT ASPIRATION/INJECTION: R KNEE: ICD-10-PCS | Mod: RT,S$GLB,, | Performed by: PHYSICIAN ASSISTANT

## 2021-09-22 PROCEDURE — 1160F PR REVIEW ALL MEDS BY PRESCRIBER/CLIN PHARMACIST DOCUMENTED: ICD-10-PCS | Mod: S$GLB,,, | Performed by: PHYSICIAN ASSISTANT

## 2021-09-22 PROCEDURE — 4010F PR ACE/ARB THEARPY RXD/TAKEN: ICD-10-PCS | Mod: S$GLB,,, | Performed by: PHYSICIAN ASSISTANT

## 2021-09-22 PROCEDURE — 3051F PR MOST RECENT HEMOGLOBIN A1C LEVEL 7.0 - < 8.0%: ICD-10-PCS | Mod: S$GLB,,, | Performed by: PHYSICIAN ASSISTANT

## 2021-09-22 PROCEDURE — 1160F RVW MEDS BY RX/DR IN RCRD: CPT | Mod: S$GLB,,, | Performed by: PHYSICIAN ASSISTANT

## 2021-09-22 PROCEDURE — 20610 DRAIN/INJ JOINT/BURSA W/O US: CPT | Mod: RT,S$GLB,, | Performed by: PHYSICIAN ASSISTANT

## 2021-09-22 PROCEDURE — 3051F HG A1C>EQUAL 7.0%<8.0%: CPT | Mod: S$GLB,,, | Performed by: PHYSICIAN ASSISTANT

## 2021-09-22 RX ORDER — METHYLPREDNISOLONE ACETATE 40 MG/ML
40 INJECTION, SUSPENSION INTRA-ARTICULAR; INTRALESIONAL; INTRAMUSCULAR; SOFT TISSUE
Status: DISCONTINUED | OUTPATIENT
Start: 2021-09-22 | End: 2021-09-22 | Stop reason: HOSPADM

## 2021-09-22 RX ADMIN — METHYLPREDNISOLONE ACETATE 40 MG: 40 INJECTION, SUSPENSION INTRA-ARTICULAR; INTRALESIONAL; INTRAMUSCULAR; SOFT TISSUE at 08:09

## 2021-10-18 DIAGNOSIS — Z91.89 RISK FOR CORONARY ARTERY DISEASE GREATER THAN 20% IN NEXT 10 YEARS PER FRAMINGHAM SCORE: ICD-10-CM

## 2021-10-18 DIAGNOSIS — E78.2 HYPERLIPIDEMIA, MIXED: ICD-10-CM

## 2021-10-18 RX ORDER — ATORVASTATIN CALCIUM 40 MG/1
40 TABLET, FILM COATED ORAL DAILY
Qty: 90 TABLET | Refills: 3 | OUTPATIENT
Start: 2021-10-18 | End: 2022-10-18

## 2021-10-27 ENCOUNTER — OFFICE VISIT (OUTPATIENT)
Dept: ORTHOPEDICS | Facility: CLINIC | Age: 56
End: 2021-10-27
Payer: COMMERCIAL

## 2021-10-27 VITALS
DIASTOLIC BLOOD PRESSURE: 99 MMHG | SYSTOLIC BLOOD PRESSURE: 168 MMHG | WEIGHT: 315 LBS | BODY MASS INDEX: 44.1 KG/M2 | HEIGHT: 71 IN

## 2021-10-27 DIAGNOSIS — M17.11 PRIMARY OSTEOARTHRITIS OF RIGHT KNEE: Primary | ICD-10-CM

## 2021-10-27 PROCEDURE — 1160F PR REVIEW ALL MEDS BY PRESCRIBER/CLIN PHARMACIST DOCUMENTED: ICD-10-PCS | Mod: S$GLB,,, | Performed by: PHYSICIAN ASSISTANT

## 2021-10-27 PROCEDURE — 3080F DIAST BP >= 90 MM HG: CPT | Mod: S$GLB,,, | Performed by: PHYSICIAN ASSISTANT

## 2021-10-27 PROCEDURE — 3051F PR MOST RECENT HEMOGLOBIN A1C LEVEL 7.0 - < 8.0%: ICD-10-PCS | Mod: S$GLB,,, | Performed by: PHYSICIAN ASSISTANT

## 2021-10-27 PROCEDURE — 3051F HG A1C>EQUAL 7.0%<8.0%: CPT | Mod: S$GLB,,, | Performed by: PHYSICIAN ASSISTANT

## 2021-10-27 PROCEDURE — 3080F PR MOST RECENT DIASTOLIC BLOOD PRESSURE >= 90 MM HG: ICD-10-PCS | Mod: S$GLB,,, | Performed by: PHYSICIAN ASSISTANT

## 2021-10-27 PROCEDURE — 20610 LARGE JOINT ASPIRATION/INJECTION: R KNEE: ICD-10-PCS | Mod: RT,S$GLB,, | Performed by: PHYSICIAN ASSISTANT

## 2021-10-27 PROCEDURE — 3077F PR MOST RECENT SYSTOLIC BLOOD PRESSURE >= 140 MM HG: ICD-10-PCS | Mod: S$GLB,,, | Performed by: PHYSICIAN ASSISTANT

## 2021-10-27 PROCEDURE — 1160F RVW MEDS BY RX/DR IN RCRD: CPT | Mod: S$GLB,,, | Performed by: PHYSICIAN ASSISTANT

## 2021-10-27 PROCEDURE — 20610 DRAIN/INJ JOINT/BURSA W/O US: CPT | Mod: RT,S$GLB,, | Performed by: PHYSICIAN ASSISTANT

## 2021-10-27 PROCEDURE — 99213 OFFICE O/P EST LOW 20 MIN: CPT | Mod: 25,S$GLB,, | Performed by: PHYSICIAN ASSISTANT

## 2021-10-27 PROCEDURE — 4010F ACE/ARB THERAPY RXD/TAKEN: CPT | Mod: S$GLB,,, | Performed by: PHYSICIAN ASSISTANT

## 2021-10-27 PROCEDURE — 3008F PR BODY MASS INDEX (BMI) DOCUMENTED: ICD-10-PCS | Mod: S$GLB,,, | Performed by: PHYSICIAN ASSISTANT

## 2021-10-27 PROCEDURE — 99213 PR OFFICE/OUTPT VISIT, EST, LEVL III, 20-29 MIN: ICD-10-PCS | Mod: 25,S$GLB,, | Performed by: PHYSICIAN ASSISTANT

## 2021-10-27 PROCEDURE — 3077F SYST BP >= 140 MM HG: CPT | Mod: S$GLB,,, | Performed by: PHYSICIAN ASSISTANT

## 2021-10-27 PROCEDURE — 4010F PR ACE/ARB THEARPY RXD/TAKEN: ICD-10-PCS | Mod: S$GLB,,, | Performed by: PHYSICIAN ASSISTANT

## 2021-10-27 PROCEDURE — 3008F BODY MASS INDEX DOCD: CPT | Mod: S$GLB,,, | Performed by: PHYSICIAN ASSISTANT

## 2021-10-27 RX ORDER — TRIAMCINOLONE ACETONIDE 40 MG/ML
40 INJECTION, SUSPENSION INTRA-ARTICULAR; INTRAMUSCULAR
Status: DISCONTINUED | OUTPATIENT
Start: 2021-10-27 | End: 2021-10-27 | Stop reason: HOSPADM

## 2021-10-27 RX ORDER — HYDROCODONE BITARTRATE AND ACETAMINOPHEN 5; 325 MG/1; MG/1
1 TABLET ORAL EVERY 8 HOURS PRN
Qty: 21 TABLET | Refills: 0 | Status: SHIPPED | OUTPATIENT
Start: 2021-10-27 | End: 2021-11-05 | Stop reason: SDUPTHER

## 2021-10-27 RX ADMIN — TRIAMCINOLONE ACETONIDE 40 MG: 40 INJECTION, SUSPENSION INTRA-ARTICULAR; INTRAMUSCULAR at 01:10

## 2021-11-05 ENCOUNTER — OFFICE VISIT (OUTPATIENT)
Dept: FAMILY MEDICINE | Facility: CLINIC | Age: 56
End: 2021-11-05
Payer: COMMERCIAL

## 2021-11-05 VITALS
OXYGEN SATURATION: 95 % | DIASTOLIC BLOOD PRESSURE: 92 MMHG | SYSTOLIC BLOOD PRESSURE: 136 MMHG | HEIGHT: 71 IN | WEIGHT: 315 LBS | HEART RATE: 99 BPM | BODY MASS INDEX: 44.1 KG/M2 | TEMPERATURE: 98 F

## 2021-11-05 DIAGNOSIS — I10 ESSENTIAL HYPERTENSION: ICD-10-CM

## 2021-11-05 DIAGNOSIS — L08.9 DIABETIC FOOT INFECTION: ICD-10-CM

## 2021-11-05 DIAGNOSIS — E78.2 HYPERLIPIDEMIA, MIXED: ICD-10-CM

## 2021-11-05 DIAGNOSIS — E11.9 TYPE 2 DIABETES MELLITUS TREATED WITHOUT INSULIN: Primary | ICD-10-CM

## 2021-11-05 DIAGNOSIS — E11.628 DIABETIC FOOT INFECTION: ICD-10-CM

## 2021-11-05 LAB — HBA1C MFR BLD: 6 %

## 2021-11-05 PROCEDURE — 83036 HEMOGLOBIN GLYCOSYLATED A1C: CPT | Mod: QW,,, | Performed by: NURSE PRACTITIONER

## 2021-11-05 PROCEDURE — 83036 POCT HEMOGLOBIN A1C: ICD-10-PCS | Mod: QW,,, | Performed by: NURSE PRACTITIONER

## 2021-11-05 PROCEDURE — 3044F HG A1C LEVEL LT 7.0%: CPT | Mod: S$GLB,,, | Performed by: NURSE PRACTITIONER

## 2021-11-05 PROCEDURE — 3080F DIAST BP >= 90 MM HG: CPT | Mod: S$GLB,,, | Performed by: NURSE PRACTITIONER

## 2021-11-05 PROCEDURE — 3008F PR BODY MASS INDEX (BMI) DOCUMENTED: ICD-10-PCS | Mod: S$GLB,,, | Performed by: NURSE PRACTITIONER

## 2021-11-05 PROCEDURE — 99214 OFFICE O/P EST MOD 30 MIN: CPT | Mod: S$GLB,,, | Performed by: NURSE PRACTITIONER

## 2021-11-05 PROCEDURE — 4010F ACE/ARB THERAPY RXD/TAKEN: CPT | Mod: S$GLB,,, | Performed by: NURSE PRACTITIONER

## 2021-11-05 PROCEDURE — 3044F PR MOST RECENT HEMOGLOBIN A1C LEVEL <7.0%: ICD-10-PCS | Mod: S$GLB,,, | Performed by: NURSE PRACTITIONER

## 2021-11-05 PROCEDURE — 3080F PR MOST RECENT DIASTOLIC BLOOD PRESSURE >= 90 MM HG: ICD-10-PCS | Mod: S$GLB,,, | Performed by: NURSE PRACTITIONER

## 2021-11-05 PROCEDURE — 1160F RVW MEDS BY RX/DR IN RCRD: CPT | Mod: S$GLB,,, | Performed by: NURSE PRACTITIONER

## 2021-11-05 PROCEDURE — 3008F BODY MASS INDEX DOCD: CPT | Mod: S$GLB,,, | Performed by: NURSE PRACTITIONER

## 2021-11-05 PROCEDURE — 4010F PR ACE/ARB THEARPY RXD/TAKEN: ICD-10-PCS | Mod: S$GLB,,, | Performed by: NURSE PRACTITIONER

## 2021-11-05 PROCEDURE — 3075F SYST BP GE 130 - 139MM HG: CPT | Mod: S$GLB,,, | Performed by: NURSE PRACTITIONER

## 2021-11-05 PROCEDURE — 99214 PR OFFICE/OUTPT VISIT, EST, LEVL IV, 30-39 MIN: ICD-10-PCS | Mod: S$GLB,,, | Performed by: NURSE PRACTITIONER

## 2021-11-05 PROCEDURE — 3075F PR MOST RECENT SYSTOLIC BLOOD PRESS GE 130-139MM HG: ICD-10-PCS | Mod: S$GLB,,, | Performed by: NURSE PRACTITIONER

## 2021-11-05 PROCEDURE — 1160F PR REVIEW ALL MEDS BY PRESCRIBER/CLIN PHARMACIST DOCUMENTED: ICD-10-PCS | Mod: S$GLB,,, | Performed by: NURSE PRACTITIONER

## 2021-11-05 RX ORDER — METFORMIN HYDROCHLORIDE 500 MG/1
500 TABLET, EXTENDED RELEASE ORAL
Qty: 90 TABLET | Refills: 1 | Status: SHIPPED | OUTPATIENT
Start: 2021-11-05 | End: 2022-05-27 | Stop reason: SINTOL

## 2021-11-05 RX ORDER — ATORVASTATIN CALCIUM 40 MG/1
40 TABLET, FILM COATED ORAL DAILY
Qty: 90 TABLET | Refills: 0 | Status: SHIPPED | OUTPATIENT
Start: 2021-11-05 | End: 2022-01-31

## 2021-12-31 ENCOUNTER — PATIENT MESSAGE (OUTPATIENT)
Dept: FAMILY MEDICINE | Facility: CLINIC | Age: 56
End: 2021-12-31
Payer: COMMERCIAL

## 2022-01-03 RX ORDER — FUROSEMIDE 20 MG/1
20 TABLET ORAL DAILY
Qty: 90 TABLET | Refills: 0 | Status: SHIPPED | OUTPATIENT
Start: 2022-01-03

## 2022-05-26 NOTE — PROGRESS NOTES
SUBJECTIVE:      Patient ID: Be Shipman is a 56 y.o. male.    Chief Complaint: Hypertension    Mr Shipman is here today to f/u on hyperlipidemia, htn, dm and review labs. He is following with cardiology Dr Rust. He is not taking metform because it made him sick to his stomach.       Hypertension  This is a chronic problem. The current episode started more than 1 year ago. The problem is unchanged. The problem is controlled. Associated symptoms include peripheral edema. Pertinent negatives include no anxiety, chest pain, headaches, neck pain, palpitations or shortness of breath. Risk factors for coronary artery disease include male gender, obesity and dyslipidemia. Past treatments include calcium channel blockers, beta blockers, diuretics and angiotensin blockers. The current treatment provides moderate improvement.   Hyperlipidemia  This is a chronic problem. The current episode started more than 1 year ago. The problem is controlled. Recent lipid tests were reviewed and are normal. Pertinent negatives include no chest pain or shortness of breath. Current antihyperlipidemic treatment includes statins.   Diabetes  He presents for his follow-up diabetic visit. He has type 2 diabetes mellitus. His disease course has been stable. There are no hypoglycemic associated symptoms. Pertinent negatives for hypoglycemia include no confusion, headaches, nervousness/anxiousness, pallor, seizures or speech difficulty. There are no diabetic associated symptoms. Pertinent negatives for diabetes include no chest pain and no polyphagia. There are no hypoglycemic complications. Symptoms are stable. There are no diabetic complications. Risk factors for coronary artery disease include diabetes mellitus, dyslipidemia, male sex, hypertension, obesity and tobacco exposure. He is compliant with treatment most of the time. When asked about meal planning, he reported none. An ACE inhibitor/angiotensin II receptor blocker is being  taken. He does not see a podiatrist.Eye exam is not current.       Past Surgical History:   Procedure Laterality Date    HAND SURGERY Left     22 y/o, laceration repair    HERNIA REPAIR      umbilical hernia    INCISION AND DRAINAGE FOOT Right 9/13/2021    Procedure: INCISION AND DRAINAGE, FOOT;  Surgeon: Juan Jose Julio DPM;  Location: [facility];  Service: Podiatry;  Laterality: Right;    left breast tumor removed (benign)       LEFT HEART CATHETERIZATION Left 8/9/2019    Procedure: Left heart cath;  Surgeon: Erik Jessica MD;  Location: [facility] CATH;  Service: Cardiology;  Laterality: Left;     Family History   Problem Relation Age of Onset    Heart disease Mother         CAD    Hypertension Mother     Cancer Sister         breast    Heart disease Sister 50    Heart disease Brother         MI    Stroke Brother     Heart disease Brother         MI    Heart disease Brother         MI      Social History     Socioeconomic History    Marital status:    Tobacco Use    Smoking status: Former Smoker    Smokeless tobacco: Former User     Quit date: 7/22/2000    Tobacco comment: quit 12 years ago   Substance and Sexual Activity    Alcohol use: Yes     Comment: every day 1/2 a fifth of whiskey per day    Drug use: No    Sexual activity: Yes     Current Outpatient Medications   Medication Sig Dispense Refill    allopurinoL (ZYLOPRIM) 300 MG tablet TAKE 1 TABLET BY MOUTH EVERY DAY 90 tablet 0    amLODIPine (NORVASC) 10 MG tablet Take 1 tablet (10 mg total) by mouth once daily. 90 tablet 3    atenoloL (TENORMIN) 100 MG tablet Take 1 tablet (100 mg total) by mouth once daily. 90 tablet 0    atenoloL-chlorthalidone (TENORETIC) 100-25 mg per tablet Take 1 tablet by mouth once daily.      atorvastatin (LIPITOR) 40 MG tablet TAKE 1 TABLET BY MOUTH EVERY DAY 90 tablet 0    cloNIDine (CATAPRES) 0.1 MG tablet Take 1 tablet (0.1 mg total) by mouth 2 (two) times daily. 180 tablet 0    clopidogreL  (PLAVIX) 75 mg tablet TAKE 1 TABLET BY MOUTH EVERY DAY 90 tablet 3    esomeprazole (NEXIUM) 40 MG capsule Take 40 mg by mouth before breakfast.      furosemide (LASIX) 20 MG tablet Take 1 tablet (20 mg total) by mouth once daily. 90 tablet 0    nitroGLYCERIN (NITROSTAT) 0.4 MG SL tablet Place 1 tablet (0.4 mg total) under the tongue every 5 (five) minutes as needed for Chest pain. 25 tablet 2    olmesartan (BENICAR) 40 MG tablet Take 1 tablet (40 mg total) by mouth once daily. 90 tablet 3    potassium chloride SA (K-DUR,KLOR-CON) 20 MEQ tablet TAKE 1 TABLET BY MOUTH EVERY DAY 90 tablet 0     No current facility-administered medications for this visit.     Review of patient's allergies indicates:   Allergen Reactions    Tetanus vaccines and toxoid Anaphylaxis, Hives and Rash      Past Medical History:   Diagnosis Date    Hyperlipidemia     Hypertension      Past Surgical History:   Procedure Laterality Date    HAND SURGERY Left     20 y/o, laceration repair    HERNIA REPAIR      umbilical hernia    INCISION AND DRAINAGE FOOT Right 9/13/2021    Procedure: INCISION AND DRAINAGE, FOOT;  Surgeon: Juan Jose Julio DPM;  Location: [facility] OR;  Service: Podiatry;  Laterality: Right;    left breast tumor removed (benign)       LEFT HEART CATHETERIZATION Left 8/9/2019    Procedure: Left heart cath;  Surgeon: Erik Jessica MD;  Location: [facility] CATH;  Service: Cardiology;  Laterality: Left;       Review of Systems   Constitutional: Negative for appetite change, chills, diaphoresis and unexpected weight change.   HENT: Negative for ear discharge, facial swelling, hearing loss, nosebleeds and trouble swallowing.    Eyes: Negative for photophobia, pain and visual disturbance.   Respiratory: Negative for apnea, choking, shortness of breath and wheezing.    Cardiovascular: Negative for chest pain and palpitations.   Gastrointestinal: Negative for abdominal pain, blood in stool and vomiting.   Endocrine: Negative for  "polyphagia.   Genitourinary: Negative for difficulty urinating and hematuria.   Musculoskeletal: Negative for gait problem, joint swelling and neck pain.   Skin: Negative for pallor.   Neurological: Negative for seizures, speech difficulty and headaches.   Hematological: Does not bruise/bleed easily.   Psychiatric/Behavioral: Negative for agitation, confusion, self-injury, sleep disturbance and suicidal ideas. The patient is not nervous/anxious.       OBJECTIVE:      Vitals:    05/27/22 0819 05/27/22 0843   BP: (!) 140/90 136/84   Pulse: 77    Temp: 97.7 °F (36.5 °C)    SpO2: 95%    Weight: (!) 158.3 kg (349 lb)    Height: 5' 11" (1.803 m)      Physical Exam  Vitals and nursing note reviewed.   Constitutional:       General: He is not in acute distress.     Appearance: He is well-developed.   HENT:      Head: Normocephalic and atraumatic.      Nose: Nose normal.      Mouth/Throat:      Pharynx: Uvula midline.   Eyes:      General: Lids are normal.      Conjunctiva/sclera: Conjunctivae normal.      Pupils: Pupils are equal, round, and reactive to light.      Right eye: Pupil is round and reactive.      Left eye: Pupil is round and reactive.   Neck:      Thyroid: No thyromegaly.      Vascular: No carotid bruit.   Cardiovascular:      Rate and Rhythm: Normal rate and regular rhythm.      Pulses: Normal pulses.      Heart sounds: Normal heart sounds.   Pulmonary:      Effort: Pulmonary effort is normal.      Breath sounds: Normal breath sounds.   Abdominal:      General: Bowel sounds are normal.      Palpations: Abdomen is soft. Abdomen is not rigid.      Tenderness: There is no abdominal tenderness.   Musculoskeletal:         General: Normal range of motion.      Cervical back: Normal range of motion and neck supple.      Right lower leg: Edema (trace) present.      Left lower leg: Edema (trace) present.   Lymphadenopathy:      Cervical: No cervical adenopathy.   Skin:     General: Skin is warm and dry.      Nails: " There is no clubbing.   Neurological:      Mental Status: He is alert and oriented to person, place, and time.   Psychiatric:         Mood and Affect: Mood normal.         Speech: Speech normal.         Behavior: Behavior normal. Behavior is cooperative.         Thought Content: Thought content normal.         Judgment: Judgment normal.        Assessment:       1. Type 2 diabetes mellitus treated without insulin    2. Essential hypertension    3. Hyperlipidemia, mixed    4. Screen for colon cancer        Plan:       Type 2 diabetes mellitus treated without insulin  -     Comprehensive Metabolic Panel; Future; Expected date: 06/10/2022  -     Hemoglobin A1C; Future; Expected date: 06/10/2022  -     Microalbumin/Creatinine Ratio, Urine; Future; Expected date: 06/03/2022  -     Ambulatory referral/consult to Ophthalmology; Future; Expected date: 06/03/2022  Not currently taking metformin due to GI side effects. Will check A1c and determine further med management    Essential hypertension  -     Comprehensive Metabolic Panel; Future; Expected date: 06/10/2022  -     Lipid Panel; Future; Expected date: 06/10/2022  Cont current meds, weight loss encouraged    Hyperlipidemia, mixed  -     Comprehensive Metabolic Panel; Future; Expected date: 06/10/2022  -     Lipid Panel; Future; Expected date: 06/10/2022    Screen for colon cancer  -     Ambulatory referral/consult to Gastroenterology; Future; Expected date: 06/03/2022        Follow up in about 6 months (around 11/27/2022) for DM.      5/27/2022 SHELBI Maciel, ORESTESP

## 2022-05-27 ENCOUNTER — OFFICE VISIT (OUTPATIENT)
Dept: FAMILY MEDICINE | Facility: CLINIC | Age: 57
End: 2022-05-27
Payer: COMMERCIAL

## 2022-05-27 ENCOUNTER — TELEPHONE (OUTPATIENT)
Dept: FAMILY MEDICINE | Facility: CLINIC | Age: 57
End: 2022-05-27
Payer: COMMERCIAL

## 2022-05-27 VITALS
BODY MASS INDEX: 44.1 KG/M2 | OXYGEN SATURATION: 95 % | HEART RATE: 77 BPM | TEMPERATURE: 98 F | DIASTOLIC BLOOD PRESSURE: 84 MMHG | WEIGHT: 315 LBS | HEIGHT: 71 IN | SYSTOLIC BLOOD PRESSURE: 136 MMHG

## 2022-05-27 DIAGNOSIS — I10 ESSENTIAL HYPERTENSION: ICD-10-CM

## 2022-05-27 DIAGNOSIS — Z12.11 SCREEN FOR COLON CANCER: ICD-10-CM

## 2022-05-27 DIAGNOSIS — E11.9 TYPE 2 DIABETES MELLITUS TREATED WITHOUT INSULIN: Primary | ICD-10-CM

## 2022-05-27 DIAGNOSIS — E78.2 HYPERLIPIDEMIA, MIXED: ICD-10-CM

## 2022-05-27 PROCEDURE — 3079F PR MOST RECENT DIASTOLIC BLOOD PRESSURE 80-89 MM HG: ICD-10-PCS | Mod: CPTII,S$GLB,, | Performed by: NURSE PRACTITIONER

## 2022-05-27 PROCEDURE — 99214 PR OFFICE/OUTPT VISIT, EST, LEVL IV, 30-39 MIN: ICD-10-PCS | Mod: S$GLB,,, | Performed by: NURSE PRACTITIONER

## 2022-05-27 PROCEDURE — 1160F RVW MEDS BY RX/DR IN RCRD: CPT | Mod: CPTII,S$GLB,, | Performed by: NURSE PRACTITIONER

## 2022-05-27 PROCEDURE — 99214 OFFICE O/P EST MOD 30 MIN: CPT | Mod: S$GLB,,, | Performed by: NURSE PRACTITIONER

## 2022-05-27 PROCEDURE — 3075F PR MOST RECENT SYSTOLIC BLOOD PRESS GE 130-139MM HG: ICD-10-PCS | Mod: CPTII,S$GLB,, | Performed by: NURSE PRACTITIONER

## 2022-05-27 PROCEDURE — 3008F PR BODY MASS INDEX (BMI) DOCUMENTED: ICD-10-PCS | Mod: CPTII,S$GLB,, | Performed by: NURSE PRACTITIONER

## 2022-05-27 PROCEDURE — 4010F ACE/ARB THERAPY RXD/TAKEN: CPT | Mod: CPTII,S$GLB,, | Performed by: NURSE PRACTITIONER

## 2022-05-27 PROCEDURE — 3008F BODY MASS INDEX DOCD: CPT | Mod: CPTII,S$GLB,, | Performed by: NURSE PRACTITIONER

## 2022-05-27 PROCEDURE — 3079F DIAST BP 80-89 MM HG: CPT | Mod: CPTII,S$GLB,, | Performed by: NURSE PRACTITIONER

## 2022-05-27 PROCEDURE — 1159F PR MEDICATION LIST DOCUMENTED IN MEDICAL RECORD: ICD-10-PCS | Mod: CPTII,S$GLB,, | Performed by: NURSE PRACTITIONER

## 2022-05-27 PROCEDURE — 1159F MED LIST DOCD IN RCRD: CPT | Mod: CPTII,S$GLB,, | Performed by: NURSE PRACTITIONER

## 2022-05-27 PROCEDURE — 1160F PR REVIEW ALL MEDS BY PRESCRIBER/CLIN PHARMACIST DOCUMENTED: ICD-10-PCS | Mod: CPTII,S$GLB,, | Performed by: NURSE PRACTITIONER

## 2022-05-27 PROCEDURE — 4010F PR ACE/ARB THEARPY RXD/TAKEN: ICD-10-PCS | Mod: CPTII,S$GLB,, | Performed by: NURSE PRACTITIONER

## 2022-05-27 PROCEDURE — 3075F SYST BP GE 130 - 139MM HG: CPT | Mod: CPTII,S$GLB,, | Performed by: NURSE PRACTITIONER

## 2022-05-27 RX ORDER — ATENOLOL AND CHLORTHALIDONE TABLET 100; 25 MG/1; MG/1
1 TABLET ORAL DAILY
COMMUNITY
Start: 2022-05-03

## 2022-05-30 ENCOUNTER — PATIENT MESSAGE (OUTPATIENT)
Dept: GASTROENTEROLOGY | Facility: CLINIC | Age: 57
End: 2022-05-30
Payer: COMMERCIAL

## 2022-07-07 ENCOUNTER — LAB VISIT (OUTPATIENT)
Dept: LAB | Facility: HOSPITAL | Age: 57
End: 2022-07-07
Attending: NURSE PRACTITIONER
Payer: COMMERCIAL

## 2022-07-07 DIAGNOSIS — E11.9 TYPE 2 DIABETES MELLITUS TREATED WITHOUT INSULIN: ICD-10-CM

## 2022-07-07 DIAGNOSIS — E78.2 HYPERLIPIDEMIA, MIXED: ICD-10-CM

## 2022-07-07 DIAGNOSIS — I10 ESSENTIAL HYPERTENSION: ICD-10-CM

## 2022-07-07 LAB
ALBUMIN SERPL BCP-MCNC: 4.2 G/DL (ref 3.5–5.2)
ALBUMIN/CREAT UR: 19.2 UG/MG (ref 0–30)
ALP SERPL-CCNC: 78 U/L (ref 55–135)
ALT SERPL W/O P-5'-P-CCNC: 35 U/L (ref 10–44)
ANION GAP SERPL CALC-SCNC: 13 MMOL/L (ref 8–16)
AST SERPL-CCNC: 36 U/L (ref 10–40)
BILIRUB SERPL-MCNC: 0.8 MG/DL (ref 0.1–1)
BUN SERPL-MCNC: 22 MG/DL (ref 6–20)
CALCIUM SERPL-MCNC: 9.3 MG/DL (ref 8.7–10.5)
CHLORIDE SERPL-SCNC: 98 MMOL/L (ref 95–110)
CHOLEST SERPL-MCNC: 222 MG/DL (ref 120–199)
CHOLEST/HDLC SERPL: 5.4 {RATIO} (ref 2–5)
CO2 SERPL-SCNC: 26 MMOL/L (ref 23–29)
CREAT SERPL-MCNC: 1.1 MG/DL (ref 0.5–1.4)
CREAT UR-MCNC: 386 MG/DL (ref 23–375)
EST. GFR  (AFRICAN AMERICAN): >60 ML/MIN/1.73 M^2
EST. GFR  (NON AFRICAN AMERICAN): >60 ML/MIN/1.73 M^2
ESTIMATED AVG GLUCOSE: 146 MG/DL (ref 68–131)
GLUCOSE SERPL-MCNC: 141 MG/DL (ref 70–110)
HBA1C MFR BLD: 6.7 % (ref 4.5–6.2)
HDLC SERPL-MCNC: 41 MG/DL (ref 40–75)
HDLC SERPL: 18.5 % (ref 20–50)
LDLC SERPL CALC-MCNC: 111.4 MG/DL (ref 63–159)
MICROALBUMIN UR DL<=1MG/L-MCNC: 74.3 UG/ML
NONHDLC SERPL-MCNC: 181 MG/DL
POTASSIUM SERPL-SCNC: 3.4 MMOL/L (ref 3.5–5.1)
PROT SERPL-MCNC: 7.7 G/DL (ref 6–8.4)
SODIUM SERPL-SCNC: 137 MMOL/L (ref 136–145)
TRIGL SERPL-MCNC: 348 MG/DL (ref 30–150)

## 2022-07-07 PROCEDURE — 83036 HEMOGLOBIN GLYCOSYLATED A1C: CPT | Performed by: NURSE PRACTITIONER

## 2022-07-07 PROCEDURE — 80061 LIPID PANEL: CPT | Performed by: NURSE PRACTITIONER

## 2022-07-07 PROCEDURE — 82570 ASSAY OF URINE CREATININE: CPT | Performed by: NURSE PRACTITIONER

## 2022-07-07 PROCEDURE — 36415 COLL VENOUS BLD VENIPUNCTURE: CPT | Performed by: NURSE PRACTITIONER

## 2022-07-07 PROCEDURE — 80053 COMPREHEN METABOLIC PANEL: CPT | Performed by: NURSE PRACTITIONER

## 2022-07-07 PROCEDURE — 82043 UR ALBUMIN QUANTITATIVE: CPT | Performed by: NURSE PRACTITIONER

## 2022-07-11 ENCOUNTER — PATIENT MESSAGE (OUTPATIENT)
Dept: FAMILY MEDICINE | Facility: CLINIC | Age: 57
End: 2022-07-11

## 2022-10-14 ENCOUNTER — HOSPITAL ENCOUNTER (INPATIENT)
Facility: HOSPITAL | Age: 57
LOS: 6 days | Discharge: HOME HEALTH | DRG: 683 | End: 2022-10-20
Attending: EMERGENCY MEDICINE | Admitting: INTERNAL MEDICINE
Payer: COMMERCIAL

## 2022-10-14 DIAGNOSIS — N17.9 ACUTE RENAL FAILURE SUPERIMPOSED ON STAGE 5 CHRONIC KIDNEY DISEASE, NOT ON CHRONIC DIALYSIS: ICD-10-CM

## 2022-10-14 DIAGNOSIS — R60.9 SWELLING: ICD-10-CM

## 2022-10-14 DIAGNOSIS — N18.5 ACUTE RENAL FAILURE SUPERIMPOSED ON STAGE 5 CHRONIC KIDNEY DISEASE, NOT ON CHRONIC DIALYSIS: ICD-10-CM

## 2022-10-14 DIAGNOSIS — L03.116 CELLULITIS OF LEFT LOWER EXTREMITY: Primary | ICD-10-CM

## 2022-10-14 DIAGNOSIS — A41.9 SEPSIS, DUE TO UNSPECIFIED ORGANISM, UNSPECIFIED WHETHER ACUTE ORGAN DYSFUNCTION PRESENT: ICD-10-CM

## 2022-10-14 DIAGNOSIS — N17.9 AKI (ACUTE KIDNEY INJURY): ICD-10-CM

## 2022-10-14 LAB
ALBUMIN SERPL BCP-MCNC: 3.1 G/DL (ref 3.5–5.2)
ALP SERPL-CCNC: 124 U/L (ref 55–135)
ALT SERPL W/O P-5'-P-CCNC: 43 U/L (ref 10–44)
ANION GAP SERPL CALC-SCNC: 13 MMOL/L (ref 8–16)
AST SERPL-CCNC: 86 U/L (ref 10–40)
BACTERIA #/AREA URNS HPF: NEGATIVE /HPF
BASOPHILS # BLD AUTO: 0.06 K/UL (ref 0–0.2)
BASOPHILS NFR BLD: 0.3 % (ref 0–1.9)
BILIRUB SERPL-MCNC: 1.4 MG/DL (ref 0.1–1)
BILIRUB UR QL STRIP: ABNORMAL
BUN SERPL-MCNC: 55 MG/DL (ref 6–20)
CALCIUM SERPL-MCNC: 8.4 MG/DL (ref 8.7–10.5)
CHLORIDE SERPL-SCNC: 91 MMOL/L (ref 95–110)
CLARITY UR: ABNORMAL
CO2 SERPL-SCNC: 25 MMOL/L (ref 23–29)
COLOR UR: YELLOW
CREAT SERPL-MCNC: 4.7 MG/DL (ref 0.5–1.4)
DIFFERENTIAL METHOD: ABNORMAL
EOSINOPHIL # BLD AUTO: 0 K/UL (ref 0–0.5)
EOSINOPHIL NFR BLD: 0.2 % (ref 0–8)
ERYTHROCYTE [DISTWIDTH] IN BLOOD BY AUTOMATED COUNT: 14.4 % (ref 11.5–14.5)
EST. GFR  (NO RACE VARIABLE): 13.8 ML/MIN/1.73 M^2
GLUCOSE SERPL-MCNC: 156 MG/DL (ref 70–110)
GLUCOSE UR QL STRIP: NEGATIVE
HCT VFR BLD AUTO: 35.2 % (ref 40–54)
HGB BLD-MCNC: 11.8 G/DL (ref 14–18)
HGB UR QL STRIP: ABNORMAL
HYALINE CASTS #/AREA URNS LPF: 47 /LPF
IMM GRANULOCYTES # BLD AUTO: 0.43 K/UL (ref 0–0.04)
IMM GRANULOCYTES NFR BLD AUTO: 2.3 % (ref 0–0.5)
KETONES UR QL STRIP: ABNORMAL
LACTATE SERPL-SCNC: 1.4 MMOL/L (ref 0.5–1.9)
LEUKOCYTE ESTERASE UR QL STRIP: ABNORMAL
LYMPHOCYTES # BLD AUTO: 2 K/UL (ref 1–4.8)
LYMPHOCYTES NFR BLD: 10.8 % (ref 18–48)
MCH RBC QN AUTO: 29.6 PG (ref 27–31)
MCHC RBC AUTO-ENTMCNC: 33.5 G/DL (ref 32–36)
MCV RBC AUTO: 88 FL (ref 82–98)
MICROSCOPIC COMMENT: ABNORMAL
MONOCYTES # BLD AUTO: 1.6 K/UL (ref 0.3–1)
MONOCYTES NFR BLD: 8.5 % (ref 4–15)
NEUTROPHILS # BLD AUTO: 14.6 K/UL (ref 1.8–7.7)
NEUTROPHILS NFR BLD: 77.9 % (ref 38–73)
NITRITE UR QL STRIP: NEGATIVE
NRBC BLD-RTO: 0 /100 WBC
PH UR STRIP: 5 [PH] (ref 5–8)
PLATELET # BLD AUTO: 190 K/UL (ref 150–450)
PMV BLD AUTO: 11.1 FL (ref 9.2–12.9)
POTASSIUM SERPL-SCNC: 2.6 MMOL/L (ref 3.5–5.1)
PROT SERPL-MCNC: 7.7 G/DL (ref 6–8.4)
PROT UR QL STRIP: ABNORMAL
RBC # BLD AUTO: 3.98 M/UL (ref 4.6–6.2)
RBC #/AREA URNS HPF: 7 /HPF (ref 0–4)
SARS-COV-2 RDRP RESP QL NAA+PROBE: NEGATIVE
SODIUM SERPL-SCNC: 129 MMOL/L (ref 136–145)
SP GR UR STRIP: 1.02 (ref 1–1.03)
SQUAMOUS #/AREA URNS HPF: 3 /HPF
URN SPEC COLLECT METH UR: ABNORMAL
UROBILINOGEN UR STRIP-ACNC: ABNORMAL EU/DL
WBC # BLD AUTO: 18.73 K/UL (ref 3.9–12.7)
WBC #/AREA URNS HPF: 32 /HPF (ref 0–5)

## 2022-10-14 PROCEDURE — 63600175 PHARM REV CODE 636 W HCPCS: Performed by: EMERGENCY MEDICINE

## 2022-10-14 PROCEDURE — 87040 BLOOD CULTURE FOR BACTERIA: CPT | Mod: 59 | Performed by: EMERGENCY MEDICINE

## 2022-10-14 PROCEDURE — 96365 THER/PROPH/DIAG IV INF INIT: CPT

## 2022-10-14 PROCEDURE — 81001 URINALYSIS AUTO W/SCOPE: CPT | Performed by: EMERGENCY MEDICINE

## 2022-10-14 PROCEDURE — 87086 URINE CULTURE/COLONY COUNT: CPT | Performed by: EMERGENCY MEDICINE

## 2022-10-14 PROCEDURE — 12000002 HC ACUTE/MED SURGE SEMI-PRIVATE ROOM

## 2022-10-14 PROCEDURE — 25000003 PHARM REV CODE 250: Performed by: EMERGENCY MEDICINE

## 2022-10-14 PROCEDURE — 99285 EMERGENCY DEPT VISIT HI MDM: CPT | Mod: 25

## 2022-10-14 PROCEDURE — 83605 ASSAY OF LACTIC ACID: CPT | Performed by: EMERGENCY MEDICINE

## 2022-10-14 PROCEDURE — U0002 COVID-19 LAB TEST NON-CDC: HCPCS | Performed by: INTERNAL MEDICINE

## 2022-10-14 PROCEDURE — 80053 COMPREHEN METABOLIC PANEL: CPT | Performed by: EMERGENCY MEDICINE

## 2022-10-14 PROCEDURE — 85025 COMPLETE CBC W/AUTO DIFF WBC: CPT | Performed by: EMERGENCY MEDICINE

## 2022-10-14 RX ORDER — ATENOLOL 25 MG/1
50 TABLET ORAL NIGHTLY
Status: DISCONTINUED | OUTPATIENT
Start: 2022-10-14 | End: 2022-10-14

## 2022-10-14 RX ORDER — POTASSIUM CHLORIDE 20 MEQ/1
20 TABLET, EXTENDED RELEASE ORAL ONCE
Status: COMPLETED | OUTPATIENT
Start: 2022-10-14 | End: 2022-10-14

## 2022-10-14 RX ORDER — CLONIDINE HYDROCHLORIDE 0.1 MG/1
0.1 TABLET ORAL 2 TIMES DAILY
Status: DISCONTINUED | OUTPATIENT
Start: 2022-10-14 | End: 2022-10-14

## 2022-10-14 RX ORDER — HYDRALAZINE HYDROCHLORIDE 20 MG/ML
10 INJECTION INTRAMUSCULAR; INTRAVENOUS EVERY 8 HOURS PRN
Status: DISCONTINUED | OUTPATIENT
Start: 2022-10-14 | End: 2022-10-20 | Stop reason: HOSPADM

## 2022-10-14 RX ORDER — SODIUM CHLORIDE, SODIUM LACTATE, POTASSIUM CHLORIDE, CALCIUM CHLORIDE 600; 310; 30; 20 MG/100ML; MG/100ML; MG/100ML; MG/100ML
INJECTION, SOLUTION INTRAVENOUS CONTINUOUS
Status: DISCONTINUED | OUTPATIENT
Start: 2022-10-14 | End: 2022-10-15

## 2022-10-14 RX ORDER — LANOLIN ALCOHOL/MO/W.PET/CERES
800 CREAM (GRAM) TOPICAL
Status: DISCONTINUED | OUTPATIENT
Start: 2022-10-14 | End: 2022-10-20 | Stop reason: HOSPADM

## 2022-10-14 RX ORDER — SODIUM,POTASSIUM PHOSPHATES 280-250MG
2 POWDER IN PACKET (EA) ORAL
Status: DISCONTINUED | OUTPATIENT
Start: 2022-10-14 | End: 2022-10-20 | Stop reason: HOSPADM

## 2022-10-14 RX ORDER — ATENOLOL 50 MG/1
50 TABLET ORAL NIGHTLY
COMMUNITY
Start: 2022-07-28

## 2022-10-14 RX ORDER — POTASSIUM CHLORIDE 7.45 MG/ML
10 INJECTION INTRAVENOUS ONCE
Status: DISCONTINUED | OUTPATIENT
Start: 2022-10-14 | End: 2022-10-14

## 2022-10-14 RX ORDER — SODIUM CHLORIDE 9 MG/ML
500 INJECTION, SOLUTION INTRAVENOUS
Status: COMPLETED | OUTPATIENT
Start: 2022-10-14 | End: 2022-10-14

## 2022-10-14 RX ORDER — AMLODIPINE BESYLATE 5 MG/1
10 TABLET ORAL DAILY
Status: DISCONTINUED | OUTPATIENT
Start: 2022-10-15 | End: 2022-10-14

## 2022-10-14 RX ORDER — CLINDAMYCIN PHOSPHATE 900 MG/50ML
900 INJECTION, SOLUTION INTRAVENOUS
Status: COMPLETED | OUTPATIENT
Start: 2022-10-14 | End: 2022-10-14

## 2022-10-14 RX ADMIN — SODIUM CHLORIDE 500 ML: 0.9 INJECTION, SOLUTION INTRAVENOUS at 04:10

## 2022-10-14 RX ADMIN — CLINDAMYCIN PHOSPHATE 900 MG: 900 INJECTION, SOLUTION INTRAVENOUS at 04:10

## 2022-10-14 RX ADMIN — POTASSIUM CHLORIDE 20 MEQ: 1500 TABLET, EXTENDED RELEASE ORAL at 06:10

## 2022-10-14 RX ADMIN — SODIUM CHLORIDE, SODIUM LACTATE, POTASSIUM CHLORIDE, AND CALCIUM CHLORIDE: .6; .31; .03; .02 INJECTION, SOLUTION INTRAVENOUS at 06:10

## 2022-10-14 NOTE — ED PROVIDER NOTES
Encounter Date: 10/14/2022       History     Chief Complaint   Patient presents with    Leg Swelling     LLE swelling and weeping, redness x2 days ago.      57-year-old male presents complaining of erythema and swelling to his left lower extremity patient reports that this started 2 days ago patient denies trauma patient denies wading in water or any kind of water exposure, patient denies any other acute complaints at this time he has a past medical history of hypertension and hyperlipidemia.  Patient reports redness to this leg with streaking up towards his thigh.  Patient denies decreased range of motion.    Review of patient's allergies indicates:   Allergen Reactions    Tetanus vaccines and toxoid Anaphylaxis, Hives and Rash     Past Medical History:   Diagnosis Date    Hyperlipidemia     Hypertension     Morbid obesity      Past Surgical History:   Procedure Laterality Date    HAND SURGERY Left     22 y/o, laceration repair    HERNIA REPAIR      umbilical hernia    INCISION AND DRAINAGE FOOT Right 9/13/2021    Procedure: INCISION AND DRAINAGE, FOOT;  Surgeon: Juan Jose Julio DPM;  Location: [facility] OR;  Service: Podiatry;  Laterality: Right;    left breast tumor removed (benign)       LEFT HEART CATHETERIZATION Left 8/9/2019    Procedure: Left heart cath;  Surgeon: Erik Jessica MD;  Location: [facility] CATH;  Service: Cardiology;  Laterality: Left;     Family History   Problem Relation Age of Onset    Heart disease Mother         CAD    Hypertension Mother     Cancer Sister         breast    Heart disease Sister 50    Heart disease Brother         MI    Stroke Brother     Heart disease Brother         MI    Heart disease Brother         MI     Social History     Tobacco Use    Smoking status: Former    Smokeless tobacco: Former     Quit date: 7/22/2000    Tobacco comments:     quit 12 years ago   Substance Use Topics    Alcohol use: Yes     Comment: every day 1/2 a fifth of whiskey per day    Drug use: No      Review of Systems   Constitutional:  Negative for fever.   HENT:  Negative for congestion, rhinorrhea, sore throat and trouble swallowing.    Eyes:  Negative for visual disturbance.   Respiratory:  Negative for cough, chest tightness, shortness of breath and wheezing.    Cardiovascular:  Positive for leg swelling. Negative for chest pain and palpitations.   Gastrointestinal:  Negative for abdominal distention, abdominal pain, constipation, diarrhea, nausea and vomiting.   Genitourinary:  Negative for difficulty urinating, dysuria, flank pain and frequency.   Musculoskeletal:  Negative for arthralgias, back pain, joint swelling and neck pain.   Skin:  Positive for color change. Negative for rash.   Neurological:  Negative for dizziness, syncope, speech difficulty, weakness, numbness and headaches.   All other systems reviewed and are negative.    Physical Exam     Initial Vitals [10/14/22 1603]   BP Pulse Resp Temp SpO2   128/71 92 18 98.9 °F (37.2 °C) 96 %      MAP       --         Physical Exam    Nursing note and vitals reviewed.  Constitutional: He appears well-developed and well-nourished. He is not diaphoretic. No distress.   HENT:   Head: Normocephalic and atraumatic.   Right Ear: External ear normal.   Left Ear: External ear normal.   Nose: Nose normal.   Mouth/Throat: Oropharynx is clear and moist. No oropharyngeal exudate.   Eyes: Conjunctivae and EOM are normal. Pupils are equal, round, and reactive to light. Right eye exhibits no discharge. Left eye exhibits no discharge. No scleral icterus.   Neck: Neck supple. No thyromegaly present. No tracheal deviation present. No JVD present.   Normal range of motion.  Cardiovascular:  Normal rate, regular rhythm, normal heart sounds and intact distal pulses.     Exam reveals no gallop and no friction rub.       No murmur heard.  Pulmonary/Chest: Breath sounds normal. No stridor. No respiratory distress. He has no wheezes. He has no rhonchi. He has no rales. He  exhibits no tenderness.   Abdominal: Abdomen is soft. Bowel sounds are normal. He exhibits no distension and no mass. There is no abdominal tenderness. There is no rebound and no guarding.   Musculoskeletal:         General: Edema present. No tenderness. Normal range of motion.      Cervical back: Normal range of motion and neck supple.      Comments: Patient has edema to the left lower extremity patient has erythema which does streak up towards the thigh patient has no obvious fluctuance to the leg patient does have serous weeping noted to the left lower extremity with erythema to approximately 60 percent of the left lower extremity there is no decreased range of motion no sign of joint involvement.     Lymphadenopathy:     He has no cervical adenopathy.   Neurological: He is alert and oriented to person, place, and time. He has normal strength. No cranial nerve deficit or sensory deficit.   Skin: Skin is warm and dry. No rash noted. No erythema.       ED Course   Procedures  Labs Reviewed   CBC W/ AUTO DIFFERENTIAL - Abnormal; Notable for the following components:       Result Value    WBC 18.73 (*)     RBC 3.98 (*)     Hemoglobin 11.8 (*)     Hematocrit 35.2 (*)     Immature Granulocytes 2.3 (*)     Gran # (ANC) 14.6 (*)     Immature Grans (Abs) 0.43 (*)     Mono # 1.6 (*)     Gran % 77.9 (*)     Lymph % 10.8 (*)     All other components within normal limits   COMPREHENSIVE METABOLIC PANEL - Abnormal; Notable for the following components:    Sodium 129 (*)     Potassium 2.6 (*)     Chloride 91 (*)     Glucose 156 (*)     BUN 55 (*)     Creatinine 4.7 (*)     Calcium 8.4 (*)     Albumin 3.1 (*)     Total Bilirubin 1.4 (*)     AST 86 (*)     eGFR 13.8 (*)     All other components within normal limits    Narrative:     Potassium critical result(s) called and verbal readback obtained from   Luly John RN by HS3 10/14/2022 18:03   URINALYSIS, REFLEX TO URINE CULTURE - Abnormal; Notable for the following components:     Appearance, UA Hazy (*)     Protein, UA 1+ (*)     Ketones, UA Trace (*)     Bilirubin (UA) 1+ (*)     Occult Blood UA 1+ (*)     Urobilinogen, UA 2.0-3.0 (*)     Leukocytes, UA 1+ (*)     All other components within normal limits    Narrative:     Specimen Source->Urine   URINALYSIS MICROSCOPIC - Abnormal; Notable for the following components:    RBC, UA 7 (*)     WBC, UA 32 (*)     Hyaline Casts, UA 47 (*)     All other components within normal limits    Narrative:     Specimen Source->Urine   CULTURE, BLOOD   CULTURE, BLOOD   CULTURE, URINE   LACTIC ACID, PLASMA   SARS-COV-2 RNA AMPLIFICATION, QUAL          Imaging Results              US Lower Extremity Veins Left (Final result)  Result time 10/14/22 18:10:22      Final result by Jules Snow MD (10/14/22 18:10:22)                   Narrative:    US LOWER EXTREMITY VEINS LIMITED FOLLOW-UP UNILATERAL    CLINICAL HISTORY:  57 years Male swelling    FINDINGS: Grayscale, color and spectral Doppler analysis of the left lower extremity deep venous system was performed.    There is normal compressibility, with normal flow by color and spectral Doppler analysis in the left lower extremity deep venous system, with normal augmentation and no evidence of deep venous thrombosis. Subcutaneous edema of the left lower extremity.    IMPRESSION: Negative for DVT.    Electronically signed by:  Jules Snow MD  10/14/2022 6:10 PM CDT Workstation: [id number]                                     X-Ray Tibia Fibula 2 View Left (Final result)  Result time 10/14/22 16:57:21      Final result by Malik Valentin MD (10/14/22 16:57:21)                   Narrative:    Reason: Leg Swelling with redness and weeping x2 days    FINDINGS:  2 views of left tibia and fibula show no fracture, dislocation, or destructive osseous lesion. Diffuse subcutaneous edema is suggested throughout the visualized left lower extremity. Calcaneal enthesophyte arises near plantar fascia  origin.    IMPRESSION:  Diffuse visualized left lower extremity subcutaneous edema suggested without acute bony abnormality.    Electronically signed by:  Malik Valentin MD  10/14/2022 4:57 PM CDT Workstation: [id number]                                     Medications   lactated ringers infusion ( Intravenous New Bag 10/14/22 1819)   potassium bicarbonate disintegrating tablet 50 mEq (has no administration in time range)   potassium bicarbonate disintegrating tablet 35 mEq (has no administration in time range)   potassium bicarbonate disintegrating tablet 60 mEq (has no administration in time range)   magnesium oxide tablet 800 mg (has no administration in time range)   magnesium oxide tablet 800 mg (has no administration in time range)   potassium, sodium phosphates 280-160-250 mg packet 2 packet (has no administration in time range)   potassium, sodium phosphates 280-160-250 mg packet 2 packet (has no administration in time range)   potassium, sodium phosphates 280-160-250 mg packet 2 packet (has no administration in time range)   hydrALAZINE injection 10 mg (has no administration in time range)   clindamycin in D5W 900 mg/50 mL IVPB 900 mg (0 mg Intravenous Stopped 10/14/22 1740)   0.9%  NaCl infusion (500 mLs Intravenous New Bag 10/14/22 1651)   potassium chloride SA CR tablet 20 mEq (20 mEq Oral Given 10/14/22 1820)     Medical Decision Making:   History:   Old Medical Records: I decided to obtain old medical records.  Initial Assessment:   Emergent evaluation of a 57-year-old male presenting with left lower extremity erythema and swelling differential diagnosis includes cellulitis, DVT, soft tissue injury          Attending Attestation:             Attending ED Notes:   Patient has acute kidney injury and cellulitis, patient convinced to be admitted to the hospital patient consulted Internal Medicine for further evaluation and management.                 Clinical Impression:   Final diagnoses:  [R60.9]  Swelling  [L03.116] Cellulitis of left lower extremity (Primary)  [N17.9] BRENDAN (acute kidney injury)  [A41.9] Sepsis, due to unspecified organism, unspecified whether acute organ dysfunction present  [N17.9, N18.5] Acute renal failure superimposed on stage 5 chronic kidney disease, not on chronic dialysis        ED Disposition Condition    Admit Stable                Vikash Pacheco MD  10/14/22 2011

## 2022-10-15 PROBLEM — E87.6 HYPOKALEMIA: Status: ACTIVE | Noted: 2022-10-15

## 2022-10-15 LAB
ANION GAP SERPL CALC-SCNC: 12 MMOL/L (ref 8–16)
ANISOCYTOSIS BLD QL SMEAR: SLIGHT
BASOPHILS NFR BLD: 0 % (ref 0–1.9)
BUN SERPL-MCNC: 70 MG/DL (ref 6–20)
CALCIUM SERPL-MCNC: 8.3 MG/DL (ref 8.7–10.5)
CHLORIDE SERPL-SCNC: 93 MMOL/L (ref 95–110)
CO2 SERPL-SCNC: 25 MMOL/L (ref 23–29)
CREAT SERPL-MCNC: 5.4 MG/DL (ref 0.5–1.4)
DIFFERENTIAL METHOD: ABNORMAL
EOSINOPHIL NFR BLD: 0 % (ref 0–8)
ERYTHROCYTE [DISTWIDTH] IN BLOOD BY AUTOMATED COUNT: 14.6 % (ref 11.5–14.5)
EST. GFR  (NO RACE VARIABLE): 11.7 ML/MIN/1.73 M^2
ESTIMATED AVG GLUCOSE: 134 MG/DL (ref 68–131)
GLUCOSE SERPL-MCNC: 166 MG/DL (ref 70–110)
GLUCOSE SERPL-MCNC: 177 MG/DL (ref 70–110)
GLUCOSE SERPL-MCNC: 187 MG/DL (ref 70–110)
HBA1C MFR BLD: 6.3 % (ref 4.5–6.2)
HCT VFR BLD AUTO: 31.3 % (ref 40–54)
HGB BLD-MCNC: 10.4 G/DL (ref 14–18)
IMM GRANULOCYTES # BLD AUTO: ABNORMAL K/UL (ref 0–0.04)
IMM GRANULOCYTES NFR BLD AUTO: ABNORMAL % (ref 0–0.5)
LYMPHOCYTES NFR BLD: 8 % (ref 18–48)
MCH RBC QN AUTO: 30 PG (ref 27–31)
MCHC RBC AUTO-ENTMCNC: 33.2 G/DL (ref 32–36)
MCV RBC AUTO: 90 FL (ref 82–98)
MONOCYTES NFR BLD: 3 % (ref 4–15)
NEUTROPHILS NFR BLD: 83 % (ref 38–73)
NEUTS BAND NFR BLD MANUAL: 6 %
NRBC BLD-RTO: 0 /100 WBC
OSMOLALITY UR: 381 MOSM/KG (ref 50–1200)
PLATELET # BLD AUTO: 180 K/UL (ref 150–450)
PMV BLD AUTO: 10.9 FL (ref 9.2–12.9)
POTASSIUM SERPL-SCNC: 2.4 MMOL/L (ref 3.5–5.1)
RBC # BLD AUTO: 3.47 M/UL (ref 4.6–6.2)
SODIUM SERPL-SCNC: 130 MMOL/L (ref 136–145)
SODIUM UR-SCNC: <10 MMOL/L (ref 20–250)
TSH SERPL DL<=0.005 MIU/L-ACNC: 2.71 UIU/ML (ref 0.34–5.6)
WBC # BLD AUTO: 19.19 K/UL (ref 3.9–12.7)

## 2022-10-15 PROCEDURE — 36415 COLL VENOUS BLD VENIPUNCTURE: CPT | Performed by: INTERNAL MEDICINE

## 2022-10-15 PROCEDURE — 84300 ASSAY OF URINE SODIUM: CPT | Performed by: INTERNAL MEDICINE

## 2022-10-15 PROCEDURE — 84443 ASSAY THYROID STIM HORMONE: CPT | Performed by: INTERNAL MEDICINE

## 2022-10-15 PROCEDURE — 84166 PROTEIN E-PHORESIS/URINE/CSF: CPT | Performed by: INTERNAL MEDICINE

## 2022-10-15 PROCEDURE — 83036 HEMOGLOBIN GLYCOSYLATED A1C: CPT | Performed by: INTERNAL MEDICINE

## 2022-10-15 PROCEDURE — 63600175 PHARM REV CODE 636 W HCPCS: Performed by: INTERNAL MEDICINE

## 2022-10-15 PROCEDURE — 85027 COMPLETE CBC AUTOMATED: CPT | Performed by: INTERNAL MEDICINE

## 2022-10-15 PROCEDURE — 12000002 HC ACUTE/MED SURGE SEMI-PRIVATE ROOM

## 2022-10-15 PROCEDURE — 85007 BL SMEAR W/DIFF WBC COUNT: CPT | Performed by: INTERNAL MEDICINE

## 2022-10-15 PROCEDURE — 83935 ASSAY OF URINE OSMOLALITY: CPT | Performed by: INTERNAL MEDICINE

## 2022-10-15 PROCEDURE — 25000003 PHARM REV CODE 250: Performed by: INTERNAL MEDICINE

## 2022-10-15 PROCEDURE — 80048 BASIC METABOLIC PNL TOTAL CA: CPT | Performed by: INTERNAL MEDICINE

## 2022-10-15 PROCEDURE — 84156 ASSAY OF PROTEIN URINE: CPT | Performed by: INTERNAL MEDICINE

## 2022-10-15 RX ORDER — POTASSIUM CHLORIDE 20 MEQ/1
40 TABLET, EXTENDED RELEASE ORAL 2 TIMES DAILY
Status: DISCONTINUED | OUTPATIENT
Start: 2022-10-15 | End: 2022-10-17

## 2022-10-15 RX ORDER — ALLOPURINOL 300 MG/1
300 TABLET ORAL DAILY
Status: DISCONTINUED | OUTPATIENT
Start: 2022-10-15 | End: 2022-10-15

## 2022-10-15 RX ORDER — NITROGLYCERIN 0.4 MG/1
0.4 TABLET SUBLINGUAL EVERY 5 MIN PRN
Status: DISCONTINUED | OUTPATIENT
Start: 2022-10-15 | End: 2022-10-20 | Stop reason: HOSPADM

## 2022-10-15 RX ORDER — HYDROCODONE BITARTRATE AND ACETAMINOPHEN 5; 325 MG/1; MG/1
1 TABLET ORAL EVERY 4 HOURS PRN
Status: DISCONTINUED | OUTPATIENT
Start: 2022-10-15 | End: 2022-10-20 | Stop reason: HOSPADM

## 2022-10-15 RX ORDER — ONDANSETRON 2 MG/ML
4 INJECTION INTRAMUSCULAR; INTRAVENOUS EVERY 8 HOURS PRN
Status: DISCONTINUED | OUTPATIENT
Start: 2022-10-15 | End: 2022-10-20 | Stop reason: HOSPADM

## 2022-10-15 RX ORDER — ENOXAPARIN SODIUM 100 MG/ML
30 INJECTION SUBCUTANEOUS EVERY 24 HOURS
Status: DISCONTINUED | OUTPATIENT
Start: 2022-10-15 | End: 2022-10-15

## 2022-10-15 RX ORDER — ATORVASTATIN CALCIUM 40 MG/1
40 TABLET, FILM COATED ORAL DAILY
Status: DISCONTINUED | OUTPATIENT
Start: 2022-10-15 | End: 2022-10-17

## 2022-10-15 RX ORDER — CLOPIDOGREL BISULFATE 75 MG/1
75 TABLET ORAL DAILY
Status: DISCONTINUED | OUTPATIENT
Start: 2022-10-15 | End: 2022-10-20 | Stop reason: HOSPADM

## 2022-10-15 RX ORDER — ENOXAPARIN SODIUM 100 MG/ML
40 INJECTION SUBCUTANEOUS EVERY 24 HOURS
Status: DISCONTINUED | OUTPATIENT
Start: 2022-10-15 | End: 2022-10-17

## 2022-10-15 RX ORDER — MORPHINE SULFATE 4 MG/ML
4 INJECTION, SOLUTION INTRAMUSCULAR; INTRAVENOUS
Status: DISCONTINUED | OUTPATIENT
Start: 2022-10-15 | End: 2022-10-20 | Stop reason: HOSPADM

## 2022-10-15 RX ORDER — SODIUM CHLORIDE AND POTASSIUM CHLORIDE 150; 900 MG/100ML; MG/100ML
INJECTION, SOLUTION INTRAVENOUS CONTINUOUS
Status: DISPENSED | OUTPATIENT
Start: 2022-10-15 | End: 2022-10-16

## 2022-10-15 RX ORDER — ACETAMINOPHEN 325 MG/1
650 TABLET ORAL EVERY 8 HOURS PRN
Status: DISCONTINUED | OUTPATIENT
Start: 2022-10-15 | End: 2022-10-20 | Stop reason: HOSPADM

## 2022-10-15 RX ORDER — TALC
6 POWDER (GRAM) TOPICAL NIGHTLY PRN
Status: DISCONTINUED | OUTPATIENT
Start: 2022-10-15 | End: 2022-10-20 | Stop reason: HOSPADM

## 2022-10-15 RX ORDER — CEFEPIME HYDROCHLORIDE 1 G/50ML
1 INJECTION, SOLUTION INTRAVENOUS
Status: DISCONTINUED | OUTPATIENT
Start: 2022-10-15 | End: 2022-10-16

## 2022-10-15 RX ORDER — PANTOPRAZOLE SODIUM 40 MG/1
40 TABLET, DELAYED RELEASE ORAL
Status: DISCONTINUED | OUTPATIENT
Start: 2022-10-15 | End: 2022-10-20 | Stop reason: HOSPADM

## 2022-10-15 RX ORDER — POTASSIUM CHLORIDE 7.45 MG/ML
10 INJECTION INTRAVENOUS
Status: DISPENSED | OUTPATIENT
Start: 2022-10-15 | End: 2022-10-15

## 2022-10-15 RX ADMIN — VANCOMYCIN HYDROCHLORIDE 2000 MG: 500 INJECTION, POWDER, LYOPHILIZED, FOR SOLUTION INTRAVENOUS at 06:10

## 2022-10-15 RX ADMIN — PIPERACILLIN SODIUM AND TAZOBACTAM SODIUM 3.38 G: 3; .375 INJECTION, POWDER, LYOPHILIZED, FOR SOLUTION INTRAVENOUS at 01:10

## 2022-10-15 RX ADMIN — CEFEPIME HYDROCHLORIDE 1 G: 1 INJECTION, SOLUTION INTRAVENOUS at 08:10

## 2022-10-15 RX ADMIN — HYDROCODONE BITARTRATE AND ACETAMINOPHEN 1 TABLET: 5; 325 TABLET ORAL at 09:10

## 2022-10-15 RX ADMIN — ENOXAPARIN SODIUM 40 MG: 100 INJECTION SUBCUTANEOUS at 05:10

## 2022-10-15 RX ADMIN — CEFEPIME HYDROCHLORIDE 1 G: 1 INJECTION, SOLUTION INTRAVENOUS at 09:10

## 2022-10-15 RX ADMIN — POTASSIUM CHLORIDE 40 MEQ: 1500 TABLET, EXTENDED RELEASE ORAL at 03:10

## 2022-10-15 RX ADMIN — HYDROCODONE BITARTRATE AND ACETAMINOPHEN 1 TABLET: 5; 325 TABLET ORAL at 01:10

## 2022-10-15 RX ADMIN — CLOPIDOGREL BISULFATE 75 MG: 75 TABLET, FILM COATED ORAL at 09:10

## 2022-10-15 RX ADMIN — PANTOPRAZOLE SODIUM 40 MG: 40 TABLET, DELAYED RELEASE ORAL at 06:10

## 2022-10-15 RX ADMIN — POTASSIUM CHLORIDE 10 MEQ: 7.46 INJECTION, SOLUTION INTRAVENOUS at 11:10

## 2022-10-15 RX ADMIN — ATORVASTATIN CALCIUM 40 MG: 40 TABLET, FILM COATED ORAL at 09:10

## 2022-10-15 RX ADMIN — POTASSIUM CHLORIDE AND SODIUM CHLORIDE: 900; 150 INJECTION, SOLUTION INTRAVENOUS at 03:10

## 2022-10-15 NOTE — ASSESSMENT & PLAN NOTE
Inpatient admission; culture as above; volume; hold nephrotoxins (ARB, Tenoretic, NSAID's), prn analgesia; hold hypertensives currently using hydralazine per prn orders instead; broad spectrum antibiotics currently, with narrowing spectrum  pending culture results; Nephrology consultation; A1c, TSH with AM labs for review

## 2022-10-15 NOTE — CONSULTS
" INPATIENT NEPHROLOGY CONSULT   [facility] NEPHROLOGY    Be BARAJAS Shipman  10/15/2022    Reason for consultation:    Acute kidney injury    Chief Complaint:   Chief Complaint   Patient presents with    Leg Swelling     LLE swelling and weeping, redness x2 days ago.          History of Present Illness:    Per H and P    57 year old male with history of HTN, HLD, Morbid Obesity presented to ED complaining of pain, swelling and redness of LLE X 3 days, progressively worsening in swelling and redness. Is 4-8/10 painful (aching), waxing and waning of own accord, but does worsen with usage of limb, with radiations towards groin. Has been taking "A lot" of ibuprofen for the pain. Was hypotensive (90's) coming through the door, which has responded to volume.    10/15  consulted for BRENDAN.  Pt had leg pain.  He stated that he had food poisoning starting on Monday and had severe nausea and vomiting and diarrhea all week.  He still has diarrhea.  No nausea.  Appetite is intact.  No confusion.  He denies NSAID use.  He was only taking tylenol for pain    Plan of Care:       Assessment:    Acute kidney injury likely due to hemodynamic renal compromise from volume depletion and hypotension  --volume resuscitation  --renal us/bladder us  --rheumatologic serologies given proteinuria and microhematuria  --old benicar and lasix  --hold metformin  --Avoid NSAIDS, Patel II inhibitors, and other non-essential nephrotoxic agents  --strict I/Os  --at risk for contrast nephropathy  --note:  pt had normal cr in July.  He doesn't appear to have "stage 5 chronic kidney disease"     Hypokalemia  --replete   --check magnesium    Anemia  --iron panel  --paraproteins    Cellulitis  --renal dose abx for crcl 10-50    Hyponatremia  --urine osm  --no SSRI antidepressants or thiazide diuretics  --avoid hypotonic iv piggybacks  --uric acid  --TSH is normal  --bnp        I have discussed the risks and benefits of hemodialysis with the patient.  All of their " questions were answered.  Risks include low blood pressure, stroke, heart attack, seizure, infection, nausea, delirium, and death.  He has no acute need today.                   Thank you for allowing us to participate in this patient's care. We will continue to follow.    Vital Signs:  Temp Readings from Last 3 Encounters:   10/15/22 97.5 °F (36.4 °C) (Oral)   05/27/22 97.7 °F (36.5 °C)   11/05/21 97.9 °F (36.6 °C)       Pulse Readings from Last 3 Encounters:   10/15/22 81   05/27/22 77   11/05/21 99       BP Readings from Last 3 Encounters:   10/15/22 115/69   05/27/22 136/84   11/05/21 (!) 136/92       Weight:  Wt Readings from Last 3 Encounters:   10/15/22 (!) 159.2 kg (350 lb 15.6 oz)   05/27/22 (!) 158.3 kg (349 lb)   11/05/21 (!) 156.5 kg (345 lb)       Past Medical & Surgical History:  Past Medical History:   Diagnosis Date    Anticoagulant long-term use     Coronary artery disease     Diabetes mellitus     Hyperlipidemia     Hypertension     Morbid obesity        Past Surgical History:   Procedure Laterality Date    CORONARY STENT PLACEMENT  08/09/2019    pt has card in PEDRO ortiz [facility] Dr. Grimm    HAND SURGERY Left     22 y/o, laceration repair    HERNIA REPAIR      umbilical hernia    INCISION AND DRAINAGE FOOT Right 09/13/2021    Procedure: INCISION AND DRAINAGE, FOOT;  Surgeon: Juan Jose Julio DPM;  Location: [facility] OR;  Service: Podiatry;  Laterality: Right;    left breast tumor removed (benign)       LEFT HEART CATHETERIZATION Left 08/09/2019    Procedure: Left heart cath;  Surgeon: Erik Jessica MD;  Location: [facility] CATH;  Service: Cardiology;  Laterality: Left;       Past Social History:  Social History     Socioeconomic History    Marital status:    Tobacco Use    Smoking status: Former    Smokeless tobacco: Former     Quit date: 7/22/2000    Tobacco comments:     quit 12 years ago   Substance and Sexual Activity    Alcohol use: Yes     Comment: every day 1/2 a fifth of whiskey per  day    Drug use: No    Sexual activity: Yes       Medications:  No current facility-administered medications on file prior to encounter.     Current Outpatient Medications on File Prior to Encounter   Medication Sig Dispense Refill    allopurinoL (ZYLOPRIM) 300 MG tablet TAKE 1 TABLET BY MOUTH EVERY DAY (Patient taking differently: Take 300 mg by mouth once daily.) 90 tablet 0    amLODIPine (NORVASC) 10 MG tablet Take 1 tablet (10 mg total) by mouth once daily. 90 tablet 3    atenoloL (TENORMIN) 50 MG tablet Take 50 mg by mouth every evening.      atenoloL-chlorthalidone (TENORETIC) 100-25 mg per tablet Take 1 tablet by mouth once daily.      atorvastatin (LIPITOR) 40 MG tablet TAKE 1 TABLET BY MOUTH EVERY DAY (Patient taking differently: Take 40 mg by mouth once daily.) 90 tablet 0    cloNIDine (CATAPRES) 0.1 MG tablet Take 1 tablet (0.1 mg total) by mouth 2 (two) times daily. 180 tablet 0    clopidogreL (PLAVIX) 75 mg tablet TAKE 1 TABLET BY MOUTH EVERY DAY (Patient taking differently: Take 75 mg by mouth once daily.) 90 tablet 3    esomeprazole (NEXIUM) 40 MG capsule Take 40 mg by mouth before breakfast.      furosemide (LASIX) 20 MG tablet Take 1 tablet (20 mg total) by mouth once daily. 90 tablet 0    olmesartan (BENICAR) 40 MG tablet Take 1 tablet (40 mg total) by mouth once daily. 90 tablet 3    potassium chloride SA (K-DUR,KLOR-CON) 20 MEQ tablet TAKE 1 TABLET BY MOUTH EVERY DAY (Patient taking differently: Take 20 mEq by mouth once daily.) 90 tablet 0    metFORMIN (GLUCOPHAGE-XR) 500 MG ER 24hr tablet TAKE 1 TABLET BY MOUTH EVERY DAY WITH BREAKFAST (Patient taking differently: Take 500 mg by mouth daily with breakfast.) 90 tablet 0    nitroGLYCERIN (NITROSTAT) 0.4 MG SL tablet Place 1 tablet (0.4 mg total) under the tongue every 5 (five) minutes as needed for Chest pain. 25 tablet 2     Scheduled Meds:   atorvastatin  40 mg Oral Daily    ceFEPime (MAXIPIME) IVPB  1 g Intravenous Q12H    clopidogreL  75 mg  "Oral Daily    enoxaparin  40 mg Subcutaneous Daily    pantoprazole  40 mg Oral Before breakfast     Continuous Infusions:   lactated ringers Stopped (10/15/22 0224)     PRN Meds:.acetaminophen, hydrALAZINE, HYDROcodone-acetaminophen, magnesium oxide, magnesium oxide, melatonin, morphine, nitroGLYCERIN, ondansetron, potassium bicarbonate, potassium bicarbonate, potassium bicarbonate, potassium, sodium phosphates, potassium, sodium phosphates, potassium, sodium phosphates, Pharmacy to dose Vancomycin consult **AND** vancomycin - pharmacy to dose    Allergies:  Tetanus vaccines and toxoid    Past Family History:  Reviewed; refer to Hospitalist Admission Note    Review of Systems:  Review of Systems - All 14 systems reviewed and negative, except as noted in HPI    Physical Exam:    /69 (BP Location: Right arm)   Pulse 81   Temp 97.5 °F (36.4 °C) (Oral)   Resp 17   Ht 5' 11" (1.803 m)   Wt (!) 159.2 kg (350 lb 15.6 oz)   SpO2 95%   BMI 48.95 kg/m²     General Appearance:    Alert, cooperative, no distress, appears stated age   Head:    Normocephalic, without obvious abnormality, atraumatic   Eyes:    PER, conjunctiva/corneas clear, EOM's intact in both eyes        Throat:   Lips, mucosa, and tongue normal; teeth and gums normal   Back:     Symmetric, no curvature, ROM normal, no CVA tenderness   Lungs:     Clear to auscultation bilaterally, respirations unlabored   Chest wall:    No tenderness or deformity   Heart:    Regular rate and rhythm, S1 and S2 normal, no murmur, rub   or gallop   Abdomen:     Soft, non-tender, bowel sounds active all four quadrants,     no masses, no organomegaly   Extremities:   Left leg swollen and red   Pulses:   2+ and symmetric all extremities   MSK:   No joint or muscle swelling, tenderness or deformity   Skin:   Skin color, texture, turgor normal, no rashes or lesions   Neurologic:   CNII-XII intact, normal strength and sensation       Throughout.  No flap     Results:  Lab " Results   Component Value Date     (L) 10/15/2022    K 2.4 (LL) 10/15/2022    CL 93 (L) 10/15/2022    CO2 25 10/15/2022    BUN 70 (H) 10/15/2022    CREATININE 5.4 (H) 10/15/2022    CALCIUM 8.3 (L) 10/15/2022    ANIONGAP 12 10/15/2022    ESTGFRAFRICA >60.0 07/07/2022    EGFRNONAA >60.0 07/07/2022       Lab Results   Component Value Date    CALCIUM 8.3 (L) 10/15/2022    PHOS 2.9 09/11/2021       Recent Labs   Lab 10/15/22  0525   WBC 19.19*   RBC 3.47*   HGB 10.4*   HCT 31.3*      MCV 90   MCH 30.0   MCHC 33.2          I have personally reviewed pertinent radiological imaging and reports.    Patient care was time spent personally by me on the following activities:   Obtaining a history  Examination of patient.  Providing medical care at the patients bedside.  Developing a treatment plan with patient or surrogate and bedside caregivers  Ordering and reviewing laboratory studies, radiographic studies, pulse oximetry.  Ordering and performing treatments and interventions.  Evaluation of patient's response to treatment.  Discussions with consultants while on the unit and immediately available to the patient.  Re-evaluation of the patient's condition.  Documentation in the medical record.     Raj Wilson MD  Nephrology  [facility]  [phone]

## 2022-10-15 NOTE — PROGRESS NOTES
Pharmacist Renal Dose Adjustment Note    Be Shipman is a 57 y.o. male being treated with the medication Lovenox    Patient Data:    Vital Signs (Most Recent):  Temp: 98.9 °F (37.2 °C) (10/14/22 1603)  Pulse: 76 (10/14/22 2301)  Resp: 18 (10/14/22 1603)  BP: (!) 123/58 (10/14/22 2301)  SpO2: 95 % (10/14/22 1749)   Vital Signs (72h Range):  Temp:  [98.9 °F (37.2 °C)]   Pulse:  [76-92]   Resp:  [18]   BP: ()/(39-71)   SpO2:  [95 %-96 %]      Recent Labs   Lab 10/14/22  1615   CREATININE 4.7*     Serum creatinine: 4.7 mg/dL (H) 10/14/22 1615  Estimated creatinine clearance: 26.7 mL/min (A)    Medication:Lovenox dose: 30 mg frequency Q24H will be changed to medication:Lovenox dose:40 mg frequency:Q24H    Pharmacist's Name: Rell Christopher  Pharmacist's Extension: [phone]

## 2022-10-15 NOTE — PLAN OF CARE
[facility]  Initial Discharge Assessment       Primary Care Provider: Werner Patel NP    Admission Diagnosis: Acute renal failure superimposed on stage 5 chronic kidney disease, not on chronic dialysis [N17.9, N18.5]    Admission Date: 10/14/2022  Expected Discharge Date:     Discharge Barriers Identified: None    Patient anticipates discharge home with no needs.    Payor: BLUE CROSS BLUE SHIELD / Plan: BCBS ALL OUT OF STATE / Product Type: PPO /     Extended Emergency Contact Information  Primary Emergency Contact: ShipmanMarcela boyer  Address: [facility] [address] [facility] [place] [address] [place]  Mobile Phone: [phone]  Relation: Spouse  Preferred language: English   needed? No    Discharge Plan A: Home  Discharge Plan B: Home      [id number] - [place] - [address]  [address]  [facility] [address]  Phone: [phone] Fax: [phone]      Initial Assessment (most recent)       Adult Discharge Assessment - 10/15/22 1549          Discharge Assessment    Assessment Type Discharge Planning Assessment     Confirmed/corrected address, phone number and insurance Yes     Confirmed Demographics Correct on Facesheet     Source of Information patient     When was your last doctors appointment? --   2022    Reason For Admission Acute renal failure     Lives With spouse     Facility Arrived From: home     Do you expect to return to your current living situation? Yes     Do you have help at home or someone to help you manage your care at home? Yes     Who are your caregiver(s) and their phone number(s)? Marcela (spouse) [phone]     Prior to hospitilization cognitive status: Alert/Oriented     Current cognitive status: Alert/Oriented     Walking or Climbing Stairs Difficulty none     Dressing/Bathing Difficulty none     Home Accessibility wheelchair accessible     Equipment Currently Used at Home none     Readmission within 30 days? No     Patient currently  being followed by outpatient case management? No     Do you currently have service(s) that help you manage your care at home? No     Do you take prescription medications? Yes     Do you have prescription coverage? Yes     Coverage BCBS     Do you have any problems affording any of your prescribed medications? No     Is the patient taking medications as prescribed? yes     Who is going to help you get home at discharge? Marcela (spouse) [phone]     How do you get to doctors appointments? family or friend will provide     Are you on dialysis? No     Do you take coumadin? No     Discharge Plan A Home     Discharge Plan B Home     DME Needed Upon Discharge  none     Discharge Plan discussed with: Spouse/sig other;Patient     Name(s) and Number(s) Marcela (spouse) [phone]     Discharge Barriers Identified None        Physical Activity    On average, how many days per week do you engage in moderate to strenuous exercise (like a brisk walk)? 5 days     On average, how many minutes do you engage in exercise at this level? 30 min        Financial Resource Strain    How hard is it for you to pay for the very basics like food, housing, medical care, and heating? Not very hard        Housing Stability    In the last 12 months, was there a time when you were not able to pay the mortgage or rent on time? No     In the last 12 months, how many places have you lived? 1     In the last 12 months, was there a time when you did not have a steady place to sleep or slept in a shelter (including now)? No        Transportation Needs    In the past 12 months, has lack of transportation kept you from medical appointments or from getting medications? No     In the past 12 months, has lack of transportation kept you from meetings, work, or from getting things needed for daily living? No        Food Insecurity    Within the past 12 months, you worried that your food would run out before you got the money to buy more. Never true      Within the past 12 months, the food you bought just didn't last and you didn't have money to get more. Never true        Stress    Do you feel stress - tense, restless, nervous, or anxious, or unable to sleep at night because your mind is troubled all the time - these days? Not at all        Social Connections    In a typical week, how many times do you talk on the phone with family, friends, or neighbors? More than three times a week     How often do you get together with friends or relatives? More than three times a week     How often do you attend Holiness or Latter day services? Never     Do you belong to any clubs or organizations such as Holiness groups, unions, fraternal or athletic groups, or school groups? No     How often do you attend meetings of the clubs or organizations you belong to? Never     Are you , , , , never , or living with a partner?         Alcohol Use    Q1: How often do you have a drink containing alcohol? Never     Q2: How many drinks containing alcohol do you have on a typical day when you are drinking? Patient does not drink     Q3: How often do you have six or more drinks on one occasion? Never        Relationship/Environment    Name(s) of Who Lives With Patient Marcela (spouse) [phone]

## 2022-10-15 NOTE — HPI
"57 year old male with history of HTN, HLD, Morbid Obesity presented to ED complaining of pain, swelling and redness of LLE X 3 days, progressively worsening in swelling and redness. Is 4-8/10 painful (aching), waxing and waning of own accord, but does worsen with usage of limb, with radiations towards groin. Has been taking "A lot" of ibuprofen for the pain. Was hypotensive (90's) coming through the door, which has responded to volume.    In ED: labs reviewed and noted below: leukocytosis with minimal normocytic anemia; hyponatremia, hypokalemia (treated in ED) with stage 4/5 renal dysfunction (no prior history); lactate is normal. UA: mild protein and hematuria; nitrite neg. Cultured in ED. Tib/Fib films reviewed: no bony path. US leg: no DVT. Telemetry reviewed: sinus    Discussed with ED MD: Inpatient admission; culture as above; volume; hold nephrotoxins (ARB, Tenoretic, NSAID's), prn analgesia; hold hypertensives currently using hydralazine per prn orders instead; broad spectrum antibiotics currently with narrowing pending culture results; Nephrology consultation; A1c, TSH with AM labs for review  "

## 2022-10-15 NOTE — PROGRESS NOTES
"[facility] Medicine  Progress Note    Patient Name: Be Shipman  MRN: [id number]  Patient Class: IP- Inpatient   Admission Date: 10/14/2022  Length of Stay: 1 days  Attending Physician: Estuadro Avila MD  Primary Care Provider: Werner Patel NP        Subjective:     Principal Problem:Acute renal failure superimposed on stage 5 chronic kidney disease, not on chronic dialysis        HPI:  57 year old male with history of HTN, HLD, Morbid Obesity presented to ED complaining of pain, swelling and redness of LLE X 3 days, progressively worsening in swelling and redness. Is 4-8/10 painful (aching), waxing and waning of own accord, but does worsen with usage of limb, with radiations towards groin. Has been taking "A lot" of ibuprofen for the pain. Was hypotensive (90's) coming through the door, which has responded to volume.    In ED: labs reviewed and noted below: leukocytosis with minimal normocytic anemia; hyponatremia, hypokalemia (treated in ED) with stage 4/5 renal dysfunction (no prior history); lactate is normal. UA: mild protein and hematuria; nitrite neg. Cultured in ED. Tib/Fib films reviewed: no bony path. US leg: no DVT. Telemetry reviewed: sinus    Discussed with ED MD: Inpatient admission; culture as above; volume; hold nephrotoxins (ARB, Tenoretic, NSAID's), prn analgesia; hold hypertensives currently using hydralazine per prn orders instead; broad spectrum antibiotics currently with narrowing pending culture results; Nephrology consultation; A1c, TSH with AM labs for review      Overview/Hospital Course:  Seen and examined   Patient reported diarrheal episodes recently and also was taking ibuprofen for his left leg swelling.   Ultrasound legs negative for DVT. BRENDAN worsen .         Interval History:     Review of Systems  As per subjective  Objective:     Vital Signs (Most Recent):  Temp: 97.5 °F (36.4 °C) (10/15/22 1242)  Pulse: 81 (10/15/22 1242)  Resp: 17 (10/15/22 0820)  BP: " 115/69 (10/15/22 1242)  SpO2: 95 % (10/15/22 1242) Vital Signs (24h Range):  Temp:  [97.5 °F (36.4 °C)-98.9 °F (37.2 °C)] 97.5 °F (36.4 °C)  Pulse:  [69-92] 81  Resp:  [17-18] 17  SpO2:  [93 %-96 %] 95 %  BP: ()/(39-71) 115/69     Weight: (!) 159.2 kg (350 lb 15.6 oz)  Body mass index is 48.95 kg/m².    Intake/Output Summary (Last 24 hours) at 10/15/2022 1520  Last data filed at 10/15/2022 1305  Gross per 24 hour   Intake 890 ml   Output --   Net 890 ml      Physical Exam  Constitutional:       General: He is not in acute distress.     Appearance: He is well-developed.   HENT:      Head: Normocephalic.   Eyes:      Pupils: Pupils are equal, round, and reactive to light.   Cardiovascular:      Rate and Rhythm: Normal rate and regular rhythm.   Pulmonary:      Effort: Pulmonary effort is normal. No respiratory distress.   Abdominal:      General: Abdomen is flat. There is no distension.      Tenderness: There is no abdominal tenderness.   Musculoskeletal:      Cervical back: Neck supple.   Skin:     Findings: No rash.   Neurological:      Mental Status: He is alert and oriented to person, place, and time.   Psychiatric:         Mood and Affect: Mood normal.       Significant Labs: All pertinent labs within the past 24 hours have been reviewed.  Cardiac Markers: No results for input(s): CKMB, MYOGLOBIN, BNP, TROPISTAT in the last 48 hours.  Coagulation: No results for input(s): PT, INR, APTT in the last 48 hours.  Lactic Acid:   Recent Labs   Lab 10/14/22  1825   LACTATE 1.4       Significant Imaging: I have reviewed all pertinent imaging results/findings within the past 24 hours.      Assessment/Plan:      Active Hospital Problems    Diagnosis    *Acute renal failure superimposed on stage 4 chronic kidney disease, not on chronic dialysis    Morbid obesity    Cellulitis of left leg    ELLIE (obstructive sleep apnea) cannot tolerate cpap    S/P coronary artery stent placement - LAD 08/09/2019     Hyperlipidemia, mixed    Essential hypertension, benign    Alcohol abuse, daily use       PLAN   vanco renal dose and changed to cefepime  IVF RL   Elevated the legs , appear also with venous stasis   Follow culture  Monitor WBC   IV KCL replacement and PO potassium most likely diuretics induced   Close monitor K   Put 20 meq in IVF   Diarrhea has stopped as per pt   Rheumatology serology given proteinuria and microhematuria   US renal negative for obstruction .     VTE Risk Mitigation (From admission, onward)         Ordered     enoxaparin injection 40 mg  Daily         10/15/22 0116     IP VTE HIGH RISK PATIENT  Once         10/15/22 0109     Place sequential compression device  Until discontinued         10/15/22 0109                Discharge Planning   QUINTIN:      Code Status: Full Code   Is the patient medically ready for discharge?:     Reason for patient still in hospital (select all that apply): Treatment                     Estuardo Avila MD  Department of Hospital Medicine   [facility]

## 2022-10-15 NOTE — SUBJECTIVE & OBJECTIVE
Interval History:     Review of Systems  As per subjective  Objective:     Vital Signs (Most Recent):  Temp: 97.5 °F (36.4 °C) (10/15/22 1242)  Pulse: 81 (10/15/22 1242)  Resp: 17 (10/15/22 0820)  BP: 115/69 (10/15/22 1242)  SpO2: 95 % (10/15/22 1242) Vital Signs (24h Range):  Temp:  [97.5 °F (36.4 °C)-98.9 °F (37.2 °C)] 97.5 °F (36.4 °C)  Pulse:  [69-92] 81  Resp:  [17-18] 17  SpO2:  [93 %-96 %] 95 %  BP: ()/(39-71) 115/69     Weight: (!) 159.2 kg (350 lb 15.6 oz)  Body mass index is 48.95 kg/m².    Intake/Output Summary (Last 24 hours) at 10/15/2022 1520  Last data filed at 10/15/2022 1305  Gross per 24 hour   Intake 890 ml   Output --   Net 890 ml      Physical Exam  Constitutional:       General: He is not in acute distress.     Appearance: He is well-developed.   HENT:      Head: Normocephalic.   Eyes:      Pupils: Pupils are equal, round, and reactive to light.   Cardiovascular:      Rate and Rhythm: Normal rate and regular rhythm.   Pulmonary:      Effort: Pulmonary effort is normal. No respiratory distress.   Abdominal:      General: Abdomen is flat. There is no distension.      Tenderness: There is no abdominal tenderness.   Musculoskeletal:      Cervical back: Neck supple.   Skin:     Findings: No rash.   Neurological:      Mental Status: He is alert and oriented to person, place, and time.   Psychiatric:         Mood and Affect: Mood normal.       Significant Labs: All pertinent labs within the past 24 hours have been reviewed.  Cardiac Markers: No results for input(s): CKMB, MYOGLOBIN, BNP, TROPISTAT in the last 48 hours.  Coagulation: No results for input(s): PT, INR, APTT in the last 48 hours.  Lactic Acid:   Recent Labs   Lab 10/14/22  1825   LACTATE 1.4       Significant Imaging: I have reviewed all pertinent imaging results/findings within the past 24 hours.

## 2022-10-15 NOTE — SUBJECTIVE & OBJECTIVE
Past Medical History:   Diagnosis Date    Hyperlipidemia     Hypertension     Morbid obesity        Past Surgical History:   Procedure Laterality Date    HAND SURGERY Left     20 y/o, laceration repair    HERNIA REPAIR      umbilical hernia    INCISION AND DRAINAGE FOOT Right 9/13/2021    Procedure: INCISION AND DRAINAGE, FOOT;  Surgeon: Juan Jose Julio DPM;  Location: [facility] OR;  Service: Podiatry;  Laterality: Right;    left breast tumor removed (benign)       LEFT HEART CATHETERIZATION Left 8/9/2019    Procedure: Left heart cath;  Surgeon: Erik Jessica MD;  Location: [facility] CATH;  Service: Cardiology;  Laterality: Left;       Review of patient's allergies indicates:   Allergen Reactions    Tetanus vaccines and toxoid Anaphylaxis, Hives and Rash       No current facility-administered medications on file prior to encounter.     Current Outpatient Medications on File Prior to Encounter   Medication Sig    allopurinoL (ZYLOPRIM) 300 MG tablet TAKE 1 TABLET BY MOUTH EVERY DAY (Patient taking differently: Take 300 mg by mouth once daily.)    amLODIPine (NORVASC) 10 MG tablet Take 1 tablet (10 mg total) by mouth once daily.    atenoloL (TENORMIN) 50 MG tablet Take 50 mg by mouth every evening.    atenoloL-chlorthalidone (TENORETIC) 100-25 mg per tablet Take 1 tablet by mouth once daily.    atorvastatin (LIPITOR) 40 MG tablet TAKE 1 TABLET BY MOUTH EVERY DAY (Patient taking differently: Take 40 mg by mouth once daily.)    cloNIDine (CATAPRES) 0.1 MG tablet Take 1 tablet (0.1 mg total) by mouth 2 (two) times daily.    clopidogreL (PLAVIX) 75 mg tablet TAKE 1 TABLET BY MOUTH EVERY DAY (Patient taking differently: Take 75 mg by mouth once daily.)    esomeprazole (NEXIUM) 40 MG capsule Take 40 mg by mouth before breakfast.    furosemide (LASIX) 20 MG tablet Take 1 tablet (20 mg total) by mouth once daily.    olmesartan (BENICAR) 40 MG tablet Take 1 tablet (40 mg total) by mouth once daily.    potassium chloride SA  (K-DUR,KLOR-CON) 20 MEQ tablet TAKE 1 TABLET BY MOUTH EVERY DAY (Patient taking differently: Take 20 mEq by mouth once daily.)    metFORMIN (GLUCOPHAGE-XR) 500 MG ER 24hr tablet TAKE 1 TABLET BY MOUTH EVERY DAY WITH BREAKFAST (Patient taking differently: Take 500 mg by mouth daily with breakfast.)    nitroGLYCERIN (NITROSTAT) 0.4 MG SL tablet Place 1 tablet (0.4 mg total) under the tongue every 5 (five) minutes as needed for Chest pain.    [DISCONTINUED] atenoloL (TENORMIN) 100 MG tablet Take 1 tablet (100 mg total) by mouth once daily.     Family History       Problem Relation (Age of Onset)    Cancer Sister    Heart disease Mother, Sister (50), Brother, Brother, Brother    Hypertension Mother    Stroke Brother          Tobacco Use    Smoking status: Former    Smokeless tobacco: Former     Quit date: 7/22/2000    Tobacco comments:     quit 12 years ago   Substance and Sexual Activity    Alcohol use: Yes     Comment: every day 1/2 a fifth of whiskey per day    Drug use: No    Sexual activity: Yes     Review of Systems   Constitutional: Negative.    HENT: Negative.     Eyes: Negative.    Respiratory: Negative.     Cardiovascular: Negative.    Gastrointestinal: Negative.    Endocrine: Negative.    Genitourinary: Negative.    Musculoskeletal: Negative.    Skin:  Positive for color change and rash.   Allergic/Immunologic: Negative.    Neurological: Negative.    Hematological: Negative.    All other systems reviewed and are negative.  Objective:     Vital Signs (Most Recent):  Temp: 98.9 °F (37.2 °C) (10/14/22 1603)  Pulse: 86 (10/14/22 1901)  Resp: 18 (10/14/22 1603)  BP: (!) 109/54 (10/14/22 1901)  SpO2: 95 % (10/14/22 1749)   Vital Signs (24h Range):  Temp:  [98.9 °F (37.2 °C)] 98.9 °F (37.2 °C)  Pulse:  [84-92] 86  Resp:  [18] 18  SpO2:  [95 %-96 %] 95 %  BP: ()/(52-71) 109/54     Weight: (!) 155.6 kg (343 lb)  Body mass index is 47.84 kg/m².    Physical Exam  Vitals and nursing note reviewed.   Constitutional:        Appearance: He is well-developed. He is obese.   HENT:      Head: Normocephalic and atraumatic.      Right Ear: External ear normal.      Left Ear: External ear normal.      Nose: Nose normal.   Eyes:      Conjunctiva/sclera: Conjunctivae normal.      Pupils: Pupils are equal, round, and reactive to light.   Cardiovascular:      Rate and Rhythm: Normal rate and regular rhythm.      Heart sounds: Normal heart sounds.   Pulmonary:      Effort: Pulmonary effort is normal.      Breath sounds: Normal breath sounds.      Comments: Decreased entry without adventitious sounds  Abdominal:      General: Bowel sounds are normal.      Palpations: Abdomen is soft.   Musculoskeletal:         General: Normal range of motion.      Cervical back: Normal range of motion and neck supple.   Skin:     General: Skin is warm and dry.      Capillary Refill: Capillary refill takes less than 2 seconds.      Comments: LLE: 2+ edema, erythema, calor, and tenderness to palpation   Neurological:      Mental Status: He is alert and oriented to person, place, and time.   Psychiatric:         Behavior: Behavior normal.         Thought Content: Thought content normal.         Judgment: Judgment normal.         CRANIAL NERVES     CN III, IV, VI   Pupils are equal, round, and reactive to light.     Significant Labs: All pertinent labs within the past 24 hours have been reviewed.  CBC:   Recent Labs   Lab 10/14/22  1615   WBC 18.73*   HGB 11.8*   HCT 35.2*        CMP:   Recent Labs   Lab 10/14/22  1615   *   K 2.6*   CL 91*   CO2 25   *   BUN 55*   CREATININE 4.7*   CALCIUM 8.4*   PROT 7.7   ALBUMIN 3.1*   BILITOT 1.4*   ALKPHOS 124   AST 86*   ALT 43   ANIONGAP 13     Troponin: No results for input(s): TROPONINI, TROPONINIHS in the last 48 hours.    Significant Imaging: I have reviewed all pertinent imaging results/findings within the past 24 hours.

## 2022-10-15 NOTE — PROGRESS NOTES
Pharmacokinetic Initial Assessment: IV Vancomycin    Assessment/Plan:    Initiate intravenous vancomycin with loading dose of 2000 mg once with subsequent doses when random concentrations are less than 20 mcg/mL  Desired empiric serum trough concentration is 10 to 15 mcg/mL  Draw vancomycin random level on 10/16 at 0300.  Pharmacy will continue to follow and monitor vancomycin.      Please contact pharmacy at extension [phone] with any questions regarding this assessment.     Thank you for the consult,   Rell Christopher       Patient brief summary:  Be Shipman is a 57 y.o. male initiated on antimicrobial therapy with IV Vancomycin for treatment of suspected skin & soft tissue infection    Drug Allergies:   Review of patient's allergies indicates:   Allergen Reactions    Tetanus vaccines and toxoid Anaphylaxis, Hives and Rash       Actual Body Weight:   155.6 kg    Renal Function:   Estimated Creatinine Clearance: 26.7 mL/min (A) (based on SCr of 4.7 mg/dL (H)).,     CBC (last 72 hours):  Recent Labs   Lab Result Units 10/14/22  1615   WBC K/uL 18.73*   Hemoglobin g/dL 11.8*   Hematocrit % 35.2*   Platelets K/uL 190   Gran % % 77.9*   Lymph % % 10.8*   Mono % % 8.5   Eosinophil % % 0.2   Basophil % % 0.3   Differential Method  Automated       Metabolic Panel (last 72 hours):  Recent Labs   Lab Result Units 10/14/22  1615 10/14/22  1649   Sodium mmol/L 129*  --    Potassium mmol/L 2.6*  --    Chloride mmol/L 91*  --    CO2 mmol/L 25  --    Glucose mg/dL 156*  --    Glucose, UA   --  Negative   BUN mg/dL 55*  --    Creatinine mg/dL 4.7*  --    Albumin g/dL 3.1*  --    Total Bilirubin mg/dL 1.4*  --    Alkaline Phosphatase U/L 124  --    AST U/L 86*  --    ALT U/L 43  --        Drug levels (last 3 results):  No results for input(s): VANCOMYCINRA, VANCORANDOM, VANCOMYCINPE, VANCOPEAK, VANCOMYCINTR, VANCOTROUGH in the last 72 hours.    Microbiologic Results:  Microbiology Results (last 7 days)       Procedure Component Value  Units Date/Time    Blood culture #2 **CANNOT BE ORDERED STAT** [[id number]] Collected: 10/14/22 1643    Order Status: Completed Specimen: Blood from Peripheral, Antecubital, Right Updated: 10/14/22 2358     Blood Culture, Routine No Growth to date    Blood culture #1 **CANNOT BE ORDERED STAT** [[id number]] Collected: 10/14/22 1615    Order Status: Completed Specimen: Blood from Peripheral, Forearm, Left Updated: 10/14/22 2317     Blood Culture, Routine No Growth to date    Urine culture [[id number]] Collected: 10/14/22 1649    Order Status: No result Specimen: Urine Updated: 10/14/22 5231

## 2022-10-15 NOTE — HOSPITAL COURSE
Seen and examined   Patient reported diarrheal episodes recently and also was taking ibuprofen for his left leg swelling.   Ultrasound legs negative for DVT. BRENDAN worsen .     10/16  Admitted with severe BRENDAN secondary to gastroenteritis and also with left leg severe cellulitis  Today   Patient seen and examined.  His left leg acutely worsened with more poorly and tense and spreading cellulitis above knee  CT ordered and no abscess and no gas seen .  D/w Dr Rivas and consulted .  Creatinine back to 1.9    Assumed care of this patient on 10/17/22.  Patient with a history of obesity with BMI 49, ELLIE not using CPAP, diabetes mellitus, hypertension, hyperlipidemia, CAD status post PCI, he presented with 2 day history of worsening left lower extremity pain, swelling and redness, denies any preceding trauma or injury.  States he did have nausea, vomiting and diarrhea which he attributes to gastroenteritis from chicken.  He denies any NSAID use, states he was taking antihistamines at home.  On presentation noted to have leukocytosis with WBC 18, acute kidney injury creatinine 4.7, hypokalemia with potassium 2.6, hyponatremia with sodium 129.  Clinical evidence of cellulitis to left lower extremity, ultrasound no DVT, CT with extensive edema, no soft tissue gas, no foreign body, no definite abscess collection.  He was admitted started on vancomycin and cefepime with Infectious Disease consultation.  Nephrology was also consulted for acute kidney injury, ultrasound no evidence of obstruction, nephrotoxic medications were held, IV fluid hydration.  On 10/17 status post I&D OR by , no purulent see noted, serosanguineous fluid, OR cultures submitted, suspect will have bruising/bloody discharge. Acute kidney injury has resolved. Antibiotics with de-escalated to Ancef.  Local wound care was performed. Blood pressure medications were slowly resumed.  Penrose drain from the leg was pulled on 10/19 by General  surgery.  Patient continues to improve, discussed with Infectious Disease and cleared for discharge with ongoing wound care and one week of Zyvox.  Home health was ordered for continue wound care.  Patient will follow-up with primary care provider and Infectious Disease.  On day of discharge WBC count down to 14, CRP 11.  Appears medically stable for discharge.  Discharge plan including medication, follow-up as well as return precautions were reviewed, he expressed understanding, no questions or concerns.  Discussed with nursing on day of discharge.  Discussed with case management on day of discharge.      Discharge examination   Lying in bed alert, oriented, regular heart rhythm, on room air, dressing to the left lower extremity

## 2022-10-15 NOTE — H&P
"[facility] - Emergency Dept  Hospital Medicine  History & Physical    Patient Name: Be Shipman  MRN: [id number]  Patient Class: IP- Inpatient  Admission Date: 10/14/2022  Attending Physician: Jeb Osorio MD  Primary Care Provider: Werner Patel NP         Patient information was obtained from patient, spouse/SO, past medical records, ER records and ED MD.     Subjective:     Principal Problem:Acute renal failure superimposed on stage 5 chronic kidney disease, not on chronic dialysis    Chief Complaint:   Chief Complaint   Patient presents with    Leg Swelling     LLE swelling and weeping, redness x2 days ago.         HPI: 57 year old male with history of HTN, HLD, Morbid Obesity presented to ED complaining of pain, swelling and redness of LLE X 3 days, progressively worsening in swelling and redness. Is 4-8/10 painful (aching), waxing and waning of own accord, but does worsen with usage of limb, with radiations towards groin. Has been taking "A lot" of ibuprofen for the pain. Was hypotensive (90's) coming through the door, which has responded to volume.    In ED: labs reviewed and noted below: leukocytosis with minimal normocytic anemia; hyponatremia, hypokalemia (treated in ED) with stage 4/5 renal dysfunction (no prior history); lactate is normal. UA: mild protein and hematuria; nitrite neg. Cultured in ED. Tib/Fib films reviewed: no bony path. US leg: no DVT. Telemetry reviewed: sinus    Discussed with ED MD: Inpatient admission; culture as above; volume; hold nephrotoxins (ARB, Tenoretic, NSAID's), prn analgesia; hold hypertensives currently using hydralazine per prn orders instead; broad spectrum antibiotics currently with narrowing pending culture results; Nephrology consultation; A1c, TSH with AM labs for review      Past Medical History:   Diagnosis Date    Hyperlipidemia     Hypertension     Morbid obesity        Past Surgical History:   Procedure Laterality Date    HAND SURGERY " Left     20 y/o, laceration repair    HERNIA REPAIR      umbilical hernia    INCISION AND DRAINAGE FOOT Right 9/13/2021    Procedure: INCISION AND DRAINAGE, FOOT;  Surgeon: Juan Jose Julio DPM;  Location: [facility] OR;  Service: Podiatry;  Laterality: Right;    left breast tumor removed (benign)       LEFT HEART CATHETERIZATION Left 8/9/2019    Procedure: Left heart cath;  Surgeon: Erik Jessica MD;  Location: [facility] CATH;  Service: Cardiology;  Laterality: Left;       Review of patient's allergies indicates:   Allergen Reactions    Tetanus vaccines and toxoid Anaphylaxis, Hives and Rash       No current facility-administered medications on file prior to encounter.     Current Outpatient Medications on File Prior to Encounter   Medication Sig    allopurinoL (ZYLOPRIM) 300 MG tablet TAKE 1 TABLET BY MOUTH EVERY DAY (Patient taking differently: Take 300 mg by mouth once daily.)    amLODIPine (NORVASC) 10 MG tablet Take 1 tablet (10 mg total) by mouth once daily.    atenoloL (TENORMIN) 50 MG tablet Take 50 mg by mouth every evening.    atenoloL-chlorthalidone (TENORETIC) 100-25 mg per tablet Take 1 tablet by mouth once daily.    atorvastatin (LIPITOR) 40 MG tablet TAKE 1 TABLET BY MOUTH EVERY DAY (Patient taking differently: Take 40 mg by mouth once daily.)    cloNIDine (CATAPRES) 0.1 MG tablet Take 1 tablet (0.1 mg total) by mouth 2 (two) times daily.    clopidogreL (PLAVIX) 75 mg tablet TAKE 1 TABLET BY MOUTH EVERY DAY (Patient taking differently: Take 75 mg by mouth once daily.)    esomeprazole (NEXIUM) 40 MG capsule Take 40 mg by mouth before breakfast.    furosemide (LASIX) 20 MG tablet Take 1 tablet (20 mg total) by mouth once daily.    olmesartan (BENICAR) 40 MG tablet Take 1 tablet (40 mg total) by mouth once daily.    potassium chloride SA (K-DUR,KLOR-CON) 20 MEQ tablet TAKE 1 TABLET BY MOUTH EVERY DAY (Patient taking differently: Take 20 mEq by mouth once daily.)    metFORMIN (GLUCOPHAGE-XR)  500 MG ER 24hr tablet TAKE 1 TABLET BY MOUTH EVERY DAY WITH BREAKFAST (Patient taking differently: Take 500 mg by mouth daily with breakfast.)    nitroGLYCERIN (NITROSTAT) 0.4 MG SL tablet Place 1 tablet (0.4 mg total) under the tongue every 5 (five) minutes as needed for Chest pain.    [DISCONTINUED] atenoloL (TENORMIN) 100 MG tablet Take 1 tablet (100 mg total) by mouth once daily.     Family History       Problem Relation (Age of Onset)    Cancer Sister    Heart disease Mother, Sister (50), Brother, Brother, Brother    Hypertension Mother    Stroke Brother          Tobacco Use    Smoking status: Former    Smokeless tobacco: Former     Quit date: 7/22/2000    Tobacco comments:     quit 12 years ago   Substance and Sexual Activity    Alcohol use: Yes     Comment: every day 1/2 a fifth of whiskey per day    Drug use: No    Sexual activity: Yes     Review of Systems   Constitutional: Negative.    HENT: Negative.     Eyes: Negative.    Respiratory: Negative.     Cardiovascular: Negative.    Gastrointestinal: Negative.    Endocrine: Negative.    Genitourinary: Negative.    Musculoskeletal: Negative.    Skin:  Positive for color change and rash.   Allergic/Immunologic: Negative.    Neurological: Negative.    Hematological: Negative.    All other systems reviewed and are negative.  Objective:     Vital Signs (Most Recent):  Temp: 98.9 °F (37.2 °C) (10/14/22 1603)  Pulse: 86 (10/14/22 1901)  Resp: 18 (10/14/22 1603)  BP: (!) 109/54 (10/14/22 1901)  SpO2: 95 % (10/14/22 1749)   Vital Signs (24h Range):  Temp:  [98.9 °F (37.2 °C)] 98.9 °F (37.2 °C)  Pulse:  [84-92] 86  Resp:  [18] 18  SpO2:  [95 %-96 %] 95 %  BP: ()/(52-71) 109/54     Weight: (!) 155.6 kg (343 lb)  Body mass index is 47.84 kg/m².    Physical Exam  Vitals and nursing note reviewed.   Constitutional:       Appearance: He is well-developed. He is obese.   HENT:      Head: Normocephalic and atraumatic.      Right Ear: External ear normal.       Left Ear: External ear normal.      Nose: Nose normal.   Eyes:      Conjunctiva/sclera: Conjunctivae normal.      Pupils: Pupils are equal, round, and reactive to light.   Cardiovascular:      Rate and Rhythm: Normal rate and regular rhythm.      Heart sounds: Normal heart sounds.   Pulmonary:      Effort: Pulmonary effort is normal.      Breath sounds: Normal breath sounds.      Comments: Decreased entry without adventitious sounds  Abdominal:      General: Bowel sounds are normal.      Palpations: Abdomen is soft.   Musculoskeletal:         General: Normal range of motion.      Cervical back: Normal range of motion and neck supple.   Skin:     General: Skin is warm and dry.      Capillary Refill: Capillary refill takes less than 2 seconds.      Comments: LLE: 2+ edema, erythema, calor, and tenderness to palpation   Neurological:      Mental Status: He is alert and oriented to person, place, and time.   Psychiatric:         Behavior: Behavior normal.         Thought Content: Thought content normal.         Judgment: Judgment normal.         CRANIAL NERVES     CN III, IV, VI   Pupils are equal, round, and reactive to light.     Significant Labs: All pertinent labs within the past 24 hours have been reviewed.  CBC:   Recent Labs   Lab 10/14/22  1615   WBC 18.73*   HGB 11.8*   HCT 35.2*        CMP:   Recent Labs   Lab 10/14/22  1615   *   K 2.6*   CL 91*   CO2 25   *   BUN 55*   CREATININE 4.7*   CALCIUM 8.4*   PROT 7.7   ALBUMIN 3.1*   BILITOT 1.4*   ALKPHOS 124   AST 86*   ALT 43   ANIONGAP 13     Troponin: No results for input(s): TROPONINI, TROPONINIHS in the last 48 hours.    Significant Imaging: I have reviewed all pertinent imaging results/findings within the past 24 hours.    Assessment/Plan:     * Acute renal failure superimposed on stage 5 chronic kidney disease, not on chronic dialysis  Inpatient admission; culture as above; volume; hold nephrotoxins (ARB, Tenoretic, NSAID's), prn  analgesia; hold hypertensives currently using hydralazine per prn orders instead; broad spectrum antibiotics currently, with narrowing spectrum  pending culture results; Nephrology consultation; A1c, TSH with AM labs for review      Cellulitis of left leg  Inpatient admission; culture as above; volume; hold nephrotoxins (ARB, Tenoretic, NSAID's), prn analgesia; hold hypertensives currently using hydralazine per prn orders instead; broad spectrum antibiotics currently, with narrowing spectrum  pending culture results; Nephrology consultation; A1c, TSH with AM labs for review      Essential hypertension, benign  Inpatient admission; culture as above; volume; hold nephrotoxins (ARB, Tenoretic, NSAID's), prn analgesia; hold hypertensives currently using hydralazine per prn orders instead; broad spectrum antibiotics currently, with narrowing spectrum  pending culture results; Nephrology consultation; A1c, TSH with AM labs for review        VTE Risk Mitigation (From admission, onward)    None             Jeb Osorio MD  Department of Hospital Medicine   [facility] - Emergency Dept

## 2022-10-15 NOTE — PLAN OF CARE
Problem: Adult Inpatient Plan of Care  Goal: Plan of Care Review  Outcome: Ongoing, Progressing  Goal: Absence of Hospital-Acquired Illness or Injury  Outcome: Ongoing, Progressing  Goal: Optimal Comfort and Wellbeing  Outcome: Ongoing, Progressing  Goal: Readiness for Transition of Care  Outcome: Ongoing, Progressing     Problem: Bariatric Environmental Safety  Goal: Safety Maintained with Care  Outcome: Ongoing, Progressing     Problem: Fluid and Electrolyte Imbalance (Acute Kidney Injury/Impairment)  Goal: Fluid and Electrolyte Balance  Outcome: Ongoing, Progressing     Problem: Skin Injury Risk Increased  Goal: Skin Health and Integrity  Outcome: Ongoing, Progressing

## 2022-10-16 PROBLEM — D50.9 IRON DEFICIENCY ANEMIA: Status: ACTIVE | Noted: 2022-10-16

## 2022-10-16 PROBLEM — A41.9 SEPSIS: Status: ACTIVE | Noted: 2022-10-16

## 2022-10-16 PROBLEM — R60.9 SWELLING: Status: ACTIVE | Noted: 2022-10-16

## 2022-10-16 LAB
ALBUMIN SERPL BCP-MCNC: 2.6 G/DL (ref 3.5–5.2)
ALP SERPL-CCNC: 196 U/L (ref 55–135)
ALT SERPL W/O P-5'-P-CCNC: 62 U/L (ref 10–44)
ANION GAP SERPL CALC-SCNC: 11 MMOL/L (ref 8–16)
AST SERPL-CCNC: 97 U/L (ref 10–40)
BACTERIA UR CULT: NORMAL
BACTERIA UR CULT: NORMAL
BASOPHILS NFR BLD: 0 % (ref 0–1.9)
BASOPHILS NFR BLD: 0 % (ref 0–1.9)
BILIRUB SERPL-MCNC: 1.1 MG/DL (ref 0.1–1)
BNP SERPL-MCNC: 127 PG/ML (ref 0–99)
BUN SERPL-MCNC: 54 MG/DL (ref 6–20)
CALCIUM SERPL-MCNC: 8.6 MG/DL (ref 8.7–10.5)
CHLORIDE SERPL-SCNC: 98 MMOL/L (ref 95–110)
CO2 SERPL-SCNC: 27 MMOL/L (ref 23–29)
CREAT SERPL-MCNC: 1.9 MG/DL (ref 0.5–1.4)
DIFFERENTIAL METHOD: ABNORMAL
DIFFERENTIAL METHOD: ABNORMAL
EOSINOPHIL NFR BLD: 4 % (ref 0–8)
EOSINOPHIL NFR BLD: 4 % (ref 0–8)
ERYTHROCYTE [DISTWIDTH] IN BLOOD BY AUTOMATED COUNT: 14.6 % (ref 11.5–14.5)
ERYTHROCYTE [DISTWIDTH] IN BLOOD BY AUTOMATED COUNT: 14.6 % (ref 11.5–14.5)
EST. GFR  (NO RACE VARIABLE): 40.9 ML/MIN/1.73 M^2
FERRITIN SERPL-MCNC: 471 NG/ML (ref 20–300)
GLUCOSE SERPL-MCNC: 153 MG/DL (ref 70–110)
HCT VFR BLD AUTO: 32.8 % (ref 40–54)
HCT VFR BLD AUTO: 32.8 % (ref 40–54)
HGB BLD-MCNC: 10.8 G/DL (ref 14–18)
HGB BLD-MCNC: 10.8 G/DL (ref 14–18)
IMM GRANULOCYTES # BLD AUTO: ABNORMAL K/UL (ref 0–0.04)
IMM GRANULOCYTES # BLD AUTO: ABNORMAL K/UL (ref 0–0.04)
IMM GRANULOCYTES NFR BLD AUTO: ABNORMAL % (ref 0–0.5)
IMM GRANULOCYTES NFR BLD AUTO: ABNORMAL % (ref 0–0.5)
IRON SERPL-MCNC: 13 UG/DL (ref 45–160)
LYMPHOCYTES NFR BLD: 11 % (ref 18–48)
LYMPHOCYTES NFR BLD: 11 % (ref 18–48)
MAGNESIUM SERPL-MCNC: 1.9 MG/DL (ref 1.6–2.6)
MCH RBC QN AUTO: 29.8 PG (ref 27–31)
MCH RBC QN AUTO: 29.8 PG (ref 27–31)
MCHC RBC AUTO-ENTMCNC: 32.9 G/DL (ref 32–36)
MCHC RBC AUTO-ENTMCNC: 32.9 G/DL (ref 32–36)
MCV RBC AUTO: 91 FL (ref 82–98)
MCV RBC AUTO: 91 FL (ref 82–98)
MONOCYTES NFR BLD: 4 % (ref 4–15)
MONOCYTES NFR BLD: 4 % (ref 4–15)
NEUTROPHILS NFR BLD: 81 % (ref 38–73)
NEUTROPHILS NFR BLD: 81 % (ref 38–73)
NRBC BLD-RTO: 0 /100 WBC
NRBC BLD-RTO: 0 /100 WBC
PLATELET # BLD AUTO: 234 K/UL (ref 150–450)
PLATELET # BLD AUTO: 234 K/UL (ref 150–450)
PLATELET BLD QL SMEAR: ABNORMAL
PLATELET BLD QL SMEAR: ABNORMAL
PMV BLD AUTO: 11 FL (ref 9.2–12.9)
PMV BLD AUTO: 11 FL (ref 9.2–12.9)
POTASSIUM SERPL-SCNC: 2.6 MMOL/L (ref 3.5–5.1)
PROT SERPL-MCNC: 6.7 G/DL (ref 6–8.4)
RBC # BLD AUTO: 3.62 M/UL (ref 4.6–6.2)
RBC # BLD AUTO: 3.62 M/UL (ref 4.6–6.2)
SATURATED IRON: 5 % (ref 20–50)
SODIUM SERPL-SCNC: 136 MMOL/L (ref 136–145)
TOTAL IRON BINDING CAPACITY: 279 UG/DL (ref 250–450)
TRANSFERRIN SERPL-MCNC: 199 MG/DL (ref 200–375)
URATE SERPL-MCNC: 8.9 MG/DL (ref 3.4–7)
VANCOMYCIN SERPL-MCNC: 7.4 UG/ML
WBC # BLD AUTO: 17.32 K/UL (ref 3.9–12.7)
WBC # BLD AUTO: 17.32 K/UL (ref 3.9–12.7)

## 2022-10-16 PROCEDURE — 86704 HEP B CORE ANTIBODY TOTAL: CPT | Performed by: INTERNAL MEDICINE

## 2022-10-16 PROCEDURE — 80053 COMPREHEN METABOLIC PANEL: CPT | Performed by: INTERNAL MEDICINE

## 2022-10-16 PROCEDURE — 63600175 PHARM REV CODE 636 W HCPCS: Performed by: INTERNAL MEDICINE

## 2022-10-16 PROCEDURE — 84550 ASSAY OF BLOOD/URIC ACID: CPT | Performed by: INTERNAL MEDICINE

## 2022-10-16 PROCEDURE — 83880 ASSAY OF NATRIURETIC PEPTIDE: CPT | Performed by: INTERNAL MEDICINE

## 2022-10-16 PROCEDURE — 84466 ASSAY OF TRANSFERRIN: CPT | Performed by: INTERNAL MEDICINE

## 2022-10-16 PROCEDURE — 36415 COLL VENOUS BLD VENIPUNCTURE: CPT | Performed by: INTERNAL MEDICINE

## 2022-10-16 PROCEDURE — 99223 PR INITIAL HOSPITAL CARE,LEVL III: ICD-10-PCS | Mod: ,,, | Performed by: INTERNAL MEDICINE

## 2022-10-16 PROCEDURE — 12000002 HC ACUTE/MED SURGE SEMI-PRIVATE ROOM

## 2022-10-16 PROCEDURE — 87075 CULTR BACTERIA EXCEPT BLOOD: CPT | Performed by: INTERNAL MEDICINE

## 2022-10-16 PROCEDURE — 25000003 PHARM REV CODE 250: Performed by: INTERNAL MEDICINE

## 2022-10-16 PROCEDURE — 83735 ASSAY OF MAGNESIUM: CPT | Performed by: INTERNAL MEDICINE

## 2022-10-16 PROCEDURE — 86706 HEP B SURFACE ANTIBODY: CPT | Performed by: INTERNAL MEDICINE

## 2022-10-16 PROCEDURE — 85027 COMPLETE CBC AUTOMATED: CPT | Performed by: INTERNAL MEDICINE

## 2022-10-16 PROCEDURE — 87070 CULTURE OTHR SPECIMN AEROBIC: CPT | Performed by: INTERNAL MEDICINE

## 2022-10-16 PROCEDURE — 86038 ANTINUCLEAR ANTIBODIES: CPT | Performed by: INTERNAL MEDICINE

## 2022-10-16 PROCEDURE — 80202 ASSAY OF VANCOMYCIN: CPT | Performed by: INTERNAL MEDICINE

## 2022-10-16 PROCEDURE — 99223 1ST HOSP IP/OBS HIGH 75: CPT | Mod: ,,, | Performed by: INTERNAL MEDICINE

## 2022-10-16 PROCEDURE — 86160 COMPLEMENT ANTIGEN: CPT | Performed by: INTERNAL MEDICINE

## 2022-10-16 PROCEDURE — 82728 ASSAY OF FERRITIN: CPT | Performed by: INTERNAL MEDICINE

## 2022-10-16 PROCEDURE — 86160 COMPLEMENT ANTIGEN: CPT | Mod: 59 | Performed by: INTERNAL MEDICINE

## 2022-10-16 PROCEDURE — 84165 PROTEIN E-PHORESIS SERUM: CPT | Performed by: INTERNAL MEDICINE

## 2022-10-16 PROCEDURE — 87340 HEPATITIS B SURFACE AG IA: CPT | Performed by: INTERNAL MEDICINE

## 2022-10-16 PROCEDURE — 85007 BL SMEAR W/DIFF WBC COUNT: CPT | Performed by: INTERNAL MEDICINE

## 2022-10-16 RX ORDER — SILVER SULFADIAZINE 10 G/1000G
CREAM TOPICAL DAILY
Status: DISCONTINUED | OUTPATIENT
Start: 2022-10-17 | End: 2022-10-19

## 2022-10-16 RX ORDER — POTASSIUM CHLORIDE 7.45 MG/ML
10 INJECTION INTRAVENOUS ONCE
Status: COMPLETED | OUTPATIENT
Start: 2022-10-16 | End: 2022-10-16

## 2022-10-16 RX ORDER — LANOLIN ALCOHOL/MO/W.PET/CERES
1 CREAM (GRAM) TOPICAL DAILY
Status: DISCONTINUED | OUTPATIENT
Start: 2022-10-16 | End: 2022-10-20 | Stop reason: HOSPADM

## 2022-10-16 RX ORDER — SODIUM CHLORIDE AND POTASSIUM CHLORIDE 150; 900 MG/100ML; MG/100ML
INJECTION, SOLUTION INTRAVENOUS CONTINUOUS
Status: DISCONTINUED | OUTPATIENT
Start: 2022-10-16 | End: 2022-10-17

## 2022-10-16 RX ORDER — CLINDAMYCIN HYDROCHLORIDE 150 MG/1
300 CAPSULE ORAL EVERY 6 HOURS
Status: DISCONTINUED | OUTPATIENT
Start: 2022-10-17 | End: 2022-10-17

## 2022-10-16 RX ADMIN — CEFEPIME HYDROCHLORIDE 1 G: 1 INJECTION, SOLUTION INTRAVENOUS at 08:10

## 2022-10-16 RX ADMIN — PANTOPRAZOLE SODIUM 40 MG: 40 TABLET, DELAYED RELEASE ORAL at 05:10

## 2022-10-16 RX ADMIN — POTASSIUM CHLORIDE 40 MEQ: 1500 TABLET, EXTENDED RELEASE ORAL at 05:10

## 2022-10-16 RX ADMIN — FERROUS SULFATE TAB 325 MG (65 MG ELEMENTAL FE) 1 EACH: 325 (65 FE) TAB at 02:10

## 2022-10-16 RX ADMIN — POTASSIUM CHLORIDE AND SODIUM CHLORIDE: 900; 150 INJECTION, SOLUTION INTRAVENOUS at 01:10

## 2022-10-16 RX ADMIN — ATORVASTATIN CALCIUM 40 MG: 40 TABLET, FILM COATED ORAL at 08:10

## 2022-10-16 RX ADMIN — POTASSIUM CHLORIDE AND SODIUM CHLORIDE: 900; 150 INJECTION, SOLUTION INTRAVENOUS at 06:10

## 2022-10-16 RX ADMIN — ENOXAPARIN SODIUM 40 MG: 100 INJECTION SUBCUTANEOUS at 05:10

## 2022-10-16 RX ADMIN — POTASSIUM CHLORIDE 40 MEQ: 1500 TABLET, EXTENDED RELEASE ORAL at 09:10

## 2022-10-16 RX ADMIN — CLOPIDOGREL BISULFATE 75 MG: 75 TABLET, FILM COATED ORAL at 08:10

## 2022-10-16 RX ADMIN — POTASSIUM CHLORIDE 10 MEQ: 7.46 INJECTION, SOLUTION INTRAVENOUS at 09:10

## 2022-10-16 RX ADMIN — HYDROCODONE BITARTRATE AND ACETAMINOPHEN 1 TABLET: 5; 325 TABLET ORAL at 07:10

## 2022-10-16 RX ADMIN — VANCOMYCIN HYDROCHLORIDE 2000 MG: 500 INJECTION, POWDER, LYOPHILIZED, FOR SOLUTION INTRAVENOUS at 05:10

## 2022-10-16 NOTE — CONSULTS
Consult Note  Infectious Disease    Reason for Consult:      HPI: Be Shipman is a 57 y.o. male with past medical history of obesity, BMI 48, HTN, DLP, diet-controlled diabetes, hemoglobin A1c 6.7--6.3.  came to ED complaining of left lower extremity swelling redness and pain x3 days duration, progressively worse.  This happened to 3 days after he had some food poisoning after eating some chicken that set out for a day.      In ED, he was found hypotensive and responded to fluids.  Left leg X-ray ruled out foreign body; ultrasound ruled out DVT.  He was admitted and started on cefepime on vancomycin for cellulitis, today is day 3 of treatment.  Blood cultures are negative to date.  WBC is not changing much 18--19--17.  Nephrologist is following for BRENDAN.  Creatinine is improved 4.7--5.4--1.9.      I came and saw patient.  I cleaned his left leg.  Collected cultures from one of the blisters on the medial part of the left leg.  Patient is allergic to tetanus and of course is not up-to-date.    Antibiotics (From admission, onward)      Start     Stop Route Frequency Ordered    10/15/22 0830  cefepime in dextrose 5 % 1 gram/50 mL IVPB 1 g         -- IV Every 12 hours (non-standard times) 10/15/22 0727    10/15/22 0109  vancomycin - pharmacy to dose  (vancomycin IVPB)        See [facility] for full Linked Orders Report.    -- IV pharmacy to manage frequency 10/15/22 0109          Antifungals (From admission, onward)      None          Antivirals (From admission, onward)      None                  Review of patient's allergies indicates:   Allergen Reactions    Tetanus vaccines and toxoid Anaphylaxis, Hives and Rash     Past Medical History:   Diagnosis Date    Anticoagulant long-term use     Coronary artery disease     Diabetes mellitus     Hyperlipidemia     Hypertension     Morbid obesity      Past Surgical History:   Procedure Laterality Date    CORONARY STENT PLACEMENT  08/09/2019    pt has card in wallet, LAD  [facility] [address]    HAND SURGERY Left     20 y/o, laceration repair    HERNIA REPAIR      umbilical hernia    INCISION AND DRAINAGE FOOT Right 09/13/2021    Procedure: INCISION AND DRAINAGE, FOOT;  Surgeon: Juan Jose Julio DPM;  Location: [facility] OR;  Service: Podiatry;  Laterality: Right;    left breast tumor removed (benign)       LEFT HEART CATHETERIZATION Left 08/09/2019    Procedure: Left heart cath;  Surgeon: Erik Jessica MD;  Location: [facility] CATH;  Service: Cardiology;  Laterality: Left;     Social History     Socioeconomic History    Marital status:    Tobacco Use    Smoking status: Former    Smokeless tobacco: Former     Quit date: 7/22/2000    Tobacco comments:     quit 12 years ago   Substance and Sexual Activity    Alcohol use: Yes     Comment: every day 1/2 a fifth of whiskey per day    Drug use: No    Sexual activity: Yes     Social Determinants of Health     Financial Resource Strain: Low Risk     Difficulty of Paying Living Expenses: Not very hard   Food Insecurity: No Food Insecurity    Worried About Running Out of Food in the Last Year: Never true    Ran Out of Food in the Last Year: Never true   Transportation Needs: No Transportation Needs    Lack of Transportation (Medical): No    Lack of Transportation (Non-Medical): No   Physical Activity: Sufficiently Active    Days of Exercise per Week: 5 days    Minutes of Exercise per Session: 30 min   Stress: No Stress Concern Present    Feeling of Stress : Not at all   Social Connections: Moderately Isolated    Frequency of Communication with Friends and Family: More than three times a week    Frequency of Social Gatherings with Friends and Family: More than three times a week    Attends Mosque Services: Never    Active Member of Clubs or Organizations: No    Attends Club or Organization Meetings: Never    Marital Status:    Housing Stability: Low Risk     Unable to Pay for Housing in the Last Year: No    Number of Places Lived in  the Last Year: 1    Unstable Housing in the Last Year: No     Family History   Problem Relation Age of Onset    Heart disease Mother         CAD    Hypertension Mother     Cancer Sister         breast    Heart disease Sister 50    Heart disease Brother         MI    Stroke Brother     Heart disease Brother         MI    Heart disease Brother         MI         Review of Systems:   No chills, fever, sweats, weight loss  No change in vision, loss of vision or diplopia  No sinus congestion, purulent nasal discharge, post nasal drip or facial pain  No pain in mouth or throat. No problems with teeth, gums.  No chest pain, palpitations, syncope  No cough, sputum production, shortness of breath, dyspnea on exertion, pleurisy, hemoptysis  Positive for nausea vomiting times several days, precipitated by eating left over chicken.  Resolved.  No dysphagia, odynophagia  No dysuria, hematuria, strangury, retention, incontinence, nocturia, prostatism,   No vaginal/penile discharge, genital ulcers, risk for STD  No swelling of joints, redness of joints, injuries, or new focal pain  No unusual headaches, dizziness, vertigo, numbness, paresthesias, neuropathy, falls  No anxiety, depression, substance abuse, sleep disturbance   he denies diabetes but his hemoglobin A1c has been 6--6.7--6.3.  No bleeding, lymphadenopathy, anemia, malignancy, unusual bruising   left leg is always slightly red and swollen but this time around it became severely so.  No TB exposure  No recent/prior steroids  Outdoor activities:  None, no exposure to water.  He has cats but they do not scratch him or touch his leg  Travel:  No  Implants:   Antibiotic History:  Vancomycin and cefepime as above    EXAM & DIAGNOSTICS REVIEWED:   Vitals:     Temp:  [97.7 °F (36.5 °C)-99.1 °F (37.3 °C)]   Temp: 97.7 °F (36.5 °C) (10/16/22 1638)  Pulse: 83 (10/16/22 1638)  Resp: 18 (10/16/22 1638)  BP: 130/85 (10/16/22 1638)  SpO2: 98 % (10/16/22 1638)    Intake/Output Summary  (Last 24 hours) at 10/16/2022 1855  Last data filed at 10/16/2022 1639  Gross per 24 hour   Intake 2472.92 ml   Output 4650 ml   Net -2177.08 ml       General:  In NAD. Alert and attentive, cooperative, comfortable  Eyes:  Anicteric, PERRL, EOMI  ENT:  No ulcers, exudates, thrush, nares patent, dentition is fair  Neck:  supple, no masses or adenopathy appreciated  Lungs: Clear, no consolidation, rales, wheezes, rub  Heart:  RRR, no gallop/murmur/rub noted  Abd:  Soft, NT, ND, normal BS, no masses or organomegaly appreciated.  :  Voids, urine clear, no flank tenderness  Musc:  Joints without effusion, swelling, erythema, synovitis, muscle wasting.   Skin:  See picture No rashes. No palmar or plantar lesions. No subungual petechiae  Neuro:             Alert, attentive, speech fluent, face symmetric, moves all extremities, no focal weakness. Ambulatory  Psych:  Calm, cooperative  Lymphatic:     No cervical, supraclavicular, axillary, or inguinal nodes  Extrem: Left leg is swollen, red, tender to minimal movement.  No lymph node enlargement.  No yeast rash to groin.  Some tinea pedis present See pictures of left leg, otherwise No edema, erythema, phlebitis, cellulitis, warm and well perfused  VAD:       Isolation:    Wound:                 Lines/Tubes/Drains:    General Labs reviewed:  Recent Labs   Lab 10/14/22  1615 10/15/22  0525 10/16/22  0259   WBC 18.73* 19.19* 17.32*  17.32*   HGB 11.8* 10.4* 10.8*  10.8*   HCT 35.2* 31.3* 32.8*  32.8*    180 234  234       Recent Labs   Lab 10/14/22  1615 10/15/22  0525 10/16/22  0259   * 130* 136   K 2.6* 2.4* 2.6*   CL 91* 93* 98   CO2 25 25 27   BUN 55* 70* 54*   CREATININE 4.7* 5.4* 1.9*   CALCIUM 8.4* 8.3* 8.6*   PROT 7.7  --  6.7   BILITOT 1.4*  --  1.1*   ALKPHOS 124  --  196*   ALT 43  --  62*   AST 86*  --  97*     No results for input(s): CRP in the last 168 hours.      Micro:  Microbiology Results (last 7 days)       Procedure Component Value Units  "Date/Time    Blood culture #1 **CANNOT BE ORDERED STAT** [[id number]] Collected: 10/14/22 1615    Order Status: Completed Specimen: Blood from Peripheral, Forearm, Left Updated: 10/16/22 1832     Blood Culture, Routine No Growth to date      No Growth to date      No Growth to date    Blood culture #2 **CANNOT BE ORDERED STAT** [[id number]] Collected: 10/14/22 1643    Order Status: Completed Specimen: Blood from Peripheral, Antecubital, Right Updated: 10/16/22 1832     Blood Culture, Routine No Growth to date      No Growth to date      No Growth to date    Urine culture [[id number]] Collected: 10/14/22 1649    Order Status: Completed Specimen: Urine Updated: 10/16/22 0635     Urine Culture, Routine Multiple organisms isolated. None in predominance.  Repeat if      clinically necessary.    Narrative:      Specimen Source->Urine            Imaging Reviewed:  X-ray leg  Diffuse visualized left lower extremity subcutaneous edema suggested without acute bony abnormality.     Ultrasound of left leg negative for DVT     CT lower extremity  Severe extensive subcutaneous edema of the left lower extremity.   No acute osseous abnormality.   Negative for soft tissue gas.   Dermal lesion along the posterior medial aspect of the calf. Please correlate with physical exam.     Cardiology:  Reviewed EC HO in chart.  EF 58%.  PASP 27.  Mild mitral regurg, mild tricuspid regurg,    IMPRESSION & PLAN     Severe Left lower extremity cellulitis, probably due to Streptococcus  Tinea pedis, local treatment   Pyuria on admission, WBC of 32; no complaints  Recent "food poisoning"  BRENDAN, dehydration, improved  Probable diabetes, needs to be on diabetic diet.  Patient denies having diabetes to me.    Past medical history of obesity, BMI 48, HTN, D LP    Recommendations:  Continue vancomycin   Discontinue cefepime   Add clindamycin   Can not give Tdap because of history of allergic reaction   Elevate leg   Clean left leg, which I did today, wiping " as much of dead skin as possible.  If he does not improve will have to repeat imaging, probably MRI  Tight glucose control.    Follow cultures I obtained today from one of the left leg blisters     Medical Decision Making during this encounter was  [_] Low Complexity  [_] Moderate Complexity  [ xx ] High Complexity

## 2022-10-16 NOTE — PROGRESS NOTES
"[facility] Medicine  Progress Note    Patient Name: Be Shipman  MRN: [id number]  Patient Class: IP- Inpatient   Admission Date: 10/14/2022  Length of Stay: 2 days  Attending Physician: Estuardo Avila MD  Primary Care Provider: Werner Patel NP        Subjective:     Principal Problem:Acute renal failure superimposed on stage 5 chronic kidney disease, not on chronic dialysis        HPI:  57 year old male with history of HTN, HLD, Morbid Obesity presented to ED complaining of pain, swelling and redness of LLE X 3 days, progressively worsening in swelling and redness. Is 4-8/10 painful (aching), waxing and waning of own accord, but does worsen with usage of limb, with radiations towards groin. Has been taking "A lot" of ibuprofen for the pain. Was hypotensive (90's) coming through the door, which has responded to volume.    In ED: labs reviewed and noted below: leukocytosis with minimal normocytic anemia; hyponatremia, hypokalemia (treated in ED) with stage 4/5 renal dysfunction (no prior history); lactate is normal. UA: mild protein and hematuria; nitrite neg. Cultured in ED. Tib/Fib films reviewed: no bony path. US leg: no DVT. Telemetry reviewed: sinus    Discussed with ED MD: Inpatient admission; culture as above; volume; hold nephrotoxins (ARB, Tenoretic, NSAID's), prn analgesia; hold hypertensives currently using hydralazine per prn orders instead; broad spectrum antibiotics currently with narrowing pending culture results; Nephrology consultation; A1c, TSH with AM labs for review      Overview/Hospital Course:  Seen and examined   Patient reported diarrheal episodes recently and also was taking ibuprofen for his left leg swelling.   Ultrasound legs negative for DVT. BRENDAN worsen .     10/16  Admitted with severe BRENDAN secondary to gastroenteritis and also with left leg severe cellulitis  Today   Patient seen and examined.  His left leg acutely worsened with more poorly and tense and " spreading cellulitis above knee  CT ordered and no abscess and no gas seen .  D/w Dr Rivas and consulted .   Creatinine back to 1.9      Interval History:     Review of Systems  As per subjective   Objective:     Vital Signs (Most Recent):  Temp: 98.2 °F (36.8 °C) (10/16/22 1143)  Pulse: 77 (10/16/22 1143)  Resp: 18 (10/16/22 1143)  BP: (!) 142/81 (10/16/22 1143)  SpO2: 97 % (10/16/22 1143)   Vital Signs (24h Range):  Temp:  [97.8 °F (36.6 °C)-99.1 °F (37.3 °C)] 98.2 °F (36.8 °C)  Pulse:  [72-87] 77  Resp:  [16-18] 18  SpO2:  [95 %-97 %] 97 %  BP: (117-142)/(68-81) 142/81     Weight: (!) 159.2 kg (350 lb 15.6 oz)  Body mass index is 48.95 kg/m².    Intake/Output Summary (Last 24 hours) at 10/16/2022 1609  Last data filed at 10/16/2022 0830  Gross per 24 hour   Intake 2472.92 ml   Output 4350 ml   Net -1877.08 ml      Physical Exam  Constitutional:       General: He is not in acute distress.     Appearance: He is well-developed.   HENT:      Head: Normocephalic.   Eyes:      Pupils: Pupils are equal, round, and reactive to light.   Cardiovascular:      Rate and Rhythm: Normal rate and regular rhythm.   Pulmonary:      Effort: Pulmonary effort is normal. No respiratory distress.   Abdominal:      General: Abdomen is flat. There is no distension.      Tenderness: There is no abdominal tenderness.   Musculoskeletal:      Cervical back: Neck supple.   Skin:     General: Skin is warm.      Findings: No rash.   Neurological:      Mental Status: He is alert and oriented to person, place, and time.   Psychiatric:         Mood and Affect: Mood normal.       Significant Labs: All pertinent labs within the past 24 hours have been reviewed.  CMP:   Recent Labs   Lab 10/14/22  1615 10/15/22  0525 10/16/22  0259   * 130* 136   K 2.6* 2.4* 2.6*   CL 91* 93* 98   CO2 25 25 27   * 177* 153*   BUN 55* 70* 54*   CREATININE 4.7* 5.4* 1.9*   CALCIUM 8.4* 8.3* 8.6*   PROT 7.7  --  6.7   ALBUMIN 3.1*  --  2.6*   BILITOT  1.4*  --  1.1*   ALKPHOS 124  --  196*   AST 86*  --  97*   ALT 43  --  62*   ANIONGAP 13 12 11     Cardiac Markers:   Recent Labs   Lab 10/16/22  0259   *     Coagulation: No results for input(s): PT, INR, APTT in the last 48 hours.    Significant Imaging: I have reviewed all pertinent imaging results/findings within the past 24 hours.      Assessment/Plan:      Active Hospital Problems    Diagnosis    *Acute renal failure superimposed on stage 5 chronic kidney disease, not on chronic dialysis    Morbid obesity    Iron deficiency anemia    Hypokalemia    Cellulitis of left leg    ELLIE (obstructive sleep apnea) cannot tolerate cpap    S/P coronary artery stent placement - LAD 08/09/2019    Hyperlipidemia, mixed    Essential hypertension, benign    Alcohol abuse, daily use          PLAN   vanco renal dose  cefepime  IVF lower dose   Elevated the legs . CT ordered and reviewed , no gas forming   ID and surgery consultation   Follow culture  IV KCL replacement and PO potassium most likely diuretics induced   Rheumatology serology given proteinuria and microhematuria   US renal negative for obstruction .   Need colonoscopy as OP for NASIR        VTE Risk Mitigation (From admission, onward)         Ordered     enoxaparin injection 40 mg  Daily         10/15/22 0116     IP VTE HIGH RISK PATIENT  Once         10/15/22 0109     Place sequential compression device  Until discontinued         10/15/22 0109                Discharge Planning   QUINTIN:      Code Status: Full Code   Is the patient medically ready for discharge?:     Reason for patient still in hospital (select all that apply): Treatment  Discharge Plan A: Home                  Estuardo Avila MD  Department of Hospital Medicine   [facility]

## 2022-10-16 NOTE — PLAN OF CARE
Problem: Adult Inpatient Plan of Care  Goal: Plan of Care Review  Outcome: Ongoing, Progressing  Goal: Absence of Hospital-Acquired Illness or Injury  Outcome: Ongoing, Progressing  Goal: Optimal Comfort and Wellbeing  Outcome: Ongoing, Progressing  Goal: Readiness for Transition of Care  Outcome: Ongoing, Progressing     Problem: Bariatric Environmental Safety  Goal: Safety Maintained with Care  Outcome: Ongoing, Progressing     Problem: Fluid and Electrolyte Imbalance (Acute Kidney Injury/Impairment)  Goal: Fluid and Electrolyte Balance  Outcome: Ongoing, Progressing     Problem: Impaired Wound Healing  Goal: Optimal Wound Healing  Outcome: Ongoing, Progressing     Problem: Skin Injury Risk Increased  Goal: Skin Health and Integrity  Outcome: Ongoing, Progressing

## 2022-10-16 NOTE — SUBJECTIVE & OBJECTIVE
Interval History:     Review of Systems  As per subjective   Objective:     Vital Signs (Most Recent):  Temp: 98.2 °F (36.8 °C) (10/16/22 1143)  Pulse: 77 (10/16/22 1143)  Resp: 18 (10/16/22 1143)  BP: (!) 142/81 (10/16/22 1143)  SpO2: 97 % (10/16/22 1143)   Vital Signs (24h Range):  Temp:  [97.8 °F (36.6 °C)-99.1 °F (37.3 °C)] 98.2 °F (36.8 °C)  Pulse:  [72-87] 77  Resp:  [16-18] 18  SpO2:  [95 %-97 %] 97 %  BP: (117-142)/(68-81) 142/81     Weight: (!) 159.2 kg (350 lb 15.6 oz)  Body mass index is 48.95 kg/m².    Intake/Output Summary (Last 24 hours) at 10/16/2022 1609  Last data filed at 10/16/2022 0830  Gross per 24 hour   Intake 2472.92 ml   Output 4350 ml   Net -1877.08 ml      Physical Exam  Constitutional:       General: He is not in acute distress.     Appearance: He is well-developed.   HENT:      Head: Normocephalic.   Eyes:      Pupils: Pupils are equal, round, and reactive to light.   Cardiovascular:      Rate and Rhythm: Normal rate and regular rhythm.   Pulmonary:      Effort: Pulmonary effort is normal. No respiratory distress.   Abdominal:      General: Abdomen is flat. There is no distension.      Tenderness: There is no abdominal tenderness.   Musculoskeletal:      Cervical back: Neck supple.   Skin:     General: Skin is warm.      Findings: No rash.   Neurological:      Mental Status: He is alert and oriented to person, place, and time.   Psychiatric:         Mood and Affect: Mood normal.       Significant Labs: All pertinent labs within the past 24 hours have been reviewed.  CMP:   Recent Labs   Lab 10/14/22  1615 10/15/22  0525 10/16/22  0259   * 130* 136   K 2.6* 2.4* 2.6*   CL 91* 93* 98   CO2 25 25 27   * 177* 153*   BUN 55* 70* 54*   CREATININE 4.7* 5.4* 1.9*   CALCIUM 8.4* 8.3* 8.6*   PROT 7.7  --  6.7   ALBUMIN 3.1*  --  2.6*   BILITOT 1.4*  --  1.1*   ALKPHOS 124  --  196*   AST 86*  --  97*   ALT 43  --  62*   ANIONGAP 13 12 11     Cardiac Markers:   Recent Labs   Lab  10/16/22  0259   *     Coagulation: No results for input(s): PT, INR, APTT in the last 48 hours.    Significant Imaging: I have reviewed all pertinent imaging results/findings within the past 24 hours.

## 2022-10-16 NOTE — PROGRESS NOTES
Pharmacokinetic Assessment Follow Up: IV Vancomycin    Vancomycin serum concentration assessment(s):    The random level was drawn correctly and can be used to guide therapy at this time. The measurement is below the desired definitive target range of 10 to 15 mcg/mL.    Vancomycin Regimen Plan:    Give 2000 mg once and Re-dose when the random level is less than 15 mcg/mL, next level to be drawn at 0500 on 10/17    Drug levels (last 3 results):  Recent Labs   Lab Result Units 10/16/22  0259   Vancomycin, Random ug/mL 7.4       Pharmacy will continue to follow and monitor vancomycin.    Please contact pharmacy at extension [phone] for questions regarding this assessment.    Thank you for the consult,   eRll Christopher       Patient brief summary:  Be Shipman is a 57 y.o. male initiated on antimicrobial therapy with IV Vancomycin for treatment of skin & soft tissue infection    Drug Allergies:   Review of patient's allergies indicates:   Allergen Reactions    Tetanus vaccines and toxoid Anaphylaxis, Hives and Rash       Actual Body Weight:   159.2 kg    Renal Function:   Estimated Creatinine Clearance: 23.5 mL/min (A) (based on SCr of 5.4 mg/dL (H)).,     CBC (last 72 hours):  Recent Labs   Lab Result Units 10/14/22  1615 10/15/22  0525 10/16/22  0259   WBC K/uL 18.73* 19.19* 17.32*  17.32*   Hemoglobin g/dL 11.8* 10.4* 10.8*  10.8*   Hemoglobin A1C %  --  6.3*  --    Hematocrit % 35.2* 31.3* 32.8*  32.8*   Platelets K/uL 190 180 234  234   Gran % % 77.9* 83.0*  --    Lymph % % 10.8* 8.0*  --    Mono % % 8.5 3.0*  --    Eosinophil % % 0.2 0.0  --    Basophil % % 0.3 0.0  --    Differential Method  Automated Manual  --        Metabolic Panel (last 72 hours):  Recent Labs   Lab Result Units 10/14/22  1615 10/14/22  1649 10/15/22  0525 10/15/22  2204   Sodium mmol/L 129*  --  130*  --    Sodium, Urine mmol/L  --   --   --  <10*   Potassium mmol/L 2.6*  --  2.4*  --    Chloride mmol/L 91*  --  93*  --    CO2 mmol/L 25   --  25  --    Glucose mg/dL 156*  --  177*  --    Glucose, UA   --  Negative  --   --    BUN mg/dL 55*  --  70*  --    Creatinine mg/dL 4.7*  --  5.4*  --    Albumin g/dL 3.1*  --   --   --    Total Bilirubin mg/dL 1.4*  --   --   --    Alkaline Phosphatase U/L 124  --   --   --    AST U/L 86*  --   --   --    ALT U/L 43  --   --   --        Vancomycin Administrations:  vancomycin given in the last 96 hours                     vancomycin (VANCOCIN) 2,000 mg in dextrose 5 % 500 mL IVPB (mg) 2,000 mg New Bag 10/15/22 0615                    Microbiologic Results:  Microbiology Results (last 7 days)       Procedure Component Value Units Date/Time    Blood culture #1 **CANNOT BE ORDERED STAT** [[id number]] Collected: 10/14/22 1615    Order Status: Completed Specimen: Blood from Peripheral, Forearm, Left Updated: 10/15/22 1832     Blood Culture, Routine No Growth to date      No Growth to date    Blood culture #2 **CANNOT BE ORDERED STAT** [[id number]] Collected: 10/14/22 1643    Order Status: Completed Specimen: Blood from Peripheral, Antecubital, Right Updated: 10/15/22 1832     Blood Culture, Routine No Growth to date      No Growth to date    Urine culture [[id number]] Collected: 10/14/22 1649    Order Status: Completed Specimen: Urine Updated: 10/15/22 0700     Urine Culture, Routine No growth to date    Narrative:      Specimen Source->Urine

## 2022-10-16 NOTE — PROGRESS NOTES
" INPATIENT NEPHROLOGY PROGRESS  [facility] NEPHROLOGY    Be Shipman  10/16/2022    Reason for consultation:    Acute kidney injury    Chief Complaint:   Chief Complaint   Patient presents with    Leg Swelling     LLE swelling and weeping, redness x2 days ago.          History of Present Illness:    Per H and P    57 year old male with history of HTN, HLD, Morbid Obesity presented to ED complaining of pain, swelling and redness of LLE X 3 days, progressively worsening in swelling and redness. Is 4-8/10 painful (aching), waxing and waning of own accord, but does worsen with usage of limb, with radiations towards groin. Has been taking "A lot" of ibuprofen for the pain. Was hypotensive (90's) coming through the door, which has responded to volume.    10/15  consulted for BRENDAN.  Pt had leg pain.  He stated that he had food poisoning starting on Monday and had severe nausea and vomiting and diarrhea all week.  He still has diarrhea.  No nausea.  Appetite is intact.  No confusion.  He denies NSAID use.  He was only taking tylenol for pain  10/16  VSS, renal function improving.  Hypokalemic, got IV repletion.  Feels better, no n/v/d.    Plan of Care:       Assessment:    Acute kidney injury likely due to hemodynamic renal compromise from volume depletion and hypotension  --volume resuscitation  --renal us/bladder us  --rheumatologic serologies given proteinuria and microhematuria--in process  --old benicar and lasix  --hold metformin  --Avoid NSAIDS, Patel II inhibitors, and other non-essential nephrotoxic agents  --strict I/Os  --at risk for contrast nephropathy  --note:  pt had normal cr in July.  He doesn't appear to have "stage 5 chronic kidney disease"     Hypokalemia  --replete   --check magnesium-1.9    Anemia  --iron panel  --paraproteins    Cellulitis  --renal dose abx for crcl 10-50    Hyponatremia  --urine osm  --no SSRI antidepressants or thiazide diuretics  --avoid hypotonic iv piggybacks  --uric acid  --TSH " is normal  --bnp        I have discussed the risks and benefits of hemodialysis with the patient.  All of their questions were answered.  Risks include low blood pressure, stroke, heart attack, seizure, infection, nausea, delirium, and death.  He has no acute need today.                   Thank you for allowing us to participate in this patient's care. We will continue to follow.    Vital Signs:  Temp Readings from Last 3 Encounters:   10/16/22 98.1 °F (36.7 °C) (Oral)   05/27/22 97.7 °F (36.5 °C)   11/05/21 97.9 °F (36.6 °C)       Pulse Readings from Last 3 Encounters:   10/16/22 72   05/27/22 77   11/05/21 99       BP Readings from Last 3 Encounters:   10/16/22 117/72   05/27/22 136/84   11/05/21 (!) 136/92       Weight:  Wt Readings from Last 3 Encounters:   10/15/22 (!) 159.2 kg (350 lb 15.6 oz)   05/27/22 (!) 158.3 kg (349 lb)   11/05/21 (!) 156.5 kg (345 lb)       Past Medical & Surgical History:  Past Medical History:   Diagnosis Date    Anticoagulant long-term use     Coronary artery disease     Diabetes mellitus     Hyperlipidemia     Hypertension     Morbid obesity        Past Surgical History:   Procedure Laterality Date    CORONARY STENT PLACEMENT  08/09/2019    pt has card in [facility] Dr. Grimm    HAND SURGERY Left     22 y/o, laceration repair    HERNIA REPAIR      umbilical hernia    INCISION AND DRAINAGE FOOT Right 09/13/2021    Procedure: INCISION AND DRAINAGE, FOOT;  Surgeon: Juan Jose Julio DPM;  Location: [facility] OR;  Service: Podiatry;  Laterality: Right;    left breast tumor removed (benign)       LEFT HEART CATHETERIZATION Left 08/09/2019    Procedure: Left heart cath;  Surgeon: Erik Jessica MD;  Location: [facility] CATH;  Service: Cardiology;  Laterality: Left;       Past Social History:  Social History     Socioeconomic History    Marital status:    Tobacco Use    Smoking status: Former    Smokeless tobacco: Former     Quit date: 7/22/2000    Tobacco comments:     quit 12  years ago   Substance and Sexual Activity    Alcohol use: Yes     Comment: every day 1/2 a fifth of whiskey per day    Drug use: No    Sexual activity: Yes     Social Determinants of Health     Financial Resource Strain: Low Risk     Difficulty of Paying Living Expenses: Not very hard   Food Insecurity: No Food Insecurity    Worried About Running Out of Food in the Last Year: Never true    Ran Out of Food in the Last Year: Never true   Transportation Needs: No Transportation Needs    Lack of Transportation (Medical): No    Lack of Transportation (Non-Medical): No   Physical Activity: Sufficiently Active    Days of Exercise per Week: 5 days    Minutes of Exercise per Session: 30 min   Stress: No Stress Concern Present    Feeling of Stress : Not at all   Social Connections: Moderately Isolated    Frequency of Communication with Friends and Family: More than three times a week    Frequency of Social Gatherings with Friends and Family: More than three times a week    Attends [employer] Services: Never    Active Member of Clubs or Organizations: No    Attends Club or Organization Meetings: Never    Marital Status:    Housing Stability: Low Risk     Unable to Pay for Housing in the Last Year: No    Number of Places Lived in the Last Year: 1    Unstable Housing in the Last Year: No       Medications:  No current facility-administered medications on file prior to encounter.     Current Outpatient Medications on File Prior to Encounter   Medication Sig Dispense Refill    allopurinoL (ZYLOPRIM) 300 MG tablet TAKE 1 TABLET BY MOUTH EVERY DAY (Patient taking differently: Take 300 mg by mouth once daily.) 90 tablet 0    amLODIPine (NORVASC) 10 MG tablet Take 1 tablet (10 mg total) by mouth once daily. 90 tablet 3    atenoloL (TENORMIN) 50 MG tablet Take 50 mg by mouth every evening.      atenoloL-chlorthalidone (TENORETIC) 100-25 mg per tablet Take 1 tablet by mouth once daily.      atorvastatin (LIPITOR) 40 MG tablet TAKE  1 TABLET BY MOUTH EVERY DAY (Patient taking differently: Take 40 mg by mouth once daily.) 90 tablet 0    cloNIDine (CATAPRES) 0.1 MG tablet Take 1 tablet (0.1 mg total) by mouth 2 (two) times daily. 180 tablet 0    clopidogreL (PLAVIX) 75 mg tablet TAKE 1 TABLET BY MOUTH EVERY DAY (Patient taking differently: Take 75 mg by mouth once daily.) 90 tablet 3    esomeprazole (NEXIUM) 40 MG capsule Take 40 mg by mouth before breakfast.      furosemide (LASIX) 20 MG tablet Take 1 tablet (20 mg total) by mouth once daily. 90 tablet 0    olmesartan (BENICAR) 40 MG tablet Take 1 tablet (40 mg total) by mouth once daily. 90 tablet 3    potassium chloride SA (K-DUR,KLOR-CON) 20 MEQ tablet TAKE 1 TABLET BY MOUTH EVERY DAY (Patient taking differently: Take 20 mEq by mouth once daily.) 90 tablet 0    metFORMIN (GLUCOPHAGE-XR) 500 MG ER 24hr tablet TAKE 1 TABLET BY MOUTH EVERY DAY WITH BREAKFAST (Patient taking differently: Take 500 mg by mouth daily with breakfast.) 90 tablet 0    nitroGLYCERIN (NITROSTAT) 0.4 MG SL tablet Place 1 tablet (0.4 mg total) under the tongue every 5 (five) minutes as needed for Chest pain. 25 tablet 2     Scheduled Meds:   atorvastatin  40 mg Oral Daily    ceFEPime (MAXIPIME) IVPB  1 g Intravenous Q12H    clopidogreL  75 mg Oral Daily    enoxaparin  40 mg Subcutaneous Daily    pantoprazole  40 mg Oral Before breakfast    potassium chloride  40 mEq Oral BID     Continuous Infusions:   0/9% NACL & POTASSIUM CHLORIDE 20 MEQ/L Stopped (10/16/22 0527)     PRN Meds:.acetaminophen, hydrALAZINE, HYDROcodone-acetaminophen, magnesium oxide, magnesium oxide, melatonin, morphine, nitroGLYCERIN, ondansetron, potassium bicarbonate, potassium bicarbonate, potassium bicarbonate, potassium, sodium phosphates, potassium, sodium phosphates, potassium, sodium phosphates, Pharmacy to dose Vancomycin consult **AND** vancomycin - pharmacy to dose    Allergies:  Tetanus vaccines and toxoid    Past Family History:  Reviewed;  "refer to Hospitalist Admission Note    Review of Systems:  Review of Systems - All 14 systems reviewed and negative, except as noted in HPI    Physical Exam:    /72   Pulse 72   Temp 98.1 °F (36.7 °C) (Oral)   Resp 17   Ht 5' 11" (1.803 m)   Wt (!) 159.2 kg (350 lb 15.6 oz)   SpO2 96%   BMI 48.95 kg/m²     General Appearance:    Alert, cooperative, no distress, appears stated age   Head:    Normocephalic, without obvious abnormality, atraumatic   Eyes:    PER, conjunctiva/corneas clear, EOM's intact in both eyes        Throat:   Lips, mucosa, and tongue normal; teeth and gums normal   Back:     Symmetric, no curvature, ROM normal, no CVA tenderness   Lungs:     Clear to auscultation bilaterally, respirations unlabored   Chest wall:    No tenderness or deformity   Heart:    Regular rate and rhythm, S1 and S2 normal, no murmur, rub   or gallop   Abdomen:     Soft, non-tender, bowel sounds active all four quadrants,     no masses, no organomegaly   Extremities:   Left leg swollen and red   Pulses:   2+ and symmetric all extremities   MSK:   No joint or muscle swelling, tenderness or deformity   Skin:   Skin color, texture, turgor normal, no rashes or lesions   Neurologic:   CNII-XII intact, normal strength and sensation       Throughout.  No flap     Results:  Lab Results   Component Value Date     10/16/2022    K 2.6 (LL) 10/16/2022    CL 98 10/16/2022    CO2 27 10/16/2022    BUN 54 (H) 10/16/2022    CREATININE 1.9 (H) 10/16/2022    CALCIUM 8.6 (L) 10/16/2022    ANIONGAP 11 10/16/2022    ESTGFRAFRICA >60.0 07/07/2022    EGFRNONAA >60.0 07/07/2022       Lab Results   Component Value Date    CALCIUM 8.6 (L) 10/16/2022    PHOS 2.9 09/11/2021       Recent Labs   Lab 10/16/22  0259   WBC 17.32*  17.32*   RBC 3.62*  3.62*   HGB 10.8*  10.8*   HCT 32.8*  32.8*     234   MCV 91  91   MCH 29.8  29.8   MCHC 32.9  32.9            I have personally reviewed pertinent radiological imaging and " reports.    Patient care was time spent personally by me on the following activities:   Obtaining a history  Examination of patient.  Providing medical care at the patients bedside.  Developing a treatment plan with patient or surrogate and bedside caregivers  Ordering and reviewing laboratory studies, radiographic studies, pulse oximetry.  Ordering and performing treatments and interventions.  Evaluation of patient's response to treatment.  Discussions with consultants while on the unit and immediately available to the patient.  Re-evaluation of the patient's condition.  Documentation in the medical record.     Raj Wilson MD  Nephrology  [facility] [place]  [phone]

## 2022-10-17 ENCOUNTER — ANESTHESIA EVENT (OUTPATIENT)
Dept: SURGERY | Facility: HOSPITAL | Age: 57
DRG: 683 | End: 2022-10-17
Payer: COMMERCIAL

## 2022-10-17 ENCOUNTER — ANESTHESIA (OUTPATIENT)
Dept: SURGERY | Facility: HOSPITAL | Age: 57
DRG: 683 | End: 2022-10-17
Payer: COMMERCIAL

## 2022-10-17 PROBLEM — E11.9 TYPE 2 DIABETES MELLITUS, WITHOUT LONG-TERM CURRENT USE OF INSULIN: Chronic | Status: ACTIVE | Noted: 2022-10-17

## 2022-10-17 PROBLEM — E66.01 SEVERE OBESITY (BMI >= 40): Chronic | Status: ACTIVE | Noted: 2022-10-17

## 2022-10-17 PROBLEM — D72.829 LEUCOCYTOSIS: Status: ACTIVE | Noted: 2022-10-17

## 2022-10-17 PROBLEM — E87.1 HYPONATREMIA: Status: ACTIVE | Noted: 2022-10-17

## 2022-10-17 PROBLEM — A41.9 SEPSIS: Status: RESOLVED | Noted: 2022-10-16 | Resolved: 2022-10-17

## 2022-10-17 PROBLEM — N17.9 AKI (ACUTE KIDNEY INJURY): Status: ACTIVE | Noted: 2022-10-17

## 2022-10-17 PROBLEM — N17.9 AKI (ACUTE KIDNEY INJURY): Status: RESOLVED | Noted: 2022-10-17 | Resolved: 2022-10-17

## 2022-10-17 PROBLEM — E87.1 HYPONATREMIA: Status: RESOLVED | Noted: 2022-10-17 | Resolved: 2022-10-17

## 2022-10-17 LAB
ALBUMIN SERPL BCP-MCNC: 2.7 G/DL (ref 3.5–5.2)
ALP SERPL-CCNC: 213 U/L (ref 55–135)
ALT SERPL W/O P-5'-P-CCNC: 68 U/L (ref 10–44)
ANA TITR SER IF: NEGATIVE {TITER}
ANION GAP SERPL CALC-SCNC: 14 MMOL/L (ref 8–16)
AST SERPL-CCNC: 75 U/L (ref 10–40)
BASOPHILS # BLD AUTO: ABNORMAL K/UL (ref 0–0.2)
BASOPHILS NFR BLD: 1 % (ref 0–1.9)
BILIRUB SERPL-MCNC: 0.8 MG/DL (ref 0.1–1)
BUN SERPL-MCNC: 20 MG/DL (ref 6–20)
CALCIUM SERPL-MCNC: 9.5 MG/DL (ref 8.7–10.5)
CHLORIDE SERPL-SCNC: 102 MMOL/L (ref 95–110)
CO2 SERPL-SCNC: 25 MMOL/L (ref 23–29)
CREAT SERPL-MCNC: 0.8 MG/DL (ref 0.5–1.4)
CRP SERPL-MCNC: 22.13 MG/DL
DIFFERENTIAL METHOD: ABNORMAL
EOSINOPHIL # BLD AUTO: ABNORMAL K/UL (ref 0–0.5)
EOSINOPHIL NFR BLD: 2 % (ref 0–8)
ERYTHROCYTE [DISTWIDTH] IN BLOOD BY AUTOMATED COUNT: 14.7 % (ref 11.5–14.5)
EST. GFR  (NO RACE VARIABLE): >60 ML/MIN/1.73 M^2
GLUCOSE SERPL-MCNC: 117 MG/DL (ref 70–110)
GLUCOSE SERPL-MCNC: 142 MG/DL (ref 70–110)
HCT VFR BLD AUTO: 36.2 % (ref 40–54)
HGB BLD-MCNC: 11.7 G/DL (ref 14–18)
IMM GRANULOCYTES # BLD AUTO: ABNORMAL K/UL (ref 0–0.04)
IMM GRANULOCYTES NFR BLD AUTO: ABNORMAL % (ref 0–0.5)
LYMPHOCYTES # BLD AUTO: ABNORMAL K/UL (ref 1–4.8)
LYMPHOCYTES NFR BLD: 11 % (ref 18–48)
MAGNESIUM SERPL-MCNC: 2 MG/DL (ref 1.6–2.6)
MCH RBC QN AUTO: 29.1 PG (ref 27–31)
MCHC RBC AUTO-ENTMCNC: 32.3 G/DL (ref 32–36)
MCV RBC AUTO: 90 FL (ref 82–98)
MONOCYTES # BLD AUTO: ABNORMAL K/UL (ref 0.3–1)
MONOCYTES NFR BLD: 6 % (ref 4–15)
MYELOCYTES NFR BLD MANUAL: 2 %
NEUTROPHILS NFR BLD: 75 % (ref 38–73)
NEUTS BAND NFR BLD MANUAL: 3 %
NRBC BLD-RTO: 0 /100 WBC
PLATELET # BLD AUTO: 325 K/UL (ref 150–450)
PLATELET BLD QL SMEAR: ABNORMAL
PMV BLD AUTO: 10.2 FL (ref 9.2–12.9)
POTASSIUM SERPL-SCNC: 3.2 MMOL/L (ref 3.5–5.1)
PROT SERPL-MCNC: 7.3 G/DL (ref 6–8.4)
RBC # BLD AUTO: 4.02 M/UL (ref 4.6–6.2)
SODIUM SERPL-SCNC: 141 MMOL/L (ref 136–145)
VANCOMYCIN SERPL-MCNC: 3.8 UG/ML
WBC # BLD AUTO: 19.97 K/UL (ref 3.9–12.7)

## 2022-10-17 PROCEDURE — 10061 PR DRAIN SKIN ABSCESS COMPLIC: ICD-10-PCS | Mod: ,,, | Performed by: SURGERY

## 2022-10-17 PROCEDURE — 27202103: Performed by: ANESTHESIOLOGY

## 2022-10-17 PROCEDURE — 63600175 PHARM REV CODE 636 W HCPCS: Performed by: INTERNAL MEDICINE

## 2022-10-17 PROCEDURE — 87206 SMEAR FLUORESCENT/ACID STAI: CPT | Performed by: SURGERY

## 2022-10-17 PROCEDURE — 36000704 HC OR TIME LEV I 1ST 15 MIN: Performed by: SURGERY

## 2022-10-17 PROCEDURE — 99900035 HC TECH TIME PER 15 MIN (STAT)

## 2022-10-17 PROCEDURE — 37000009 HC ANESTHESIA EA ADD 15 MINS: Performed by: SURGERY

## 2022-10-17 PROCEDURE — 99222 1ST HOSP IP/OBS MODERATE 55: CPT | Mod: 25,,, | Performed by: SURGERY

## 2022-10-17 PROCEDURE — 87075 CULTR BACTERIA EXCEPT BLOOD: CPT | Performed by: SURGERY

## 2022-10-17 PROCEDURE — 25000003 PHARM REV CODE 250: Performed by: NURSE ANESTHETIST, CERTIFIED REGISTERED

## 2022-10-17 PROCEDURE — 25000003 PHARM REV CODE 250: Performed by: INTERNAL MEDICINE

## 2022-10-17 PROCEDURE — 87118 MYCOBACTERIC IDENTIFICATION: CPT | Performed by: SURGERY

## 2022-10-17 PROCEDURE — 99232 PR SUBSEQUENT HOSPITAL CARE,LEVL II: ICD-10-PCS | Mod: ,,, | Performed by: INTERNAL MEDICINE

## 2022-10-17 PROCEDURE — 25000003 PHARM REV CODE 250: Performed by: SURGERY

## 2022-10-17 PROCEDURE — 85027 COMPLETE CBC AUTOMATED: CPT | Performed by: INTERNAL MEDICINE

## 2022-10-17 PROCEDURE — 82962 GLUCOSE BLOOD TEST: CPT | Performed by: SURGERY

## 2022-10-17 PROCEDURE — 86140 C-REACTIVE PROTEIN: CPT | Performed by: INTERNAL MEDICINE

## 2022-10-17 PROCEDURE — 10061 I&D ABSCESS COMP/MULTIPLE: CPT | Mod: ,,, | Performed by: SURGERY

## 2022-10-17 PROCEDURE — 87102 FUNGUS ISOLATION CULTURE: CPT | Performed by: SURGERY

## 2022-10-17 PROCEDURE — 99900031 HC PATIENT EDUCATION (STAT)

## 2022-10-17 PROCEDURE — 36415 COLL VENOUS BLD VENIPUNCTURE: CPT | Performed by: INTERNAL MEDICINE

## 2022-10-17 PROCEDURE — 63600175 PHARM REV CODE 636 W HCPCS: Performed by: NURSE ANESTHETIST, CERTIFIED REGISTERED

## 2022-10-17 PROCEDURE — 37000008 HC ANESTHESIA 1ST 15 MINUTES: Performed by: SURGERY

## 2022-10-17 PROCEDURE — 27000654 HC CATH IV JELCO: Performed by: ANESTHESIOLOGY

## 2022-10-17 PROCEDURE — 83735 ASSAY OF MAGNESIUM: CPT | Performed by: INTERNAL MEDICINE

## 2022-10-17 PROCEDURE — 87205 SMEAR GRAM STAIN: CPT | Performed by: SURGERY

## 2022-10-17 PROCEDURE — 80202 ASSAY OF VANCOMYCIN: CPT | Performed by: INTERNAL MEDICINE

## 2022-10-17 PROCEDURE — 63600175 PHARM REV CODE 636 W HCPCS: Performed by: SURGERY

## 2022-10-17 PROCEDURE — 87116 MYCOBACTERIA CULTURE: CPT | Performed by: SURGERY

## 2022-10-17 PROCEDURE — 94761 N-INVAS EAR/PLS OXIMETRY MLT: CPT

## 2022-10-17 PROCEDURE — 27000673 HC TUBING BLOOD Y: Performed by: ANESTHESIOLOGY

## 2022-10-17 PROCEDURE — 12000002 HC ACUTE/MED SURGE SEMI-PRIVATE ROOM

## 2022-10-17 PROCEDURE — 36000705 HC OR TIME LEV I EA ADD 15 MIN: Performed by: SURGERY

## 2022-10-17 PROCEDURE — 71000033 HC RECOVERY, INTIAL HOUR: Performed by: SURGERY

## 2022-10-17 PROCEDURE — 27000671 HC TUBING MICROBORE EXT: Performed by: ANESTHESIOLOGY

## 2022-10-17 PROCEDURE — 85007 BL SMEAR W/DIFF WBC COUNT: CPT | Performed by: INTERNAL MEDICINE

## 2022-10-17 PROCEDURE — 99222 PR INITIAL HOSPITAL CARE,LEVL II: ICD-10-PCS | Mod: 25,,, | Performed by: SURGERY

## 2022-10-17 PROCEDURE — 99232 SBSQ HOSP IP/OBS MODERATE 35: CPT | Mod: ,,, | Performed by: INTERNAL MEDICINE

## 2022-10-17 PROCEDURE — 80053 COMPREHEN METABOLIC PANEL: CPT | Performed by: INTERNAL MEDICINE

## 2022-10-17 PROCEDURE — 27000284 HC CANNULA NASAL: Performed by: ANESTHESIOLOGY

## 2022-10-17 PROCEDURE — 87070 CULTURE OTHR SPECIMN AEROBIC: CPT | Performed by: SURGERY

## 2022-10-17 RX ORDER — ATENOLOL 50 MG/1
50 TABLET ORAL DAILY
Status: DISCONTINUED | OUTPATIENT
Start: 2022-10-17 | End: 2022-10-18

## 2022-10-17 RX ORDER — DOXYLAMINE SUCCINATE 25 MG
TABLET ORAL 2 TIMES DAILY
Status: DISCONTINUED | OUTPATIENT
Start: 2022-10-17 | End: 2022-10-20 | Stop reason: HOSPADM

## 2022-10-17 RX ORDER — SODIUM CHLORIDE, SODIUM LACTATE, POTASSIUM CHLORIDE, CALCIUM CHLORIDE 600; 310; 30; 20 MG/100ML; MG/100ML; MG/100ML; MG/100ML
INJECTION, SOLUTION INTRAVENOUS CONTINUOUS PRN
Status: DISCONTINUED | OUTPATIENT
Start: 2022-10-17 | End: 2022-10-17

## 2022-10-17 RX ORDER — CEFAZOLIN SODIUM 2 G/50ML
2 SOLUTION INTRAVENOUS
Status: DISCONTINUED | OUTPATIENT
Start: 2022-10-17 | End: 2022-10-20 | Stop reason: HOSPADM

## 2022-10-17 RX ORDER — OXYCODONE HYDROCHLORIDE 5 MG/1
5 TABLET ORAL
Status: DISCONTINUED | OUTPATIENT
Start: 2022-10-17 | End: 2022-10-17 | Stop reason: HOSPADM

## 2022-10-17 RX ORDER — ONDANSETRON 2 MG/ML
4 INJECTION INTRAMUSCULAR; INTRAVENOUS DAILY PRN
Status: DISCONTINUED | OUTPATIENT
Start: 2022-10-17 | End: 2022-10-17 | Stop reason: HOSPADM

## 2022-10-17 RX ORDER — ONDANSETRON 2 MG/ML
INJECTION INTRAMUSCULAR; INTRAVENOUS
Status: DISCONTINUED | OUTPATIENT
Start: 2022-10-17 | End: 2022-10-17

## 2022-10-17 RX ORDER — PROPOFOL 10 MG/ML
VIAL (ML) INTRAVENOUS
Status: DISCONTINUED | OUTPATIENT
Start: 2022-10-17 | End: 2022-10-17

## 2022-10-17 RX ORDER — ALBUTEROL SULFATE 0.83 MG/ML
2.5 SOLUTION RESPIRATORY (INHALATION)
Status: DISCONTINUED | OUTPATIENT
Start: 2022-10-17 | End: 2022-10-19

## 2022-10-17 RX ORDER — MEPERIDINE HYDROCHLORIDE 50 MG/ML
12.5 INJECTION INTRAMUSCULAR; INTRAVENOUS; SUBCUTANEOUS EVERY 10 MIN PRN
Status: DISCONTINUED | OUTPATIENT
Start: 2022-10-17 | End: 2022-10-17 | Stop reason: HOSPADM

## 2022-10-17 RX ORDER — AMOXICILLIN 250 MG
2 CAPSULE ORAL 2 TIMES DAILY PRN
Status: DISCONTINUED | OUTPATIENT
Start: 2022-10-17 | End: 2022-10-20 | Stop reason: HOSPADM

## 2022-10-17 RX ORDER — DIPHENHYDRAMINE HYDROCHLORIDE 50 MG/ML
12.5 INJECTION INTRAMUSCULAR; INTRAVENOUS
Status: DISCONTINUED | OUTPATIENT
Start: 2022-10-17 | End: 2022-10-17 | Stop reason: HOSPADM

## 2022-10-17 RX ORDER — KETAMINE HYDROCHLORIDE 50 MG/ML
INJECTION, SOLUTION INTRAMUSCULAR; INTRAVENOUS
Status: DISCONTINUED | OUTPATIENT
Start: 2022-10-17 | End: 2022-10-17

## 2022-10-17 RX ORDER — BUPIVACAINE HYDROCHLORIDE AND EPINEPHRINE 2.5; 5 MG/ML; UG/ML
INJECTION, SOLUTION EPIDURAL; INFILTRATION; INTRACAUDAL; PERINEURAL
Status: DISCONTINUED | OUTPATIENT
Start: 2022-10-17 | End: 2022-10-17 | Stop reason: HOSPADM

## 2022-10-17 RX ORDER — ATORVASTATIN CALCIUM 40 MG/1
40 TABLET, FILM COATED ORAL NIGHTLY
Status: DISCONTINUED | OUTPATIENT
Start: 2022-10-18 | End: 2022-10-20 | Stop reason: HOSPADM

## 2022-10-17 RX ORDER — POTASSIUM CHLORIDE 20 MEQ/1
20 TABLET, EXTENDED RELEASE ORAL 2 TIMES DAILY
Status: DISCONTINUED | OUTPATIENT
Start: 2022-10-18 | End: 2022-10-19

## 2022-10-17 RX ORDER — ENOXAPARIN SODIUM 100 MG/ML
40 INJECTION SUBCUTANEOUS EVERY 12 HOURS
Status: DISCONTINUED | OUTPATIENT
Start: 2022-10-17 | End: 2022-10-20 | Stop reason: HOSPADM

## 2022-10-17 RX ORDER — MIDAZOLAM HYDROCHLORIDE 1 MG/ML
INJECTION INTRAMUSCULAR; INTRAVENOUS
Status: DISCONTINUED | OUTPATIENT
Start: 2022-10-17 | End: 2022-10-17

## 2022-10-17 RX ORDER — FAMOTIDINE 10 MG/ML
INJECTION INTRAVENOUS
Status: DISCONTINUED | OUTPATIENT
Start: 2022-10-17 | End: 2022-10-17

## 2022-10-17 RX ORDER — FENTANYL CITRATE 50 UG/ML
INJECTION, SOLUTION INTRAMUSCULAR; INTRAVENOUS
Status: DISCONTINUED | OUTPATIENT
Start: 2022-10-17 | End: 2022-10-17

## 2022-10-17 RX ORDER — FENTANYL CITRATE 50 UG/ML
25 INJECTION, SOLUTION INTRAMUSCULAR; INTRAVENOUS EVERY 5 MIN PRN
Status: DISCONTINUED | OUTPATIENT
Start: 2022-10-17 | End: 2022-10-17 | Stop reason: HOSPADM

## 2022-10-17 RX ADMIN — KETAMINE HYDROCHLORIDE 5 MG: 50 INJECTION INTRAMUSCULAR; INTRAVENOUS at 09:10

## 2022-10-17 RX ADMIN — ATENOLOL 50 MG: 50 TABLET ORAL at 12:10

## 2022-10-17 RX ADMIN — KETAMINE HYDROCHLORIDE 10 MG: 50 INJECTION INTRAMUSCULAR; INTRAVENOUS at 08:10

## 2022-10-17 RX ADMIN — PROPOFOL 10 MG: 10 INJECTION, EMULSION INTRAVENOUS at 09:10

## 2022-10-17 RX ADMIN — FERROUS SULFATE TAB 325 MG (65 MG ELEMENTAL FE) 1 EACH: 325 (65 FE) TAB at 12:10

## 2022-10-17 RX ADMIN — MIDAZOLAM HYDROCHLORIDE 0.5 MG: 1 INJECTION, SOLUTION INTRAMUSCULAR; INTRAVENOUS at 08:10

## 2022-10-17 RX ADMIN — DEXTROSE 2 G: 50 INJECTION, SOLUTION INTRAVENOUS at 06:10

## 2022-10-17 RX ADMIN — KETAMINE HYDROCHLORIDE 10 MG: 50 INJECTION INTRAMUSCULAR; INTRAVENOUS at 09:10

## 2022-10-17 RX ADMIN — ONDANSETRON 4 MG: 2 INJECTION INTRAMUSCULAR; INTRAVENOUS at 08:10

## 2022-10-17 RX ADMIN — PANTOPRAZOLE SODIUM 40 MG: 40 TABLET, DELAYED RELEASE ORAL at 06:10

## 2022-10-17 RX ADMIN — PROPOFOL 30 MG: 10 INJECTION, EMULSION INTRAVENOUS at 09:10

## 2022-10-17 RX ADMIN — MIDAZOLAM HYDROCHLORIDE 1 MG: 1 INJECTION, SOLUTION INTRAMUSCULAR; INTRAVENOUS at 09:10

## 2022-10-17 RX ADMIN — FENTANYL CITRATE 50 MCG: 50 INJECTION INTRAMUSCULAR; INTRAVENOUS at 09:10

## 2022-10-17 RX ADMIN — FENTANYL CITRATE 50 MCG: 50 INJECTION INTRAMUSCULAR; INTRAVENOUS at 08:10

## 2022-10-17 RX ADMIN — FAMOTIDINE 20 MG: 10 INJECTION, SOLUTION INTRAVENOUS at 08:10

## 2022-10-17 RX ADMIN — PROPOFOL 20 MG: 10 INJECTION, EMULSION INTRAVENOUS at 08:10

## 2022-10-17 RX ADMIN — MICONAZOLE NITRATE: 20 CREAM TOPICAL at 09:10

## 2022-10-17 RX ADMIN — PROPOFOL 20 MG: 10 INJECTION, EMULSION INTRAVENOUS at 09:10

## 2022-10-17 RX ADMIN — ENOXAPARIN SODIUM 40 MG: 100 INJECTION SUBCUTANEOUS at 12:10

## 2022-10-17 RX ADMIN — VANCOMYCIN HYDROCHLORIDE 2000 MG: 500 INJECTION, POWDER, LYOPHILIZED, FOR SOLUTION INTRAVENOUS at 01:10

## 2022-10-17 RX ADMIN — ENOXAPARIN SODIUM 40 MG: 100 INJECTION SUBCUTANEOUS at 09:10

## 2022-10-17 RX ADMIN — CLOPIDOGREL BISULFATE 75 MG: 75 TABLET, FILM COATED ORAL at 12:10

## 2022-10-17 RX ADMIN — CLINDAMYCIN HYDROCHLORIDE 300 MG: 150 CAPSULE ORAL at 12:10

## 2022-10-17 RX ADMIN — CLINDAMYCIN HYDROCHLORIDE 300 MG: 150 CAPSULE ORAL at 06:10

## 2022-10-17 RX ADMIN — SODIUM CHLORIDE, SODIUM LACTATE, POTASSIUM CHLORIDE, AND CALCIUM CHLORIDE: .6; .31; .03; .02 INJECTION, SOLUTION INTRAVENOUS at 08:10

## 2022-10-17 RX ADMIN — PROPOFOL 30 MG: 10 INJECTION, EMULSION INTRAVENOUS at 08:10

## 2022-10-17 RX ADMIN — HYDROCODONE BITARTRATE AND ACETAMINOPHEN 1 TABLET: 5; 325 TABLET ORAL at 06:10

## 2022-10-17 NOTE — PROGRESS NOTES
Therapy with Vancomycib order discontinued by Dr. England on 10/17/22 @ 16:55.   Pharmacy will sign off, please re-consult as needed.    Thank you for allowing us to participate in this patient's care.  Stefani Cesar 10/17/2022 4:55 PM  Dept of Pharmacy  Ext [phone]

## 2022-10-17 NOTE — TRANSFER OF CARE
"Anesthesia Transfer of Care Note    Patient: Be Shipman    Procedure(s) Performed: Procedure(s) (LRB):  INCISION AND DRAINAGE, LOWER EXTREMITY (Left)    Patient location: PACU    Anesthesia Type: MAC    Transport from OR: Transported from OR on room air with adequate spontaneous ventilation    Post pain: adequate analgesia    Post assessment: no apparent anesthetic complications and tolerated procedure well    Post vital signs: stable    Level of consciousness: awake, alert and oriented    Nausea/Vomiting: no nausea/vomiting    Complications: none    Transfer of care protocol was followed      Last vitals:   Visit Vitals  BP (!) 147/70 (BP Location: Right arm, Patient Position: Lying)   Pulse 91   Temp 36.2 °C (97.2 °F) (Temporal)   Resp 20   Ht 5' 11" (1.803 m)   Wt (!) 159.2 kg (350 lb 15.6 oz)   SpO2 97%   BMI 48.95 kg/m²     "

## 2022-10-17 NOTE — SUBJECTIVE & OBJECTIVE
Interval History:  See hospital course.  Seen after returning from OR, no purulence as discussed with general surgery.  Currently denies any pain to the left lower extremity.  States he was not taking NSAIDs prior to admission.  No further nausea, vomiting or diarrhea.  States he has been urinating well.  T-max 99.7°.  Labs with WBC 19, hemoglobin 11.7, potassium 3.2, BUN/creatinine 20/0.8, HbA1c 6.3%.  CT reviewed.  Discussed with patient.  Discussed with general surgery.    Review of Systems   Constitutional:  Negative for fever.   Respiratory:  Negative for shortness of breath.    Gastrointestinal:  Negative for diarrhea.   Genitourinary:  Negative for difficulty urinating.   Skin:  Positive for wound.   Psychiatric/Behavioral:  Negative for confusion.    Objective:     Vital Signs (Most Recent):  Temp: 97.7 °F (36.5 °C) (10/17/22 1139)  Pulse: 85 (10/17/22 1139)  Resp: 14 (10/17/22 1139)  BP: 120/67 (10/17/22 1139)  SpO2: 97 % (10/17/22 1139) Vital Signs (24h Range):  Temp:  [97.2 °F (36.2 °C)-99.7 °F (37.6 °C)] 97.7 °F (36.5 °C)  Pulse:  [80-91] 85  Resp:  [14-20] 14  SpO2:  [93 %-98 %] 97 %  BP: (120-159)/(62-92) 120/67     Weight: (!) 159.2 kg (350 lb 15.6 oz)  Body mass index is 48.95 kg/m².    Intake/Output Summary (Last 24 hours) at 10/17/2022 1146  Last data filed at 10/17/2022 1015  Gross per 24 hour   Intake 1031.67 ml   Output 2685 ml   Net -1653.33 ml      Physical Exam  Vitals and nursing note reviewed.   Constitutional:       Appearance: He is obese.      Comments: Lying in bed, no apparent acute distress, cooperative   HENT:      Head: Normocephalic and atraumatic.      Mouth/Throat:      Mouth: Mucous membranes are moist.   Neck:      Comments: Redundant  Cardiovascular:      Rate and Rhythm: Normal rate and regular rhythm.   Pulmonary:      Effort: No respiratory distress.      Breath sounds: No wheezing.      Comments: On nasal cannula  Abdominal:      Palpations: Abdomen is soft.       Tenderness: There is no abdominal tenderness.   Skin:     Comments: Kerlix wrapping to left lower extremity with underlying erythema, serosanguineous discharge lateral aspect of dressing   Neurological:      Mental Status: He is alert and oriented to person, place, and time.   Psychiatric:         Mood and Affect: Mood normal.                 Significant Labs: BMP:   Recent Labs   Lab 10/17/22  0658   *      K 3.2*      CO2 25   BUN 20   CREATININE 0.8   CALCIUM 9.5   MG 2.0     CBC:   Recent Labs   Lab 10/16/22  0259 10/17/22  0657   WBC 17.32*  17.32* 19.97*   HGB 10.8*  10.8* 11.7*   HCT 32.8*  32.8* 36.2*     234 325     CMP:   Recent Labs   Lab 10/16/22  0259 10/17/22  0658    141   K 2.6* 3.2*   CL 98 102   CO2 27 25   * 142*   BUN 54* 20   CREATININE 1.9* 0.8   CALCIUM 8.6* 9.5   PROT 6.7 7.3   ALBUMIN 2.6* 2.7*   BILITOT 1.1* 0.8   ALKPHOS 196* 213*   AST 97* 75*   ALT 62* 68*   ANIONGAP 11 14     Cardiac Markers:   Recent Labs   Lab 10/16/22  0259   *     Magnesium:   Recent Labs   Lab 10/16/22  0259 10/17/22  0658   MG 1.9 2.0     POCT Glucose: No results for input(s): POCTGLUCOSE in the last 48 hours.    Significant Imaging: I have reviewed all pertinent imaging results/findings within the past 24 hours.    X-Ray Tibia Fibula 2 View Left    Result Date: 10/14/2022  Reason: Leg Swelling with redness and weeping x2 days FINDINGS: 2 views of left tibia and fibula show no fracture, dislocation, or destructive osseous lesion. Diffuse subcutaneous edema is suggested throughout the visualized left lower extremity. Calcaneal enthesophyte arises near plantar fascia origin. IMPRESSION: Diffuse visualized left lower extremity subcutaneous edema suggested without acute bony abnormality. Electronically signed by:  Malik Valentin MD  10/14/2022 4:57 PM CDT Workstation: [id number]    US Lower Extremity Veins Left    Result Date: 10/14/2022  US LOWER EXTREMITY VEINS  LIMITED FOLLOW-UP UNILATERAL CLINICAL HISTORY: 57 years Male swelling FINDINGS: Grayscale, color and spectral Doppler analysis of the left lower extremity deep venous system was performed. There is normal compressibility, with normal flow by color and spectral Doppler analysis in the left lower extremity deep venous system, with normal augmentation and no evidence of deep venous thrombosis. Subcutaneous edema of the left lower extremity. IMPRESSION: Negative for DVT. Electronically signed by:  Jules Snow MD  10/14/2022 6:10 PM CDT Workstation: [id number]    US Kidney    Result Date: 10/15/2022  REASON: alex TECHNIQUE: Grayscale and color Doppler ultrasound of the kidneys. COMPARISON: None. FINDINGS: Right kidney measures 11.9 x 6.4 x 6.5 cm. Left kidney measures 11.6 x 7.6 x 5.7 cm. There is no hydronephrosis or nephrolithiasis. No renal cyst or mass. The bladder is mostly decompressed. Prevoid volume 44.6 mL. Postvoid volume 25.1 mL. IMPRESSION: Unremarkable ultrasound of the kidneys and bladder. Electronically signed by:  Chase Mayo DO  10/15/2022 1:53 PM CDT Workstation: [id number]    CT Leg (Tibia-Fibula) Without Contrast Left    Result Date: 10/16/2022  CMS MANDATED QUALITY DATA - CT RADIATION  436 All CT scans at this facility utilize dose modulation, iterative reconstruction, and/or weight based dosing when appropriate to reduce radiation dose to as low as reasonably achievable. CT LOWER EXTREMITY WITHOUT IV CONTRAST CLINICAL HISTORY: 57 years Male Soft tissue mass, lower leg, US/xray nondiagnostic; Soft tissue mass, lower leg, deep; RAPID  increased in cellulitis with tense calf spreading up to thigh , possible abscess?  nec fascitis ? unable to use contrast COMPARISON: October 14, 2022 radiography FINDINGS: No acute fracture or malalignment of the tibia or fibula. No aggressive osseous lesion. Extensive, severe subcutaneous edema and skin thickening about the left lower extremity. No soft tissue  gas. No radiopaque foreign body. No well-defined abscess/drainable fluid collection on this noncontrast exam. Along the posterior medial aspect of the left lower extremity, there is a subcutaneous/dermal lesion measuring 2.3 x 0.8 cm. IMPRESSION: Severe extensive subcutaneous edema of the left lower extremity. No acute osseous abnormality. Negative for soft tissue gas. Dermal lesion along the posterior medial aspect of the calf. Please correlate with physical exam. Electronically signed by:  Jules Snow MD  10/16/2022 1:58 PM CDT Workstation: [id number]     Echocardiogram  Summary    The estimated PA systolic pressure is 27 mmHg.  The left ventricle is normal in size with concentric remodeling and normal systolic function. No WMA.  The estimated ejection fraction is 58%.  Normal left ventricular diastolic function.  Normal right ventricular size.  Mild left atrial enlargement.  Mild mitral regurgitation.  Mild tricuspid regurgitation.  Normal central venous pressure (3 mmHg).

## 2022-10-17 NOTE — PROGRESS NOTES
"[facility] Medicine  Progress Note    Patient Name: Be Shipman  MRN: [id number]  Patient Class: IP- Inpatient   Admission Date: 10/14/2022  Length of Stay: 3 days  Attending Physician: Yennifer Bojorquez MD  Primary Care Provider: Werner Patel NP        Subjective:     Principal Problem:Cellulitis of left lower extremity        HPI:  57 year old male with history of HTN, HLD, Morbid Obesity presented to ED complaining of pain, swelling and redness of LLE X 3 days, progressively worsening in swelling and redness. Is 4-8/10 painful (aching), waxing and waning of own accord, but does worsen with usage of limb, with radiations towards groin. Has been taking "A lot" of ibuprofen for the pain. Was hypotensive (90's) coming through the door, which has responded to volume.    In ED: labs reviewed and noted below: leukocytosis with minimal normocytic anemia; hyponatremia, hypokalemia (treated in ED) with stage 4/5 renal dysfunction (no prior history); lactate is normal. UA: mild protein and hematuria; nitrite neg. Cultured in ED. Tib/Fib films reviewed: no bony path. US leg: no DVT. Telemetry reviewed: sinus    Discussed with ED MD: Inpatient admission; culture as above; volume; hold nephrotoxins (ARB, Tenoretic, NSAID's), prn analgesia; hold hypertensives currently using hydralazine per prn orders instead; broad spectrum antibiotics currently with narrowing pending culture results; Nephrology consultation; A1c, TSH with AM labs for review      Overview/Hospital Course:  Seen and examined   Patient reported diarrheal episodes recently and also was taking ibuprofen for his left leg swelling.   Ultrasound legs negative for DVT. BRENDAN worsen .     10/16  Admitted with severe BRENDAN secondary to gastroenteritis and also with left leg severe cellulitis  Today   Patient seen and examined.  His left leg acutely worsened with more poorly and tense and spreading cellulitis above knee  CT ordered and no abscess " and no gas seen .  D/w Dr Rivas and consulted .  Creatinine back to 1.9    Assumed care of this patient on 10/17/22.  Patient with a history of obesity with BMI 49, ELLIE not using CPAP, diabetes mellitus, hypertension, hyperlipidemia, CAD status post PCI, he presented with 2 day history of worsening left lower extremity pain, swelling and redness, denies any preceding trauma or injury.  States he did have nausea, vomiting and diarrhea which he attributes to gastroenteritis from chicken.  He denies any NSAID use, states he was taking antihistamines at home.  On presentation noted to have leukocytosis with WBC 18, acute kidney injury creatinine 4.7, hypokalemia with potassium 2.6, hyponatremia with sodium 129.  Clinical evidence of cellulitis to left lower extremity, ultrasound no DVT, CT with extensive edema, no soft tissue gas, no foreign body, no definite abscess collection.  He was admitted started on vancomycin and cefepime with Infectious Disease consultation.  Nephrology was also consulted for acute kidney injury, ultrasound no evidence of obstruction, nephrotoxic medications were held, IV fluid hydration.  On 10/17 status post I&D OR by , no purulent see noted, serosanguineous fluid, OR cultures submitted, suspect will have bruising/bloody discharge.  He is currently on vancomycin and clindamycin.  Acute kidney injury has resolved.            Interval History:  See hospital course.  Seen after returning from OR, no purulence as discussed with general surgery.  Currently denies any pain to the left lower extremity.  States he was not taking NSAIDs prior to admission.  No further nausea, vomiting or diarrhea.  States he has been urinating well.  T-max 99.7°.  Labs with WBC 19, hemoglobin 11.7, potassium 3.2, BUN/creatinine 20/0.8, HbA1c 6.3%.  CT reviewed.  Discussed with patient.  Discussed with general surgery.    Review of Systems   Constitutional:  Negative for fever.   Respiratory:   Negative for shortness of breath.    Gastrointestinal:  Negative for diarrhea.   Genitourinary:  Negative for difficulty urinating.   Skin:  Positive for wound.   Psychiatric/Behavioral:  Negative for confusion.    Objective:     Vital Signs (Most Recent):  Temp: 97.7 °F (36.5 °C) (10/17/22 1139)  Pulse: 85 (10/17/22 1139)  Resp: 14 (10/17/22 1139)  BP: 120/67 (10/17/22 1139)  SpO2: 97 % (10/17/22 1139) Vital Signs (24h Range):  Temp:  [97.2 °F (36.2 °C)-99.7 °F (37.6 °C)] 97.7 °F (36.5 °C)  Pulse:  [80-91] 85  Resp:  [14-20] 14  SpO2:  [93 %-98 %] 97 %  BP: (120-159)/(62-92) 120/67     Weight: (!) 159.2 kg (350 lb 15.6 oz)  Body mass index is 48.95 kg/m².    Intake/Output Summary (Last 24 hours) at 10/17/2022 1146  Last data filed at 10/17/2022 1015  Gross per 24 hour   Intake 1031.67 ml   Output 2685 ml   Net -1653.33 ml      Physical Exam  Vitals and nursing note reviewed.   Constitutional:       Appearance: He is obese.      Comments: Lying in bed, no apparent acute distress, cooperative   HENT:      Head: Normocephalic and atraumatic.      Mouth/Throat:      Mouth: Mucous membranes are moist.   Neck:      Comments: Redundant  Cardiovascular:      Rate and Rhythm: Normal rate and regular rhythm.   Pulmonary:      Effort: No respiratory distress.      Breath sounds: No wheezing.      Comments: On nasal cannula  Abdominal:      Palpations: Abdomen is soft.      Tenderness: There is no abdominal tenderness.   Skin:     Comments: Kerlix wrapping to left lower extremity with underlying erythema, serosanguineous discharge lateral aspect of dressing   Neurological:      Mental Status: He is alert and oriented to person, place, and time.   Psychiatric:         Mood and Affect: Mood normal.                 Significant Labs: BMP:   Recent Labs   Lab 10/17/22  0658   *      K 3.2*      CO2 25   BUN 20   CREATININE 0.8   CALCIUM 9.5   MG 2.0     CBC:   Recent Labs   Lab 10/16/22  0259 10/17/22  0657   WBC  17.32*  17.32* 19.97*   HGB 10.8*  10.8* 11.7*   HCT 32.8*  32.8* 36.2*     234 325     CMP:   Recent Labs   Lab 10/16/22  0259 10/17/22  0658    141   K 2.6* 3.2*   CL 98 102   CO2 27 25   * 142*   BUN 54* 20   CREATININE 1.9* 0.8   CALCIUM 8.6* 9.5   PROT 6.7 7.3   ALBUMIN 2.6* 2.7*   BILITOT 1.1* 0.8   ALKPHOS 196* 213*   AST 97* 75*   ALT 62* 68*   ANIONGAP 11 14     Cardiac Markers:   Recent Labs   Lab 10/16/22  0259   *     Magnesium:   Recent Labs   Lab 10/16/22  0259 10/17/22  0658   MG 1.9 2.0     POCT Glucose: No results for input(s): POCTGLUCOSE in the last 48 hours.    Significant Imaging: I have reviewed all pertinent imaging results/findings within the past 24 hours.    X-Ray Tibia Fibula 2 View Left    Result Date: 10/14/2022  Reason: Leg Swelling with redness and weeping x2 days FINDINGS: 2 views of left tibia and fibula show no fracture, dislocation, or destructive osseous lesion. Diffuse subcutaneous edema is suggested throughout the visualized left lower extremity. Calcaneal enthesophyte arises near plantar fascia origin. IMPRESSION: Diffuse visualized left lower extremity subcutaneous edema suggested without acute bony abnormality. Electronically signed by:  Malik Valentin MD  10/14/2022 4:57 PM CDT Workstation: [id number]    US Lower Extremity Veins Left    Result Date: 10/14/2022  US LOWER EXTREMITY VEINS LIMITED FOLLOW-UP UNILATERAL CLINICAL HISTORY: 57 years Male swelling FINDINGS: Grayscale, color and spectral Doppler analysis of the left lower extremity deep venous system was performed. There is normal compressibility, with normal flow by color and spectral Doppler analysis in the left lower extremity deep venous system, with normal augmentation and no evidence of deep venous thrombosis. Subcutaneous edema of the left lower extremity. IMPRESSION: Negative for DVT. Electronically signed by:  Jules Snow MD  10/14/2022 6:10 PM CDT Workstation: [id number]    US  Kidney    Result Date: 10/15/2022  REASON: alex TECHNIQUE: Grayscale and color Doppler ultrasound of the kidneys. COMPARISON: None. FINDINGS: Right kidney measures 11.9 x 6.4 x 6.5 cm. Left kidney measures 11.6 x 7.6 x 5.7 cm. There is no hydronephrosis or nephrolithiasis. No renal cyst or mass. The bladder is mostly decompressed. Prevoid volume 44.6 mL. Postvoid volume 25.1 mL. IMPRESSION: Unremarkable ultrasound of the kidneys and bladder. Electronically signed by:  Chase Mayo DO  10/15/2022 1:53 PM CDT Workstation: [id number]    CT Leg (Tibia-Fibula) Without Contrast Left    Result Date: 10/16/2022  CMS MANDATED QUALITY DATA - CT RADIATION  436 All CT scans at this facility utilize dose modulation, iterative reconstruction, and/or weight based dosing when appropriate to reduce radiation dose to as low as reasonably achievable. CT LOWER EXTREMITY WITHOUT IV CONTRAST CLINICAL HISTORY: 57 years Male Soft tissue mass, lower leg, US/xray nondiagnostic; Soft tissue mass, lower leg, deep; RAPID  increased in cellulitis with tense calf spreading up to thigh , possible abscess?  nec fascitis ? unable to use contrast COMPARISON: October 14, 2022 radiography FINDINGS: No acute fracture or malalignment of the tibia or fibula. No aggressive osseous lesion. Extensive, severe subcutaneous edema and skin thickening about the left lower extremity. No soft tissue gas. No radiopaque foreign body. No well-defined abscess/drainable fluid collection on this noncontrast exam. Along the posterior medial aspect of the left lower extremity, there is a subcutaneous/dermal lesion measuring 2.3 x 0.8 cm. IMPRESSION: Severe extensive subcutaneous edema of the left lower extremity. No acute osseous abnormality. Negative for soft tissue gas. Dermal lesion along the posterior medial aspect of the calf. Please correlate with physical exam. Electronically signed by:  Jules Snow MD  10/16/2022 1:58 PM CDT Workstation: [id number]      Echocardiogram  Summary     The estimated PA systolic pressure is 27 mmHg.   The left ventricle is normal in size with concentric remodeling and normal systolic function. No WMA.   The estimated ejection fraction is 58%.   Normal left ventricular diastolic function.   Normal right ventricular size.   Mild left atrial enlargement.   Mild mitral regurgitation.   Mild tricuspid regurgitation.   Normal central venous pressure (3 mmHg).         Assessment/Plan:     Active Hospital Problems    Diagnosis    *Cellulitis of left lower extremity    Leucocytosis    Severe obesity (BMI >= 40)    Type 2 diabetes mellitus, without long-term current use of insulin, HBA1C 6.3%    Hypokalemia    ELLIE (obstructive sleep apnea) cannot tolerate cpap    S/P coronary artery stent placement - LAD 08/09/2019    Hyperlipidemia, mixed    Essential hypertension, benign    Alcohol abuse, daily use     Plan:  Continue care on medical floor   On 10/17 status post OR I&D, no purulence, follow-up cultures submitted  Elevation left lower extremity   Continue vancomycin and clindamycin, managed by Infectious Disease   Continue p.r.n. pain control as ordered  Wound care consult, daily dressing to left lower extremity   Discontinue IV fluids, continue oral diet and monitoring  Decrease standing dose oral potassium supplementation to 20 mEq daily and monitor  Resume home atenolol, continue to hold amlodipine, chlorthalidone, Lasix, restart as indicated   ELLIE, not using CPAP, continue counseling  Needs lifestyle modification for weight loss   Previous echocardiogram with EF of 58%   Increase activity, out of bed to chair, mobilize   A.m. labs ordered  Appreciate all consultant's input   Further plan as per clinical course    VTE Risk Mitigation (From admission, onward)         Ordered     enoxaparin injection 40 mg  Every 12 hours         10/17/22 0921     IP VTE HIGH RISK PATIENT  Once         10/15/22 0109     Place sequential  compression device  Until discontinued         10/15/22 0109                Discharge Planning   QUINTIN:      Code Status: Full Code   Is the patient medically ready for discharge?:     Reason for patient still in hospital (select all that apply): Patient trending condition  Discharge Plan A: Home                  Yennifer Bojorquez MD  Department of Hospital Medicine   [facility]

## 2022-10-17 NOTE — PROGRESS NOTES
Pharmacokinetic Assessment Follow Up: IV Vancomycin    Patient brief summary:  Be Shipman is a 57 y.o. male initiated on antimicrobial therapy with IV Vancomycin for treatment of Skin & Soft Tissue infection    The patient's current regimen is intermittent dosing based on random levels due to decreased renal function      Actual Body Weight = (!) 159.2 kg (350 lb 15.6 oz).    Renal Function:   Estimated Creatinine Clearance: 158.8 mL/min (based on SCr of 0.8 mg/dL).      Vancomycin serum concentration assessment(s):    The random level was drawn correctly and can be used to guide therapy at this time. The measurement is below the desired definitive target range of 10 to 15 mcg/mL.    Vancomycin Regimen Plan:    Change regimen to Vancomycin 2000 mg IV every 12 hours with next serum trough concentration measured at 0900 prior to 5th dose on 10/18    Drug levels (last 3 results):  Recent Labs   Lab Result Units 10/16/22  0259 10/17/22  0658   Vancomycin, Random ug/mL 7.4 3.8          Pharmacy will continue to follow and monitor vancomycin.    Please contact pharmacy at extension [phone] for questions regarding this assessment.    Thank you for the consult,   Raj Guo

## 2022-10-17 NOTE — PROGRESS NOTES
Pharmacist Renal Dose Adjustment Note    Be Shipman is a 57 y.o. male being treated with the medication enoxaparin    Patient Data:    Vital Signs (Most Recent):  Temp: 97.5 °F (36.4 °C) (10/17/22 0717)  Pulse: 82 (10/17/22 0717)  Resp: 18 (10/17/22 0717)  BP: 120/62 (10/17/22 0717)  SpO2: (!) 94 % (10/17/22 0717)   Vital Signs (72h Range):  Temp:  [97.5 °F (36.4 °C)-99.7 °F (37.6 °C)]   Pulse:  [69-92]   Resp:  [16-18]   BP: ()/(39-92)   SpO2:  [93 %-98 %]      Recent Labs   Lab 10/15/22  0525 10/16/22  0259 10/17/22  0658   CREATININE 5.4* 1.9* 0.8     Serum creatinine: 0.8 mg/dL 10/17/22 0658  Estimated creatinine clearance: 158.8 mL/min    Medication:enoxaparin 40 mg Q 24 hours will be changed to 40 mg Q 12 hours for increased crcl > 30 ml/min and bmi > 40     Pharmacist's Name: Raj Guo  Pharmacist's Extension: [phone]

## 2022-10-17 NOTE — CONSULTS
GENERAL SURGERY  INPATIENT CONSULT    REASON FOR CONSULT: Left lower extremity cellulitis    HPI: Be Shipman is a 57 y.o. male with with obesity, hypertension, hyperlipidemia who had 3 days' history of left lower extremity swelling and redness with aching pain. Found have leukocytosis and acute kidney injury. Admitted to Medicine and started on IV antibiotics. X-ray showed no bony abnormality.  Ultrasound showed no DVT. Despite antibiotics had worsening swelling and spreading of redness of the left lower extremity up to the thigh.  General surgery was consulted for evaluation.  Patient did undergo a noncontrast CT scan which failed to show any large fluid collection but instead extensive subcutaneous fat stranding consistent with cellulitis. Patient reports mild improvement in symptoms though still with severe swelling, redness and discomfort.    I have reviewed the patient's chart including prior progress notes, procedures and testing.     ROS:   Review of Systems   All other systems reviewed and are negative.    PROBLEM LIST:  Patient Active Problem List   Diagnosis    Morbid obesity    Essential hypertension, benign    Alcohol abuse, daily use    Hyperlipidemia, mixed    Family history of premature CAD    Achilles tendinitis of left lower extremity    Acute left ankle pain    Moderate left ankle sprain    SOB (shortness of breath)    Chest pain    Angina of effort    Abnormal nuclear stress test    CAD in native artery    S/P coronary artery stent placement - LAD 08/09/2019    Diabetic foot infection    ELLIE (obstructive sleep apnea) cannot tolerate cpap    Cellulitis of right lower extremity    Diabetes mellitus type 2, noninsulin dependent    Acute renal failure superimposed on stage 5 chronic kidney disease, not on chronic dialysis    Cellulitis of left lower extremity    Hypokalemia    Iron deficiency anemia    Swelling    Sepsis         HISTORY  Past Medical History:   Diagnosis Date    Anticoagulant  long-term use     Coronary artery disease     Diabetes mellitus     Hyperlipidemia     Hypertension     Morbid obesity        Past Surgical History:   Procedure Laterality Date    CORONARY STENT PLACEMENT  08/09/2019    pt has card in [facility] Dr. Grimm    HAND SURGERY Left     22 y/o, laceration repair    HERNIA REPAIR      umbilical hernia    INCISION AND DRAINAGE FOOT Right 09/13/2021    Procedure: INCISION AND DRAINAGE, FOOT;  Surgeon: Juan Jose Julio DPM;  Location: [facility] OR;  Service: Podiatry;  Laterality: Right;    left breast tumor removed (benign)       LEFT HEART CATHETERIZATION Left 08/09/2019    Procedure: Left heart cath;  Surgeon: Erik Jessica MD;  Location: [facility] CATH;  Service: Cardiology;  Laterality: Left;       Social History     Tobacco Use    Smoking status: Former    Smokeless tobacco: Former     Quit date: 7/22/2000    Tobacco comments:     quit 12 years ago   Substance Use Topics    Alcohol use: Yes     Comment: every day 1/2 a fifth of whiskey per day    Drug use: No       Family History   Problem Relation Age of Onset    Heart disease Mother         CAD    Hypertension Mother     Cancer Sister         breast    Heart disease Sister 50    Heart disease Brother         MI    Stroke Brother     Heart disease Brother         MI    Heart disease Brother         MI         MEDS:  No current facility-administered medications on file prior to encounter.     Current Outpatient Medications on File Prior to Encounter   Medication Sig Dispense Refill    allopurinoL (ZYLOPRIM) 300 MG tablet TAKE 1 TABLET BY MOUTH EVERY DAY (Patient taking differently: Take 300 mg by mouth once daily.) 90 tablet 0    amLODIPine (NORVASC) 10 MG tablet Take 1 tablet (10 mg total) by mouth once daily. 90 tablet 3    atenoloL (TENORMIN) 50 MG tablet Take 50 mg by mouth every evening.      atenoloL-chlorthalidone (TENORETIC) 100-25 mg per tablet Take 1 tablet by mouth once daily.      atorvastatin (LIPITOR)  40 MG tablet TAKE 1 TABLET BY MOUTH EVERY DAY (Patient taking differently: Take 40 mg by mouth once daily.) 90 tablet 0    cloNIDine (CATAPRES) 0.1 MG tablet Take 1 tablet (0.1 mg total) by mouth 2 (two) times daily. 180 tablet 0    clopidogreL (PLAVIX) 75 mg tablet TAKE 1 TABLET BY MOUTH EVERY DAY (Patient taking differently: Take 75 mg by mouth once daily.) 90 tablet 3    esomeprazole (NEXIUM) 40 MG capsule Take 40 mg by mouth before breakfast.      furosemide (LASIX) 20 MG tablet Take 1 tablet (20 mg total) by mouth once daily. 90 tablet 0    olmesartan (BENICAR) 40 MG tablet Take 1 tablet (40 mg total) by mouth once daily. 90 tablet 3    potassium chloride SA (K-DUR,KLOR-CON) 20 MEQ tablet TAKE 1 TABLET BY MOUTH EVERY DAY (Patient taking differently: Take 20 mEq by mouth once daily.) 90 tablet 0    metFORMIN (GLUCOPHAGE-XR) 500 MG ER 24hr tablet TAKE 1 TABLET BY MOUTH EVERY DAY WITH BREAKFAST (Patient taking differently: Take 500 mg by mouth daily with breakfast.) 90 tablet 0    nitroGLYCERIN (NITROSTAT) 0.4 MG SL tablet Place 1 tablet (0.4 mg total) under the tongue every 5 (five) minutes as needed for Chest pain. 25 tablet 2       ALLERGIES:  Review of patient's allergies indicates:   Allergen Reactions    Tetanus vaccines and toxoid Anaphylaxis, Hives and Rash         VITALS:  Temp:  [97.5 °F (36.4 °C)-99.7 °F (37.6 °C)] 97.5 °F (36.4 °C)  Pulse:  [72-87] 82  Resp:  [16-18] 18  SpO2:  [93 %-98 %] 94 %  BP: (117-159)/(62-92) 120/62    I/O last 3 completed shifts:  In: 3404.6 [P.O.:1020; I.V.:2334.6; IV Piggyback:50]  Out: 6525 [Urine:6525]      PHYSICAL EXAM:  Physical Exam  Vitals reviewed.   Constitutional:       General: He is not in acute distress.     Appearance: Normal appearance. He is well-developed. He is obese.   HENT:      Head: Normocephalic and atraumatic.   Eyes:      General: No scleral icterus.  Neck:      Trachea: No tracheal deviation.   Cardiovascular:      Rate and Rhythm: Normal rate and  regular rhythm.      Pulses: Normal pulses.   Pulmonary:      Effort: Pulmonary effort is normal. No respiratory distress.      Breath sounds: Normal breath sounds.   Abdominal:      General: There is no distension.      Palpations: Abdomen is soft.      Tenderness: There is no abdominal tenderness.   Musculoskeletal:         General: Swelling and tenderness present. Normal range of motion.      Cervical back: Normal range of motion and neck supple. No rigidity.      Right lower leg: No edema.   Skin:     General: Skin is warm and dry.      Coloration: Skin is not jaundiced.      Findings: Erythema present.   Neurological:      General: No focal deficit present.      Mental Status: He is alert and oriented to person, place, and time. He is not disoriented.      Motor: No weakness or abnormal muscle tone.   Psychiatric:         Mood and Affect: Mood normal.         Behavior: Behavior normal.         Thought Content: Thought content normal.         Judgment: Judgment normal.                   LABS:  Lab Results   Component Value Date    WBC 19.97 (H) 10/17/2022    RBC 4.02 (L) 10/17/2022    HGB 11.7 (L) 10/17/2022    HCT 36.2 (L) 10/17/2022     10/17/2022     Lab Results   Component Value Date     (H) 10/17/2022     10/17/2022    K 3.2 (L) 10/17/2022     10/17/2022    CO2 25 10/17/2022    BUN 54 (H) 10/16/2022    CREATININE 1.9 (H) 10/16/2022    CALCIUM 9.5 10/17/2022     Lab Results   Component Value Date    ALT 62 (H) 10/16/2022    AST 97 (H) 10/16/2022    ALKPHOS 196 (H) 10/16/2022    BILITOT 1.1 (H) 10/16/2022     Lab Results   Component Value Date    MG 1.9 10/16/2022    PHOS 2.9 09/11/2021       STUDIES:  Images and reports were personally reviewed.    CT left tib-fib  FINDINGS:     No acute fracture or malalignment of the tibia or fibula. No aggressive osseous lesion.     Extensive, severe subcutaneous edema and skin thickening about the left lower extremity. No soft tissue gas. No  radiopaque foreign body. No well-defined abscess/drainable fluid collection on this noncontrast exam.     Along the posterior medial aspect of the left lower extremity, there is a subcutaneous/dermal lesion measuring 2.3 x 0.8 cm.     IMPRESSION:     Severe extensive subcutaneous edema of the left lower extremity.     No acute osseous abnormality.     Negative for soft tissue gas.     Dermal lesion along the posterior medial aspect of the calf. Please correlate with physical exam.    ASSESSMENT & PLAN:  57 y.o. male with left lower extremity cellulitis  -despite no obvious fluid collection on exam or imaging the patient has worsening leukocytosis and spreading erythema  -I discussed with him continue antibiotics versus exploration the OR with likely Penrose drain placement to allow for drainage of the leg, patient agreed proceed with surgical intervention  -has been NPO  -to OR this morning  -will obtain cultures

## 2022-10-17 NOTE — PROGRESS NOTES
10/17/22 1038   Handoff Report   Given To JINA Alcala     Nursing Transfer Note      10/17/2022     Reason patient is being transferred: Return to room post op recovery    Transfer To: 1115    Transfer via bed    Transfer with 2 liters NC to O2    Transported by VENTURA Burroughs RN     Medicines sent: None    Any special needs or follow-up needed: None    Chart send with patient: Yes    Notified: No one here     Patient reassessed at: 10/17/2022, 1038    Upon arrival to floor: cardiac monitor applied, patient oriented to room, call bell in reach, and bed in lowest position

## 2022-10-17 NOTE — PROGRESS NOTES
"Consult Note  Infectious Disease    Reason for Consult:  cellulitis    HPI: Be Shipman is a 57 y.o. male with past medical history of obesity, BMI 48, HTN, DLP, diet-controlled diabetes, hemoglobin A1c 6.7--6.3.  came to ED complaining of left lower extremity swelling redness and pain x3 days duration, progressively worse.  This happened to 3 days after he had some food poisoning after eating some chicken that set out for a day.      In ED, he was found hypotensive and responded to fluids.  Left leg X-ray ruled out foreign body; ultrasound ruled out DVT.  He was admitted and started on cefepime on vancomycin for cellulitis, today is day 3 of treatment.  Blood cultures are negative to date.  WBC is not changing much 18--19--17.  Nephrologist is following for BRENDAN.  Creatinine is improved 4.7--5.4--1.9.      I came and saw patient.  I cleaned his left leg.  Collected cultures from one of the blisters on the medial part of the left leg.  Patient is allergic to tetanus and of course is not up-to-date.    10/17: ([place]) d/w Dr. Carranza. S/p limited exploration of soft tissue with no findings of purulence. The nausea and vomiting preceded the development of cellulitis and may be due to the strep infection rather than the food poisoning that he presumed he had. He is wheezing and does indicate that he has some "phlegm".     Antibiotics (From admission, onward)      Start     Stop Route Frequency Ordered    10/17/22 1000  vancomycin (VANCOCIN) 2,000 mg in dextrose 5 % 500 mL IVPB         -- IV Every 12 hours (non-standard times) 10/17/22 0846    10/17/22 0900  silver sulfADIAZINE 1% cream         -- Top Daily 10/16/22 2117    10/17/22 0000  clindamycin capsule 300 mg         -- Oral Every 6 hours 10/16/22 1932    10/15/22 0109  vancomycin - pharmacy to dose  (vancomycin IVPB)        See [facility] for full Linked Orders Report.    -- IV pharmacy to manage frequency 10/15/22 0109          Antifungals (From admission, " onward)      None             EXAM & DIAGNOSTICS REVIEWED:   Vitals:     Temp:  [97.2 °F (36.2 °C)-99.7 °F (37.6 °C)]   Temp: 97.7 °F (36.5 °C) (10/17/22 1139)  Pulse: 84 (10/17/22 1140)  Resp: 14 (10/17/22 1139)  BP: 137/83 (10/17/22 1140)  SpO2: 97 % (10/17/22 1140)    Intake/Output Summary (Last 24 hours) at 10/17/2022 1621  Last data filed at 10/17/2022 1150  Gross per 24 hour   Intake 1031.67 ml   Output 3010 ml   Net -1978.33 ml       General:  In NAD. Alert and attentive, cooperative, comfortable  Eyes:  Anicteric, , EOMI  ENT:  No ulcers, exudates, thrush, nares patent, dentition is fair  Neck:  supple,    Lungs: Mild expiratory wheeze  Heart:  RRR, no gallop/murmur/rub noted  Abd:  Soft, obese,  NT, ND, normal BS, no masses or organomegaly appreciated.  :  Voids,   Musc:  Joints without effusion, swelling, erythema, synovitis, muscle wasting.   Skin:  See picture . Tinea minimal.     Neuro:             Alert, attentive, speech fluent, face symmetric, moves all extremities, no focal weakness. Ambulatory  Psych:  Calm, cooperative  Lymphatic:       Extrem: Left leg is swollen, red, tender to minimal movement.  No lymph node enlargement.    Some tinea pedis present See pictures of left leg, otherwise No edema, erythema, phlebitis, cellulitis, warm and well perfused  VAD:       Isolation:    Wound:                      General Labs reviewed:  Recent Labs   Lab 10/15/22  0525 10/16/22  0259 10/17/22  0657   WBC 19.19* 17.32*  17.32* 19.97*   HGB 10.4* 10.8*  10.8* 11.7*   HCT 31.3* 32.8*  32.8* 36.2*    234  234 325       Recent Labs   Lab 10/14/22  1615 10/15/22  0525 10/16/22  0259 10/17/22  0658   * 130* 136 141   K 2.6* 2.4* 2.6* 3.2*   CL 91* 93* 98 102   CO2 25 25 27 25   BUN 55* 70* 54* 20   CREATININE 4.7* 5.4* 1.9* 0.8   CALCIUM 8.4* 8.3* 8.6* 9.5   PROT 7.7  --  6.7 7.3   BILITOT 1.4*  --  1.1* 0.8   ALKPHOS 124  --  196* 213*   ALT 43  --  62* 68*   AST 86*  --  97* 75*     Recent  Labs   Lab 10/17/22  0658   CRP 22.13*         Micro:  Microbiology Results (last 7 days)       Procedure Component Value Units Date/Time    Gram stain [[id number]] Collected: 10/17/22 0920    Order Status: Completed Specimen: Wound from Leg, Left Updated: 10/17/22 1405     Gram Stain Result No WBC's      No organisms seen    Narrative:      Leg, Left Lower Extremity    Fungus culture [[id number]] Collected: 10/17/22 0920    Order Status: Sent Specimen: Wound from Leg, Left Updated: 10/17/22 0956    AFB Culture & Smear [[id number]] Collected: 10/17/22 0920    Order Status: Sent Specimen: Wound from Leg, Left Updated: 10/17/22 0956    Culture, Anaerobic [[id number]] Collected: 10/17/22 0920    Order Status: Sent Specimen: Wound from Leg, Left Updated: 10/17/22 0956    Aerobic culture [[id number]] Collected: 10/17/22 0920    Order Status: Sent Specimen: Wound from Leg, Left Updated: 10/17/22 0955    Aerobic culture [[id number]] Collected: 10/16/22 1800    Order Status: Completed Specimen: Skin from Leg, Left Updated: 10/17/22 0647     Aerobic Bacterial Culture No growth    Narrative:      bullae    Culture, Anaerobic [[id number]] Collected: 10/16/22 1800    Order Status: Sent Specimen: Skin from Leg, Left Updated: 10/16/22 2006    Blood culture #1 **CANNOT BE ORDERED STAT** [[id number]] Collected: 10/14/22 1615    Order Status: Completed Specimen: Blood from Peripheral, Forearm, Left Updated: 10/16/22 1832     Blood Culture, Routine No Growth to date      No Growth to date      No Growth to date    Blood culture #2 **CANNOT BE ORDERED STAT** [[id number]] Collected: 10/14/22 1643    Order Status: Completed Specimen: Blood from Peripheral, Antecubital, Right Updated: 10/16/22 1832     Blood Culture, Routine No Growth to date      No Growth to date      No Growth to date    Urine culture [[id number]] Collected: 10/14/22 1649    Order Status: Completed Specimen: Urine Updated: 10/16/22 0635     Urine Culture, Routine Multiple  "organisms isolated. None in predominance.  Repeat if      clinically necessary.    Narrative:      Specimen Source->Urine            Imaging Reviewed:  X-ray leg  Diffuse visualized left lower extremity subcutaneous edema suggested without acute bony abnormality.     Ultrasound of left leg negative for DVT     CT lower extremity  Severe extensive subcutaneous edema of the left lower extremity.   No acute osseous abnormality.   Negative for soft tissue gas.   Dermal lesion along the posterior medial aspect of the calf. Please correlate with physical exam.     Cardiology:  Reviewed EC HO in chart.  EF 58%.  PASP 27.  Mild mitral regurg, mild tricuspid regurg,    IMPRESSION & PLAN     Severe Left lower extremity cellulitis, probably due to Streptococcus  S/p I and D without findings of purulence  Tinea pedis, local treatment   Pyuria on admission, WBC of 32; no complaints  Recent "food poisoning"  BRENDAN, dehydration, resolved  Probable diabetes, needs to be on diabetic diet.  Patient denies having diabetes to me.    Past medical history of obesity, BMI 48, HTN, D LP    Recommendations:  Adjusting to ancef alone  Topical antifungal  albuterol  Can not give Tdap because of history of allergic reaction   Elevate leg    Tight glucose control.    Follow cultures      Medical Decision Making during this encounter was  [_] Low Complexity  [_] Moderate Complexity  [ xx ] High Complexity     "

## 2022-10-17 NOTE — OP NOTE
DATE OF PROCEDURE: 10/17/2022    PREOPERATIVE DIAGNOSIS: Left lower extremity cellulitis    POSTOPERATIVE DIAGNOSIS: Same    PROCEDURE: Incision drainage of left lower extremity with Penrose drain placement    SURGEON: Jori Rivas M.D    ASSISTANT: None    ANESTHESIA: Local MAC    ESTIMATED BLOOD LOSS: 20 cc    SPECIMEN: Fluid for cultures    CONDITION: Stable    COMPLICATIONS: None    FINDINGS:   1. No significant undrained fluid collection or purulence encountered     INDICATIONS: The patient is a 57-year-old male with left lower extremity cellulitis. Had worsening despite antibiotics.  Imaging failed to show obvious fluid collection but due to worsening we recommended exploration in the OR.    PROCEDURE IN DETAIL: Patient taken operating room placed in supine position where monitored anesthesia care was administered.  Was on scheduled antibiotics.  His left lower extremity was prepped and draped in typical sterile fashion.  Time-out performed by members of the operative team. Patient has significant edema to his lower leg with erythema of the entire lower leg extending up into the medial thigh. There were no areas of fluctuance however on the posterior aspect there was significant amount of skin bullae likely from pressure. In the anterior medial aspect I injected local anesthetic and made 2 stab incisions at the upper aspect and lower aspect of the leg. There was serosanguineous drainage that came out but no purulence. I attempted explored these wounds with a hemostat but there is no tracking and no evidence of necrotizing soft tissue infection. On the lateral aspect I again injected local anesthetic and made 2 stab incisions. Same findings were present with serosanguineous fluid but no purulence.  No significant tracking. On more of the posterior calf where there was a significant amount of bulla formation I made 2 stab incisions after injecting local anesthetic.  Again only serosanguineous fluid was  present. I was able to pass a Penrose drain between these 2 incisions which was tied to itself. Through the superior anterior medial incision I passed an additional drain 2 1 of the posterior incision sites and tied to itself. Cultures were taken. Gauze bandage was applied.  Patient was aroused from sedation taken to the recovery room stable condition having suffered no complications.  All counts were correct x2 the case.  I was present scrubbed throughout all operative portions of the case.    DISPO: Returned to floor

## 2022-10-17 NOTE — PROGRESS NOTES
"INPATIENT NEPHROLOGY PROGRESS  [facility] NEPHROLOGY    Be Shipman  10/17/2022    Reason for consultation:    Acute kidney injury    Chief Complaint:   Chief Complaint   Patient presents with    Leg Swelling     LLE swelling and weeping, redness x2 days ago.      History of Present Illness:    Per H and P    57 year old male with history of HTN, HLD, Morbid Obesity presented to ED complaining of pain, swelling and redness of LLE X 3 days, progressively worsening in swelling and redness. Is 4-8/10 painful (aching), waxing and waning of own accord, but does worsen with usage of limb, with radiations towards groin. Has been taking "A lot" of ibuprofen for the pain. Was hypotensive (90's) coming through the door, which has responded to volume.    10/15  consulted for BRENDAN.  Pt had leg pain.  He stated that he had food poisoning starting on Monday and had severe nausea and vomiting and diarrhea all week.  He still has diarrhea.  No nausea.  Appetite is intact.  No confusion.  He denies NSAID use.  He was only taking tylenol for pain  10/16  VSS, renal function improving.  Hypokalemic, got IV repletion.  Feels better, no n/v/d.  10/17 VSS, no new complains.    Plan of Care:    BRENDAN due to hemodynamic compromise from volume depletion and hypotension, infection causing mild ATN  CKD stage 2  --renal us/bladder us is OK  --rheumatologic serologies given proteinuria and microhematuria, negative  --pt had normal sCr in July, he doesn't have stage 5 chronic kidney disease    Hypokalemia  --continue oral KCL and KCL with IVF  --non-renal diet when able to eat  --Mg is OK  --daily labs    Anemia  --iron panel and paraproteins normal  --monitor.    Cellulitis of LLE  --monitor for vanco toxicity  --appreciate ID and Surgery input    HTN  -- resume BP meds as needed    Thank you for allowing us to participate in this patient's care. We will continue to follow.    Vital Signs:  Temp Readings from Last 3 Encounters:   10/17/22 " 97.2 °F (36.2 °C) (Temporal)   05/27/22 97.7 °F (36.5 °C)   11/05/21 97.9 °F (36.6 °C)       Pulse Readings from Last 3 Encounters:   10/17/22 91   05/27/22 77   11/05/21 99       BP Readings from Last 3 Encounters:   10/17/22 (!) 147/70   05/27/22 136/84   11/05/21 (!) 136/92       Weight:  Wt Readings from Last 3 Encounters:   10/15/22 (!) 159.2 kg (350 lb 15.6 oz)   05/27/22 (!) 158.3 kg (349 lb)   11/05/21 (!) 156.5 kg (345 lb)       Past Medical & Surgical History:  Past Medical History:   Diagnosis Date    Anticoagulant long-term use     Coronary artery disease     Diabetes mellitus     Hyperlipidemia     Hypertension     Morbid obesity        Past Surgical History:   Procedure Laterality Date    CORONARY STENT PLACEMENT  08/09/2019    [facility]    HAND SURGERY Left     20 y/o, laceration repair    HERNIA REPAIR      umbilical hernia    INCISION AND DRAINAGE FOOT Right 09/13/2021    Procedure: INCISION AND DRAINAGE, FOOT;  Surgeon: Juan Jose Julio DPM;  Location: [facility] OR;  Service: Podiatry;  Laterality: Right;    left breast tumor removed (benign)       LEFT HEART CATHETERIZATION Left 08/09/2019    Procedure: Left heart cath;  Surgeon: Erik Jessica MD;  Location: [facility] CATH;  Service: Cardiology;  Laterality: Left;       Past Social History:  Social History     Socioeconomic History    Marital status:    Tobacco Use    Smoking status: Former    Smokeless tobacco: Former     Quit date: 7/22/2000    Tobacco comments:     quit 12 years ago   Substance and Sexual Activity    Alcohol use: Yes     Comment: every day 1/2 a fifth of whiskey per day    Drug use: No    Sexual activity: Yes     Social Determinants of Health     Financial Resource Strain: Low Risk     Difficulty of Paying Living Expenses: Not very hard   Food Insecurity: No Food Insecurity    Worried About Running Out of Food in the Last Year: Never true    Ran Out of Food in the Last Year: Never true    Transportation Needs: No Transportation Needs    Lack of Transportation (Medical): No    Lack of Transportation (Non-Medical): No   Physical Activity: Sufficiently Active    Days of Exercise per Week: 5 days    Minutes of Exercise per Session: 30 min   Stress: No Stress Concern Present    Feeling of Stress : Not at all   Social Connections: Moderately Isolated    Frequency of Communication with Friends and Family: More than three times a week    Frequency of Social Gatherings with Friends and Family: More than three times a week    Attends Sabianism Services: Never    Active Member of Clubs or Organizations: No    Attends Club or Organization Meetings: Never    Marital Status:    Housing Stability: Low Risk     Unable to Pay for Housing in the Last Year: No    Number of Places Lived in the Last Year: 1    Unstable Housing in the Last Year: No       Medications:  No current facility-administered medications on file prior to encounter.     Current Outpatient Medications on File Prior to Encounter   Medication Sig Dispense Refill    allopurinoL (ZYLOPRIM) 300 MG tablet TAKE 1 TABLET BY MOUTH EVERY DAY (Patient taking differently: Take 300 mg by mouth once daily.) 90 tablet 0    amLODIPine (NORVASC) 10 MG tablet Take 1 tablet (10 mg total) by mouth once daily. 90 tablet 3    atenoloL (TENORMIN) 50 MG tablet Take 50 mg by mouth every evening.      atenoloL-chlorthalidone (TENORETIC) 100-25 mg per tablet Take 1 tablet by mouth once daily.      atorvastatin (LIPITOR) 40 MG tablet TAKE 1 TABLET BY MOUTH EVERY DAY (Patient taking differently: Take 40 mg by mouth once daily.) 90 tablet 0    cloNIDine (CATAPRES) 0.1 MG tablet Take 1 tablet (0.1 mg total) by mouth 2 (two) times daily. 180 tablet 0    clopidogreL (PLAVIX) 75 mg tablet TAKE 1 TABLET BY MOUTH EVERY DAY (Patient taking differently: Take 75 mg by mouth once daily.) 90 tablet 3    esomeprazole (NEXIUM) 40 MG capsule Take 40 mg by mouth before breakfast.       furosemide (LASIX) 20 MG tablet Take 1 tablet (20 mg total) by mouth once daily. 90 tablet 0    olmesartan (BENICAR) 40 MG tablet Take 1 tablet (40 mg total) by mouth once daily. 90 tablet 3    potassium chloride SA (K-DUR,KLOR-CON) 20 MEQ tablet TAKE 1 TABLET BY MOUTH EVERY DAY (Patient taking differently: Take 20 mEq by mouth once daily.) 90 tablet 0    metFORMIN (GLUCOPHAGE-XR) 500 MG ER 24hr tablet TAKE 1 TABLET BY MOUTH EVERY DAY WITH BREAKFAST (Patient taking differently: Take 500 mg by mouth daily with breakfast.) 90 tablet 0    nitroGLYCERIN (NITROSTAT) 0.4 MG SL tablet Place 1 tablet (0.4 mg total) under the tongue every 5 (five) minutes as needed for Chest pain. 25 tablet 2     Scheduled Meds:   atorvastatin  40 mg Oral Daily    clindamycin  300 mg Oral Q6H    clopidogreL  75 mg Oral Daily    enoxaparin  40 mg Subcutaneous Q12H    ferrous sulfate  1 tablet Oral Daily    pantoprazole  40 mg Oral Before breakfast    potassium chloride  40 mEq Oral BID    silver sulfADIAZINE 1%   Topical (Top) Daily    vancomycin (VANCOCIN) IVPB  2,000 mg Intravenous Q12H     Continuous Infusions:   0/9% NACL & POTASSIUM CHLORIDE 20 MEQ/L 50 mL/hr at 10/16/22 1801     PRN Meds:.acetaminophen, diphenhydrAMINE, fentaNYL, hydrALAZINE, HYDROcodone-acetaminophen, magnesium oxide, magnesium oxide, melatonin, meperidine, morphine, nitroGLYCERIN, ondansetron, ondansetron, oxyCODONE, potassium bicarbonate, potassium bicarbonate, potassium bicarbonate, potassium, sodium phosphates, potassium, sodium phosphates, potassium, sodium phosphates, Pharmacy to dose Vancomycin consult **AND** vancomycin - pharmacy to dose    Allergies:  Tetanus vaccines and toxoid    Past Family History:  Reviewed; refer to Hospitalist Admission Note    Review of Systems:  Review of Systems - All 14 systems reviewed and negative, except as noted in HPI    Physical Exam:    BP (!) 147/70 (BP Location: Right arm, Patient Position: Lying)   Pulse 91   Temp 97.2  "°F (36.2 °C) (Temporal)   Resp 20   Ht 5' 11" (1.803 m)   Wt (!) 159.2 kg (350 lb 15.6 oz)   SpO2 97%   BMI 48.95 kg/m²     General Appearance:    Alert, cooperative, no distress, appears stated age   Head:    Normocephalic, without obvious abnormality, atraumatic   Eyes:    PER, conjunctiva/corneas clear, EOM's intact in both eyes        Throat:   Lips, mucosa, and tongue normal; teeth and gums normal   Back:     Symmetric, no curvature, ROM normal, no CVA tenderness   Lungs:     Clear to auscultation bilaterally, respirations unlabored   Chest wall:    No tenderness or deformity   Heart:    Regular rate and rhythm, S1 and S2 normal, no murmur, rub   or gallop   Abdomen:     Soft, non-tender, bowel sounds active all four quadrants,     no masses, no organomegaly   Extremities:   Left leg swollen and red   Pulses:   2+ and symmetric all extremities   MSK:   No joint or muscle swelling, tenderness or deformity   Skin:   Skin color, texture, turgor normal, no rashes or lesions   Neurologic:   CNII-XII intact, normal strength and sensation       Throughout.  No flap     Results:  Recent Labs   Lab 10/15/22  0525 10/16/22  0259 10/17/22  0658   * 136 141   K 2.4* 2.6* 3.2*   CL 93* 98 102   CO2 25 27 25   BUN 70* 54* 20   CREATININE 5.4* 1.9* 0.8   * 153* 142*       Recent Labs   Lab 10/14/22  1615 10/15/22  0525 10/16/22  0259 10/17/22  0658   CALCIUM 8.4* 8.3* 8.6* 9.5   ALBUMIN 3.1*  --  2.6* 2.7*   MG  --   --  1.9 2.0             No results for input(s): POCTGLUCOSE in the last 168 hours.    Recent Labs   Lab 11/05/21  0758 07/07/22  0758 10/15/22  0525   Hemoglobin A1C 6.0 6.7 H 6.3 H       Recent Labs   Lab 10/15/22  0525 10/16/22  0259 10/17/22  0657   WBC 19.19* 17.32*  17.32* 19.97*   HGB 10.4* 10.8*  10.8* 11.7*   HCT 31.3* 32.8*  32.8* 36.2*    234  234 325   MCV 90 91  91 90   MCHC 33.2 32.9  32.9 32.3   MONO 3.0* 4.0  4.0 6.0  CANCELED       Recent Labs   Lab 10/14/22  1615 " 10/16/22  0259 10/17/22  0658   BILITOT 1.4* 1.1* 0.8   PROT 7.7 6.7 7.3   ALBUMIN 3.1* 2.6* 2.7*   ALKPHOS 124 196* 213*   ALT 43 62* 68*   AST 86* 97* 75*       Recent Labs   Lab 04/10/21  0738 09/11/21  1612 10/14/22  1649   Color, UA Yellow Yellow Yellow   Appearance, UA  --  Clear Hazy A   Clarity, UA Clear  --   --    pH, UA 6.0 7.0 5.0   Specific Craig, UA 1.021 1.015 1.020   Protein, UA Negative Negative 1+ A   Glucose, UA  --  Negative Negative   Ketones, UA Negative Negative Trace A   Urobilinogen, UA 0.2 Negative 2.0-3.0 A   Bilirubin (UA)  --  Negative 1+ A   Occult Blood UA Negative Negative 1+ A   Nitrite, UA Negative Negative Negative   RBC, UA  --  1 7 H   WBC, UA  --  4 32 H   Bacteria  --  Negative Negative   Hyaline Casts, UA  --  1 47 A             Microbiology Results (last 7 days)       Procedure Component Value Units Date/Time    Gram stain [[id number]] Collected: 10/17/22 0920    Order Status: Sent Specimen: Wound from Leg, Left Updated: 10/17/22 0956    Fungus culture [[id number]] Collected: 10/17/22 0920    Order Status: Sent Specimen: Wound from Leg, Left Updated: 10/17/22 0956    AFB Culture & Smear [[id number]] Collected: 10/17/22 0920    Order Status: Sent Specimen: Wound from Leg, Left Updated: 10/17/22 0956    Culture, Anaerobic [[id number]] Collected: 10/17/22 0920    Order Status: Sent Specimen: Wound from Leg, Left Updated: 10/17/22 0956    Aerobic culture [[id number]] Collected: 10/17/22 0920    Order Status: Sent Specimen: Wound from Leg, Left Updated: 10/17/22 0955    Aerobic culture [[id number]] Collected: 10/16/22 1800    Order Status: Completed Specimen: Skin from Leg, Left Updated: 10/17/22 0647     Aerobic Bacterial Culture No growth    Narrative:      bullae    Culture, Anaerobic [[id number]] Collected: 10/16/22 1800    Order Status: Sent Specimen: Skin from Leg, Left Updated: 10/16/22 2006    Blood culture #1 **CANNOT BE ORDERED STAT** [[id number]] Collected: 10/14/22 161     Order Status: Completed Specimen: Blood from Peripheral, Forearm, Left Updated: 10/16/22 1832     Blood Culture, Routine No Growth to date      No Growth to date      No Growth to date    Blood culture #2 **CANNOT BE ORDERED STAT** [[id number]] Collected: 10/14/22 1643    Order Status: Completed Specimen: Blood from Peripheral, Antecubital, Right Updated: 10/16/22 1832     Blood Culture, Routine No Growth to date      No Growth to date      No Growth to date    Urine culture [[id number]] Collected: 10/14/22 1649    Order Status: Completed Specimen: Urine Updated: 10/16/22 0635     Urine Culture, Routine Multiple organisms isolated. None in predominance.  Repeat if      clinically necessary.    Narrative:      Specimen Source->Urine             Patient care was time spent personally by me on the following activities:   Obtaining a history  Examination of patient.  Providing medical care at the patients bedside.  Developing a treatment plan with patient or surrogate and bedside caregivers  Ordering and reviewing laboratory studies, radiographic studies, pulse oximetry.  Ordering and performing treatments and interventions.  Evaluation of patient's response to treatment.  Discussions with consultants while on the unit and immediately available to the patient.  Re-evaluation of the patient's condition.  Documentation in the medical record.     Prince Good MD  Nephrology  [facility] [place]  [phone]

## 2022-10-17 NOTE — ANESTHESIA POSTPROCEDURE EVALUATION
Anesthesia Post Evaluation    Patient: Be Shipman    Procedure(s) Performed: Procedure(s) (LRB):  INCISION AND DRAINAGE, LOWER EXTREMITY (Left)    Final Anesthesia Type: MAC      Patient location during evaluation: GI PACU  Patient participation: Yes- Able to Participate  Level of consciousness: awake and alert and oriented  Post-procedure vital signs: reviewed and stable  Pain management: adequate  Airway patency: patent    PONV status at discharge: No PONV  Anesthetic complications: no      Cardiovascular status: blood pressure returned to baseline and hemodynamically stable  Respiratory status: unassisted, spontaneous ventilation and nasal cannula  Hydration status: euvolemic  Follow-up not needed.          Vitals Value Taken Time   /67 10/17/22 1139   Temp 36.5 °C (97.7 °F) 10/17/22 1139   Pulse 85 10/17/22 1139   Resp 14 10/17/22 1139   SpO2 97 % 10/17/22 1139         Event Time   Out of Recovery 10/17/2022 10:26:00         Pain/Zonia Score: Pain Rating Prior to Med Admin: 4 (10/17/2022  6:01 AM)  Pain Rating Post Med Admin: 4 (10/16/2022  8:31 PM)  Zonia Score: 9 (10/17/2022 10:15 AM)

## 2022-10-17 NOTE — PLAN OF CARE
Problem: Adult Inpatient Plan of Care  Goal: Optimal Comfort and Wellbeing  Outcome: Ongoing, Progressing     Problem: Skin Injury Risk Increased  Goal: Skin Health and Integrity  Outcome: Ongoing, Progressing

## 2022-10-17 NOTE — ANESTHESIA PREPROCEDURE EVALUATION
10/17/2022  Be Shipman is a 57 y.o., male.    Patient Active Problem List   Diagnosis    Morbid obesity    Essential hypertension, benign    Alcohol abuse, daily use    Hyperlipidemia, mixed    Family history of premature CAD    Achilles tendinitis of left lower extremity    Acute left ankle pain    Moderate left ankle sprain    SOB (shortness of breath)    Chest pain    Angina of effort    Abnormal nuclear stress test    CAD in native artery    S/P coronary artery stent placement - LAD 08/09/2019    Diabetic foot infection    ELLIE (obstructive sleep apnea) cannot tolerate cpap    Cellulitis of right lower extremity    Diabetes mellitus type 2, noninsulin dependent    Acute renal failure superimposed on stage 5 chronic kidney disease, not on chronic dialysis    Cellulitis of left lower extremity    Hypokalemia    Iron deficiency anemia    Swelling    Sepsis       Past Surgical History:   Procedure Laterality Date    CORONARY STENT PLACEMENT  08/09/2019    [facility]    HAND SURGERY Left     20 y/o, laceration repair    HERNIA REPAIR      umbilical hernia    INCISION AND DRAINAGE FOOT Right 09/13/2021    Procedure: INCISION AND DRAINAGE, FOOT;  Surgeon: Juan Jose Julio DPM;  Location: [facility] OR;  Service: Podiatry;  Laterality: Right;    left breast tumor removed (benign)       LEFT HEART CATHETERIZATION Left 08/09/2019    Procedure: Left heart cath;  Surgeon: Erik Jessica MD;  Location: [facility] CATH;  Service: Cardiology;  Laterality: Left;        Tobacco Use:  The patient  reports that he has quit smoking. He quit smokeless tobacco use about 22 years ago.     Results for orders placed or performed during the hospital encounter of 09/11/21   EKG 12-lead    Collection Time: 09/11/21  1:38 PM    Narrative    Test Reason :  E11.628,L08.9,    Vent. Rate : 080 BPM     Atrial Rate : 080 BPM     P-R Int : 178 ms          QRS Dur : 100 ms      QT Int : 408 ms       P-R-T Axes : 075 019 040 degrees     QTc Int : 470 ms    Normal sinus rhythm  Normal ECG  When compared with ECG of 31-JUL-2019 14:17,  Previous ECG has undetermined rhythm, needs review  Confirmed by Richard Serra MD ([id number]) on 9/11/2021 4:00:33 PM    Referred By: [email or web address]   SELF           Confirmed By:Richard Serra MD             Lab Results   Component Value Date    WBC 19.97 (H) 10/17/2022    HGB 11.7 (L) 10/17/2022    HCT 36.2 (L) 10/17/2022    MCV 90 10/17/2022     10/17/2022     BMP  Lab Results   Component Value Date     10/16/2022    K 2.6 (LL) 10/16/2022    CL 98 10/16/2022    CO2 27 10/16/2022    BUN 54 (H) 10/16/2022    CREATININE 1.9 (H) 10/16/2022    CALCIUM 8.6 (L) 10/16/2022    ANIONGAP 11 10/16/2022     (H) 10/16/2022     (H) 10/15/2022     (H) 10/14/2022       Results for orders placed in visit on 04/29/21    Echo Color Flow Doppler? Yes    Interpretation Summary  · The estimated PA systolic pressure is 27 mmHg.  · The left ventricle is normal in size with concentric remodeling and normal systolic function. No WMA.  · The estimated ejection fraction is 58%.  · Normal left ventricular diastolic function.  · Normal right ventricular size.  · Mild left atrial enlargement.  · Mild mitral regurgitation.  · Mild tricuspid regurgitation.  · Normal central venous pressure (3 mmHg).            Pre-op Assessment    I have reviewed the Patient Summary Reports.    I have reviewed the Nursing Notes. I have reviewed the NPO Status.   I have reviewed the Medications.     Review of Systems  Anesthesia Hx:  No problems with previous Anesthesia  Denies Family Hx of Anesthesia complications.   Denies Personal Hx of Anesthesia complications.   Social:  Former Smoker    Hematology/Oncology:         -- Anemia:   Cardiovascular:   Hypertension,  well controlled CAD  CABG/stent (stent 2019)  Angina (hx of angina in past , denies recent angina) ECG has been reviewed.    Pulmonary:   Sleep Apnea (pt states cannot tolerate CPAP)    Education provided regarding risk of obstructive sleep apnea     Renal/:   Chronic Renal Disease, CKD    Musculoskeletal:   Arthritis (Gouty arthritis)     Endocrine:   Diabetes, poorly controlled, type 2    Dermatological:   Right foot abscess /ulceration   Psych:   Psychiatric History (daily EtOH use)          Physical Exam  General:  Well nourished and Morbid Obesity      Airway/Jaw/Neck:  Airway Findings: Mouth Opening: Normal   Tongue: Normal   General Airway Assessment: Adult Mallampati: III  TM Distance: Normal, at least 6 cm   Jaw/Neck Findings:  Neck ROM: Normal ROM        Chest/Lungs:  Chest/Lungs Findings: Clear to auscultation, Normal Respiratory Rate      Heart/Vascular:  Heart Findings: Rate: Normal  Rhythm: Regular Rhythm  Sounds: Normal     Abdomen:  Abdomen Findings: Normal    Musculoskeletal:  Musculoskeletal Findings: Normal   Skin:  Skin Findings: Normal    Mental Status:  Mental Status Findings:  Cooperative, Alert and Oriented         Anesthesia Plan  Type of Anesthesia, risks & benefits discussed:  Anesthesia Type:  MAC    Patient's Preference:   Plan Factors:          Intra-op Monitoring Plan: standard ASA monitors  Intra-op Monitoring Plan Comments:   Post Op Pain Control Plan: per primary service following discharge from PACU  Post Op Pain Control Plan Comments:     Induction:    Beta Blocker:  Patient is on a Beta-Blocker and has received one dose within the past 24 hours (No further documentation required).       Informed Consent: Informed consent signed with the Patient and all parties understand the risks and agree with anesthesia plan.  All questions answered.  Anesthesia consent signed with patient.  ASA Score: 3   Emergent   Day of Surgery Review of History & Physical:        Anesthesia Plan Notes:  MAC poss LMA  Propofol/Ketamine  ELLIE precautions, No versed, limit narcotics  Zofran        Ready For Surgery From Anesthesia Perspective.           Physical Exam  General: Well nourished and Morbid Obesity    Airway:  Mallampati: III   Mouth Opening: Normal  TM Distance: Normal, at least 6 cm  Tongue: Normal  Neck ROM: Normal ROM    Chest/Lungs:  Clear to auscultation, Normal Respiratory Rate    Heart:  Rate: Normal  Rhythm: Regular Rhythm  Sounds: Normal          Anesthesia Plan  Type of Anesthesia, risks & benefits discussed:    Anesthesia Type: MAC  Intra-op Monitoring Plan: standard ASA monitors  Post Op Pain Control Plan: per primary service following discharge from PACU  Informed Consent: Informed consent signed with the Patient and all parties understand the risks and agree with anesthesia plan.  All questions answered.   ASA Score: 3 Emergent  Anesthesia Plan Notes: MAC poss LMA  Propofol/Ketamine  ELLIE precautions, No versed, limit narcotics  Zofran    Ready For Surgery From Anesthesia Perspective.       .

## 2022-10-18 LAB
ANION GAP SERPL CALC-SCNC: 11 MMOL/L (ref 8–16)
BUN SERPL-MCNC: 13 MG/DL (ref 6–20)
C3 SERPL-MCNC: 290 MG/DL (ref 82–167)
C4 SERPL-MCNC: 46 MG/DL (ref 12–38)
CALCIUM SERPL-MCNC: 9.3 MG/DL (ref 8.7–10.5)
CHLORIDE SERPL-SCNC: 96 MMOL/L (ref 95–110)
CO2 SERPL-SCNC: 30 MMOL/L (ref 23–29)
CREAT SERPL-MCNC: 0.8 MG/DL (ref 0.5–1.4)
ERYTHROCYTE [DISTWIDTH] IN BLOOD BY AUTOMATED COUNT: 14.7 % (ref 11.5–14.5)
EST. GFR  (NO RACE VARIABLE): >60 ML/MIN/1.73 M^2
GLUCOSE SERPL-MCNC: 127 MG/DL (ref 70–110)
GLUCOSE SERPL-MCNC: 129 MG/DL (ref 70–110)
GLUCOSE SERPL-MCNC: 138 MG/DL (ref 70–110)
HBV CORE AB SERPL QL IA: NEGATIVE
HBV SURFACE AB SER QL: NON REACTIVE
HBV SURFACE AG SERPL QL IA: NEGATIVE
HCT VFR BLD AUTO: 36.6 % (ref 40–54)
HGB BLD-MCNC: 11.8 G/DL (ref 14–18)
MAGNESIUM SERPL-MCNC: 1.8 MG/DL (ref 1.6–2.6)
MCH RBC QN AUTO: 29.9 PG (ref 27–31)
MCHC RBC AUTO-ENTMCNC: 32.2 G/DL (ref 32–36)
MCV RBC AUTO: 93 FL (ref 82–98)
PHOSPHATE SERPL-MCNC: 3.3 MG/DL (ref 2.7–4.5)
PLATELET # BLD AUTO: 372 K/UL (ref 150–450)
PMV BLD AUTO: 10.4 FL (ref 9.2–12.9)
POTASSIUM SERPL-SCNC: 3 MMOL/L (ref 3.5–5.1)
RBC # BLD AUTO: 3.94 M/UL (ref 4.6–6.2)
SODIUM SERPL-SCNC: 137 MMOL/L (ref 136–145)
WBC # BLD AUTO: 20.31 K/UL (ref 3.9–12.7)

## 2022-10-18 PROCEDURE — 25000003 PHARM REV CODE 250: Performed by: INTERNAL MEDICINE

## 2022-10-18 PROCEDURE — 80048 BASIC METABOLIC PNL TOTAL CA: CPT | Performed by: INTERNAL MEDICINE

## 2022-10-18 PROCEDURE — 99900031 HC PATIENT EDUCATION (STAT)

## 2022-10-18 PROCEDURE — 36415 COLL VENOUS BLD VENIPUNCTURE: CPT | Performed by: INTERNAL MEDICINE

## 2022-10-18 PROCEDURE — 85027 COMPLETE CBC AUTOMATED: CPT | Performed by: INTERNAL MEDICINE

## 2022-10-18 PROCEDURE — 63600175 PHARM REV CODE 636 W HCPCS: Performed by: INTERNAL MEDICINE

## 2022-10-18 PROCEDURE — 25000003 PHARM REV CODE 250: Performed by: SURGERY

## 2022-10-18 PROCEDURE — 94799 UNLISTED PULMONARY SVC/PX: CPT

## 2022-10-18 PROCEDURE — 63600175 PHARM REV CODE 636 W HCPCS: Performed by: SURGERY

## 2022-10-18 PROCEDURE — 99900035 HC TECH TIME PER 15 MIN (STAT)

## 2022-10-18 PROCEDURE — 94640 AIRWAY INHALATION TREATMENT: CPT

## 2022-10-18 PROCEDURE — 25000242 PHARM REV CODE 250 ALT 637 W/ HCPCS: Performed by: INTERNAL MEDICINE

## 2022-10-18 PROCEDURE — 83735 ASSAY OF MAGNESIUM: CPT | Performed by: INTERNAL MEDICINE

## 2022-10-18 PROCEDURE — 94761 N-INVAS EAR/PLS OXIMETRY MLT: CPT

## 2022-10-18 PROCEDURE — 84100 ASSAY OF PHOSPHORUS: CPT | Performed by: INTERNAL MEDICINE

## 2022-10-18 PROCEDURE — 99231 SBSQ HOSP IP/OBS SF/LOW 25: CPT | Mod: ,,, | Performed by: INTERNAL MEDICINE

## 2022-10-18 PROCEDURE — 12000002 HC ACUTE/MED SURGE SEMI-PRIVATE ROOM

## 2022-10-18 PROCEDURE — 99231 PR SUBSEQUENT HOSPITAL CARE,LEVL I: ICD-10-PCS | Mod: ,,, | Performed by: INTERNAL MEDICINE

## 2022-10-18 RX ORDER — VALSARTAN 80 MG/1
320 TABLET ORAL DAILY
Status: DISCONTINUED | OUTPATIENT
Start: 2022-10-18 | End: 2022-10-20 | Stop reason: HOSPADM

## 2022-10-18 RX ORDER — ATENOLOL 50 MG/1
50 TABLET ORAL NIGHTLY
Status: DISCONTINUED | OUTPATIENT
Start: 2022-10-19 | End: 2022-10-20 | Stop reason: HOSPADM

## 2022-10-18 RX ORDER — ATENOLOL 50 MG/1
100 TABLET ORAL DAILY
Status: DISCONTINUED | OUTPATIENT
Start: 2022-10-18 | End: 2022-10-20 | Stop reason: HOSPADM

## 2022-10-18 RX ADMIN — POTASSIUM BICARBONATE 60 MEQ: 391 TABLET, EFFERVESCENT ORAL at 10:10

## 2022-10-18 RX ADMIN — PANTOPRAZOLE SODIUM 40 MG: 40 TABLET, DELAYED RELEASE ORAL at 05:10

## 2022-10-18 RX ADMIN — ATORVASTATIN CALCIUM 40 MG: 40 TABLET, FILM COATED ORAL at 09:10

## 2022-10-18 RX ADMIN — POTASSIUM BICARBONATE 35 MEQ: 391 TABLET, EFFERVESCENT ORAL at 10:10

## 2022-10-18 RX ADMIN — ALBUTEROL SULFATE 2.5 MG: 2.5 SOLUTION RESPIRATORY (INHALATION) at 07:10

## 2022-10-18 RX ADMIN — ATENOLOL 50 MG: 50 TABLET ORAL at 09:10

## 2022-10-18 RX ADMIN — ENOXAPARIN SODIUM 40 MG: 100 INJECTION SUBCUTANEOUS at 09:10

## 2022-10-18 RX ADMIN — HYDROCODONE BITARTRATE AND ACETAMINOPHEN 1 TABLET: 5; 325 TABLET ORAL at 07:10

## 2022-10-18 RX ADMIN — DEXTROSE 2 G: 50 INJECTION, SOLUTION INTRAVENOUS at 09:10

## 2022-10-18 RX ADMIN — DEXTROSE 2 G: 50 INJECTION, SOLUTION INTRAVENOUS at 01:10

## 2022-10-18 RX ADMIN — DEXTROSE 2 G: 50 INJECTION, SOLUTION INTRAVENOUS at 06:10

## 2022-10-18 RX ADMIN — POTASSIUM CHLORIDE 20 MEQ: 1500 TABLET, EXTENDED RELEASE ORAL at 09:10

## 2022-10-18 RX ADMIN — CLOPIDOGREL BISULFATE 75 MG: 75 TABLET, FILM COATED ORAL at 09:10

## 2022-10-18 RX ADMIN — ALBUTEROL SULFATE 2.5 MG: 2.5 SOLUTION RESPIRATORY (INHALATION) at 02:10

## 2022-10-18 RX ADMIN — FERROUS SULFATE TAB 325 MG (65 MG ELEMENTAL FE) 1 EACH: 325 (65 FE) TAB at 09:10

## 2022-10-18 RX ADMIN — Medication 800 MG: at 10:10

## 2022-10-18 NOTE — CARE UPDATE
10/17/22 2000   Patient Assessment/Suction   Level of Consciousness (AVPU) alert   Rhythm/Pattern, Respiratory unlabored   Cough Frequency infrequent   PRE-TX-O2   O2 Device (Oxygen Therapy) room air   SpO2 95 %   Pulse Oximetry Type Intermittent   $ Pulse Oximetry - Multiple Charge Pulse Oximetry - Multiple   Aerosol Therapy   $ Aerosol Therapy Charges Refused   Education   $ Education DME Oxygen;15 min   Respiratory Evaluation   $ Care Plan Tech Time 15 min

## 2022-10-18 NOTE — PROGRESS NOTES
"[facility]  Adult Nutrition   Progress Note (Initial Assessment)     SUMMARY     Recommendations  Recommendation/Intervention: 1) Continue current diet order as tolerated 2) Add Trevon BID for wound healing 3) RD to monitor appetite, intake, tolerance, and plan of care  Goals: PO intake >75% to meet EEN and EPN  Nutrition Goal Status: new    Dietitian Rounds Brief  Screened for length of stay. Pt is a 58 y/o M who presented in the ED with complaints of erythema and swelling to his left lower extremity x 2 days. Spoke with pt at bedside. Pt reports strong appetite and 100% intake. RD to add Trevon BID for wound healing. No c/o N/V/D. RD to monitor appetite, intake, tolerance, and plan of care.   Last BM: 10/16    Diet order:   Current Diet Order: Diabetic 2000kcal     Evaluation of Received Nutrient/Fluid Intake      % Intake of Estimated Energy Needs: 75 - 100 %  % Meal Intake: 75 - 100 %    Intake/Output Summary (Last 24 hours) at 10/18/2022 1315  Last data filed at 10/18/2022 0536  Gross per 24 hour   Intake 120 ml   Output 2625 ml   Net -2505 ml      Anthropometrics  Temp: 98.1 °F (36.7 °C)  Height Method: Stated  Height: 5' 11" (180.3 cm)  Height (inches): 71 in  Weight Method: Bed Scale  Weight: (!) 159.2 kg (350 lb 15.6 oz)  Weight (lb): (!) 350.98 lb  Ideal Body Weight (IBW), Male: 172 lb  % Ideal Body Weight, Male (lb): 204.06 %  BMI (Calculated): 49       Estimated/Assessed Needs  Weight Used For Calorie Calculations: 97.7 kg (215 lb 6.2 oz)  Energy Calorie Requirements (kcal): 1954 - 2443 (20-25 kcal/kg 125% IBW)  Energy Need Method: Kcal/kg  Protein Requirements: 118 - 157 (1.5 - 2.0 g/kg IBW)  Weight Used For Protein Calculations: 78.2 kg (172 lb 6.4 oz)  Fluid Requirements (mL): 2931  Estimated Fluid Requirement Method: other (see comments) (30 mL/kg)  RDA Method (mL): 1954     Reason for Assessment  Reason For Assessment: length of stay  Diagnosis: other (see comments) (Cellulitis of left " lower extremity)    Nutrition/Diet History  Food Allergies: NKFA  Factors Affecting Nutritional Intake: None identified at this time    Nutrition Risk Screen  Nutrition Risk Screen: large or nonhealing wound, burn or pressure injury       Altered Skin Integrity 10/15/22 0216 Left anterior;lower Leg-Wound Image: Images linked  MST Score: 0  Have you recently lost weight without trying?: No  Weight loss score: 0  Have you been eating poorly because of a decreased appetite?: No  Appetite score: 0     Weight History:  Wt Readings from Last 5 Encounters:   10/18/22 (!) 159.2 kg (350 lb 15.6 oz)   05/27/22 (!) 158.3 kg (349 lb)   11/05/21 (!) 156.5 kg (345 lb)   10/27/21 (!) 152 kg (335 lb)   09/22/21 (!) 152 kg (335 lb)      Lab/Procedures/Meds: Pertinent Labs/Meds Reviewed    Medications:Pertinent Medications Reviewed  Scheduled Meds:   albuterol sulfate  2.5 mg Nebulization Q6H WAKE    atenoloL  100 mg Oral Daily    [START ON 10/19/2022] atenoloL  50 mg Oral QHS    atorvastatin  40 mg Oral QHS    ceFAZolin (ANCEF) IVPB  2 g Intravenous Q8H    clopidogreL  75 mg Oral Daily    enoxaparin  40 mg Subcutaneous Q12H    ferrous sulfate  1 tablet Oral Daily    miconazole   Topical (Top) BID    pantoprazole  40 mg Oral Before breakfast    potassium chloride  20 mEq Oral BID    silver sulfADIAZINE 1%   Topical (Top) Daily    valsartan  320 mg Oral Daily     Continuous Infusions:  PRN Meds:.acetaminophen, hydrALAZINE, HYDROcodone-acetaminophen, magnesium oxide, magnesium oxide, melatonin, morphine, nitroGLYCERIN, ondansetron, potassium bicarbonate, potassium bicarbonate, potassium bicarbonate, potassium, sodium phosphates, potassium, sodium phosphates, potassium, sodium phosphates, senna-docusate 8.6-50 mg    Labs: Pertinent Labs Reviewed  Clinical Chemistry:  Recent Labs   Lab 10/17/22  0658 10/18/22  0631    137   K 3.2* 3.0*    96   CO2 25 30*   * 129*   BUN 20 13   CREATININE 0.8 0.8   CALCIUM 9.5 9.3    PROT 7.3  --    ALBUMIN 2.7*  --    BILITOT 0.8  --    ALKPHOS 213*  --    AST 75*  --    ALT 68*  --    ANIONGAP 14 11   MG 2.0 1.8   PHOS  --  3.3     CBC:   Recent Labs   Lab 10/18/22  0631   WBC 20.31*   RBC 3.94*   HGB 11.8*   HCT 36.6*      MCV 93   MCH 29.9   MCHC 32.2     Cardiac Profile:  Recent Labs   Lab 10/16/22  0259   *     Inflammatory Labs:  Recent Labs   Lab 10/17/22  0658   CRP 22.13*     Diabetes:  Recent Labs   Lab 10/15/22  0525   HGBA1C 6.3*     Thyroid & Parathyroid:  Recent Labs   Lab 10/15/22  0525   TSH 2.710     Monitor and Evaluation  Food and Nutrient Intake: energy intake, food and beverage intake  Food and Nutrient Adminstration: diet order  Knowledge/Beliefs/Attitudes: food and nutrition knowledge/skill  Physical Activity and Function: nutrition-related ADLs and IADLs  Anthropometric Measurements: weight, weight change, body mass index  Biochemical Data, Medical Tests and Procedures: electrolyte and renal panel, gastrointestinal profile, glucose/endocrine profile  Nutrition-Focused Physical Findings: overall appearance     Nutrition Risk  Level of Risk/Frequency of Follow-up: moderate     Nutrition Follow-Up  RD Follow-up?: Yes    Lyndon Jovel, Dietetic Intern      Mary Brown RD, LDN 10/18/2022 1:15 PM    I certify that I directed the dietetic intern in service delivery and guided them using my skilled judgment. As the cosigning dietitian, I have reviewed the dietetic interns documentation and am responsible for the treatment, assessment, and plan.

## 2022-10-18 NOTE — PROGRESS NOTES
"[facility]  Progress Note    Patient Name: Be Shipman  MRN: [id number]  Patient Class: IP- Inpatient   Admission Date: 10/14/2022  Length of Stay: 4 days  Attending Physician: Yennifer Bojorquez MD  Primary Care Provider: Werner Patel NP        Subjective:     Principal Problem:Cellulitis of left lower extremity        HPI:  57 year old male with history of HTN, HLD, Morbid Obesity presented to ED complaining of pain, swelling and redness of LLE X 3 days, progressively worsening in swelling and redness. Is 4-8/10 painful (aching), waxing and waning of own accord, but does worsen with usage of limb, with radiations towards groin. Has been taking "A lot" of ibuprofen for the pain. Was hypotensive (90's) coming through the door, which has responded to volume.    In ED: labs reviewed and noted below: leukocytosis with minimal normocytic anemia; hyponatremia, hypokalemia (treated in ED) with stage 4/5 renal dysfunction (no prior history); lactate is normal. UA: mild protein and hematuria; nitrite neg. Cultured in ED. Tib/Fib films reviewed: no bony path. US leg: no DVT. Telemetry reviewed: sinus    Discussed with ED MD: Inpatient admission; culture as above; volume; hold nephrotoxins (ARB, Tenoretic, NSAID's), prn analgesia; hold hypertensives currently using hydralazine per prn orders instead; broad spectrum antibiotics currently with narrowing pending culture results; Nephrology consultation; A1c, TSH with AM labs for review      Overview/Hospital Course:  Seen and examined   Patient reported diarrheal episodes recently and also was taking ibuprofen for his left leg swelling.   Ultrasound legs negative for DVT. BRENDAN worsen .     10/16  Admitted with severe BRENDAN secondary to gastroenteritis and also with left leg severe cellulitis  Today   Patient seen and examined.  His left leg acutely worsened with more poorly and tense and spreading cellulitis above knee  CT ordered and no abscess " and no gas seen .  D/w Dr Rivas and consulted .  Creatinine back to 1.9    Assumed care of this patient on 10/17/22.  Patient with a history of obesity with BMI 49, ELLIE not using CPAP, diabetes mellitus, hypertension, hyperlipidemia, CAD status post PCI, he presented with 2 day history of worsening left lower extremity pain, swelling and redness, denies any preceding trauma or injury.  States he did have nausea, vomiting and diarrhea which he attributes to gastroenteritis from chicken.  He denies any NSAID use, states he was taking antihistamines at home.  On presentation noted to have leukocytosis with WBC 18, acute kidney injury creatinine 4.7, hypokalemia with potassium 2.6, hyponatremia with sodium 129.  Clinical evidence of cellulitis to left lower extremity, ultrasound no DVT, CT with extensive edema, no soft tissue gas, no foreign body, no definite abscess collection.  He was admitted started on vancomycin and cefepime with Infectious Disease consultation.  Nephrology was also consulted for acute kidney injury, ultrasound no evidence of obstruction, nephrotoxic medications were held, IV fluid hydration.  On 10/17 status post I&D OR by , no purulent see noted, serosanguineous fluid, OR cultures submitted, suspect will have bruising/bloody discharge.  He is currently on vancomycin and clindamycin.  Acute kidney injury has resolved.            Interval History:  No interval events.  No new complaints.  Redness with warmth and swelling to the left lower extremity continues, serosanguineous drainage on the dressing, states only tender in foot area with palpation/dressing changes.  Denies any shortness of breath, nausea, vomiting, states has been urinating well.  Afebrile with T-max 98.5°, on room air, blood pressure up trending.  Labs with WBC 20, hemoglobin 11.8, BUN/creatinine 13/0.8.  Documented urine output 2950 cc.  No new culture results.  Discussed with patient.    Review of Systems    Constitutional:  Negative for fever.   Respiratory:  Negative for shortness of breath.    Cardiovascular:  Negative for chest pain.   Gastrointestinal:  Negative for nausea and vomiting.   Genitourinary:  Negative for difficulty urinating.   Skin:  Positive for color change and wound.   Objective:     Vital Signs (Most Recent):  Temp: 98.1 °F (36.7 °C) (10/18/22 0815)  Pulse: 77 (10/18/22 0815)  Resp: 16 (10/18/22 0815)  BP: (!) 153/80 (10/18/22 0815)  SpO2: (!) 93 % (10/18/22 0815)   Vital Signs (24h Range):  Temp:  [97.7 °F (36.5 °C)-98.5 °F (36.9 °C)] 98.1 °F (36.7 °C)  Pulse:  [75-82] 77  Resp:  [16-18] 16  SpO2:  [93 %-98 %] 93 %  BP: (123-169)/(58-98) 153/80     Weight: (!) 159.2 kg (350 lb 15.6 oz)  Body mass index is 48.95 kg/m².    Intake/Output Summary (Last 24 hours) at 10/18/2022 1231  Last data filed at 10/18/2022 0536  Gross per 24 hour   Intake 120 ml   Output 2625 ml   Net -2505 ml      Physical Exam  Vitals and nursing note reviewed.   Constitutional:       Appearance: He is obese.      Comments: Lying in bed, no apparent acute distress, cooperative   HENT:      Head: Normocephalic and atraumatic.      Mouth/Throat:      Mouth: Mucous membranes are moist.   Neck:      Comments: Redundant  Cardiovascular:      Rate and Rhythm: Normal rate and regular rhythm.   Pulmonary:      Effort: No respiratory distress.      Breath sounds: No wheezing.      Comments: On room air  Abdominal:      Palpations: Abdomen is soft.      Tenderness: There is no abdominal tenderness.   Skin:     Comments: Kerlix wrapping to left lower extremity with underlying erythema, serosanguineous discharge   Neurological:      Mental Status: He is alert and oriented to person, place, and time.   Psychiatric:         Mood and Affect: Mood normal.             Significant Labs: BMP:   Recent Labs   Lab 10/18/22  0631   *      K 3.0*   CL 96   CO2 30*   BUN 13   CREATININE 0.8   CALCIUM 9.3   MG 1.8     CBC:   Recent Labs    Lab 10/17/22  0657 10/18/22  0631   WBC 19.97* 20.31*   HGB 11.7* 11.8*   HCT 36.2* 36.6*    372     CMP:   Recent Labs   Lab 10/17/22  0658 10/18/22  0631    137   K 3.2* 3.0*    96   CO2 25 30*   * 129*   BUN 20 13   CREATININE 0.8 0.8   CALCIUM 9.5 9.3   PROT 7.3  --    ALBUMIN 2.7*  --    BILITOT 0.8  --    ALKPHOS 213*  --    AST 75*  --    ALT 68*  --    ANIONGAP 14 11     Cardiac Markers: No results for input(s): CKMB, MYOGLOBIN, BNP, TROPISTAT in the last 48 hours.  Lactic Acid: No results for input(s): LACTATE in the last 48 hours.  POCT Glucose: No results for input(s): POCTGLUCOSE in the last 48 hours.    Significant Imaging: I have reviewed all pertinent imaging results/findings within the past 24 hours.      Assessment/Plan:      Active Hospital Problems    Diagnosis    *Cellulitis of left lower extremity    Leucocytosis    Severe obesity (BMI >= 40)    Type 2 diabetes mellitus, without long-term current use of insulin, HBA1C 6.3%    Hypokalemia    ELLIE (obstructive sleep apnea) cannot tolerate cpap    S/P coronary artery stent placement - LAD 08/09/2019    Hyperlipidemia, mixed    Essential hypertension, benign    Alcohol abuse, daily use        Plan:  Continue care on medical floor   On 10/17 status post OR I&D, no purulence, now new culture results  On 10/17 antibiotics de-escalated to Ancef  Elevation left lower extremity   Continue p.r.n. pain control as ordered  Wound care consult, daily dressing to left lower extremity   Resume home atenolol, change Benicar to the valsartan (on formulary), continue to hold chlorthalidone, amlodipine and Lasix, monitor and adjust as needed  20 mEq b.i.d. standing dose oral potassium supplementation and monitor  Topical anti fungal between the toes  ELLIE, not using CPAP, continue counseling  Needs lifestyle modification for weight loss   Previous echocardiogram with EF of 58%   Increase activity, out of bed to chair, mobilize    A.m. labs ordered  Appreciate all consultant's input   Further plan as per clinical course     VTE Risk Mitigation (From admission, onward)         Ordered     enoxaparin injection 40 mg  Every 12 hours         10/17/22 0921     IP VTE HIGH RISK PATIENT  Once         10/15/22 0109     Place sequential compression device  Until discontinued         10/15/22 0109                Discharge Planning   QUINTIN:      Code Status: Full Code   Is the patient medically ready for discharge?:     Reason for patient still in hospital (select all that apply): Patient trending condition  Discharge Plan A: Home                  Yennifer Bojorquez MD  Department of Hospital Medicine   [facility]

## 2022-10-18 NOTE — SUBJECTIVE & OBJECTIVE
Interval History:  No interval events.  No new complaints.  Redness with warmth and swelling to the left lower extremity continues, serosanguineous drainage on the dressing, states only tender in foot area with palpation/dressing changes.  Denies any shortness of breath, nausea, vomiting, states has been urinating well.  Afebrile with T-max 98.5°, on room air, blood pressure up trending.  Labs with WBC 20, hemoglobin 11.8, BUN/creatinine 13/0.8.  Documented urine output 2950 cc.  No new culture results.  Discussed with patient.    Review of Systems   Constitutional:  Negative for fever.   Respiratory:  Negative for shortness of breath.    Cardiovascular:  Negative for chest pain.   Gastrointestinal:  Negative for nausea and vomiting.   Genitourinary:  Negative for difficulty urinating.   Skin:  Positive for color change and wound.   Objective:     Vital Signs (Most Recent):  Temp: 98.1 °F (36.7 °C) (10/18/22 0815)  Pulse: 77 (10/18/22 0815)  Resp: 16 (10/18/22 0815)  BP: (!) 153/80 (10/18/22 0815)  SpO2: (!) 93 % (10/18/22 0815)   Vital Signs (24h Range):  Temp:  [97.7 °F (36.5 °C)-98.5 °F (36.9 °C)] 98.1 °F (36.7 °C)  Pulse:  [75-82] 77  Resp:  [16-18] 16  SpO2:  [93 %-98 %] 93 %  BP: (123-169)/(58-98) 153/80     Weight: (!) 159.2 kg (350 lb 15.6 oz)  Body mass index is 48.95 kg/m².    Intake/Output Summary (Last 24 hours) at 10/18/2022 1231  Last data filed at 10/18/2022 0536  Gross per 24 hour   Intake 120 ml   Output 2625 ml   Net -2505 ml      Physical Exam  Vitals and nursing note reviewed.   Constitutional:       Appearance: He is obese.      Comments: Lying in bed, no apparent acute distress, cooperative   HENT:      Head: Normocephalic and atraumatic.      Mouth/Throat:      Mouth: Mucous membranes are moist.   Neck:      Comments: Redundant  Cardiovascular:      Rate and Rhythm: Normal rate and regular rhythm.   Pulmonary:      Effort: No respiratory distress.      Breath sounds: No wheezing.       Comments: On room air  Abdominal:      Palpations: Abdomen is soft.      Tenderness: There is no abdominal tenderness.   Skin:     Comments: Kerlix wrapping to left lower extremity with underlying erythema, serosanguineous discharge   Neurological:      Mental Status: He is alert and oriented to person, place, and time.   Psychiatric:         Mood and Affect: Mood normal.             Significant Labs: BMP:   Recent Labs   Lab 10/18/22  0631   *      K 3.0*   CL 96   CO2 30*   BUN 13   CREATININE 0.8   CALCIUM 9.3   MG 1.8     CBC:   Recent Labs   Lab 10/17/22  0657 10/18/22  0631   WBC 19.97* 20.31*   HGB 11.7* 11.8*   HCT 36.2* 36.6*    372     CMP:   Recent Labs   Lab 10/17/22  0658 10/18/22  0631    137   K 3.2* 3.0*    96   CO2 25 30*   * 129*   BUN 20 13   CREATININE 0.8 0.8   CALCIUM 9.5 9.3   PROT 7.3  --    ALBUMIN 2.7*  --    BILITOT 0.8  --    ALKPHOS 213*  --    AST 75*  --    ALT 68*  --    ANIONGAP 14 11     Cardiac Markers: No results for input(s): CKMB, MYOGLOBIN, BNP, TROPISTAT in the last 48 hours.  Lactic Acid: No results for input(s): LACTATE in the last 48 hours.  POCT Glucose: No results for input(s): POCTGLUCOSE in the last 48 hours.    Significant Imaging: I have reviewed all pertinent imaging results/findings within the past 24 hours.

## 2022-10-18 NOTE — PLAN OF CARE
Problem: Skin Injury Risk Increased  Goal: Skin Health and Integrity  Outcome: Ongoing, Progressing  Intervention: Promote and Optimize Oral Intake  Flowsheets (Taken 10/18/2022 1414)  Oral Nutrition Promotion: calorie-dense liquids provided

## 2022-10-18 NOTE — PROGRESS NOTES
"INPATIENT NEPHROLOGY PROGRESS  [facility] NEPHROLOGY    Be Shipman  10/18/2022    Reason for consultation:    Acute kidney injury    Chief Complaint:   Chief Complaint   Patient presents with    Leg Swelling     LLE swelling and weeping, redness x2 days ago.      History of Present Illness:    Per H and P    57 year old male with history of HTN, HLD, Morbid Obesity presented to ED complaining of pain, swelling and redness of LLE X 3 days, progressively worsening in swelling and redness. Is 4-8/10 painful (aching), waxing and waning of own accord, but does worsen with usage of limb, with radiations towards groin. Has been taking "A lot" of ibuprofen for the pain. Was hypotensive (90's) coming through the door, which has responded to volume.    10/15  consulted for BRENDAN.  Pt had leg pain.  He stated that he had food poisoning starting on Monday and had severe nausea and vomiting and diarrhea all week.  He still has diarrhea.  No nausea.  Appetite is intact.  No confusion.  He denies NSAID use.  He was only taking tylenol for pain  10/16  VSS, renal function improving.  Hypokalemic, got IV repletion.  Feels better, no n/v/d.  10/17 VSS, no new complains.  10/18 VSS, no new complains. S/p left LE I&D yesterday    Plan of Care:    BRENDAN due to hemodynamic compromise from volume depletion and hypotension, infection causing mild ATN  CKD stage 2  --renal us/bladder us is OK  --rheumatologic serologies given proteinuria and microhematuria, negative  --pt had normal sCr in July, he doesn't have stage 5 chronic kidney disease    Hypokalemia  --continue oral KCL and KCL with IVF  --non-renal diet when able to eat  --Mg is OK  --daily labs    Anemia  --iron panel and paraproteins normal  --monitor.    Cellulitis of LLE  --monitor for vanco toxicity  --appreciate ID and Surgery input  --I&D on 10/17, no purulence    HTN  -- resume BP meds as needed    Thank you for allowing us to participate in this patient's care. We will " continue to follow.    Vital Signs:  Temp Readings from Last 3 Encounters:   10/18/22 98.1 °F (36.7 °C)   05/27/22 97.7 °F (36.5 °C)   11/05/21 97.9 °F (36.6 °C)       Pulse Readings from Last 3 Encounters:   10/18/22 77   05/27/22 77   11/05/21 99       BP Readings from Last 3 Encounters:   10/18/22 (!) 153/80   05/27/22 136/84   11/05/21 (!) 136/92       Weight:  Wt Readings from Last 3 Encounters:   10/15/22 (!) 159.2 kg (350 lb 15.6 oz)   05/27/22 (!) 158.3 kg (349 lb)   11/05/21 (!) 156.5 kg (345 lb)       Past Medical & Surgical History:  Past Medical History:   Diagnosis Date    Anticoagulant long-term use     Coronary artery disease     Diabetes mellitus     Hyperlipidemia     Hypertension     Morbid obesity        Past Surgical History:   Procedure Laterality Date    CORONARY STENT PLACEMENT  08/09/2019    pt has card in [facility] Dr. Grimm    HAND SURGERY Left     22 y/o, laceration repair    HERNIA REPAIR      umbilical hernia    INCISION AND DRAINAGE FOOT Right 09/13/2021    Procedure: INCISION AND DRAINAGE, FOOT;  Surgeon: Juan Jose Julio DPM;  Location: [facility] OR;  Service: Podiatry;  Laterality: Right;    left breast tumor removed (benign)       LEFT HEART CATHETERIZATION Left 08/09/2019    Procedure: Left heart cath;  Surgeon: Erik Jessica MD;  Location: [facility] CATH;  Service: Cardiology;  Laterality: Left;       Past Social History:  Social History     Socioeconomic History    Marital status:    Tobacco Use    Smoking status: Former    Smokeless tobacco: Former     Quit date: 7/22/2000    Tobacco comments:     quit 12 years ago   Substance and Sexual Activity    Alcohol use: Yes     Comment: every day 1/2 a fifth of whiskey per day    Drug use: No    Sexual activity: Yes     Social Determinants of Health     Financial Resource Strain: Low Risk     Difficulty of Paying Living Expenses: Not very hard   Food Insecurity: No Food Insecurity    Worried About Running Out of Food in the  Last Year: Never true    Ran Out of Food in the Last Year: Never true   Transportation Needs: No Transportation Needs    Lack of Transportation (Medical): No    Lack of Transportation (Non-Medical): No   Physical Activity: Sufficiently Active    Days of Exercise per Week: 5 days    Minutes of Exercise per Session: 30 min   Stress: No Stress Concern Present    Feeling of Stress : Not at all   Social Connections: Moderately Isolated    Frequency of Communication with Friends and Family: More than three times a week    Frequency of Social Gatherings with Friends and Family: More than three times a week    Attends [employer] Services: Never    Active Member of Clubs or Organizations: No    Attends Club or Organization Meetings: Never    Marital Status:    Housing Stability: Low Risk     Unable to Pay for Housing in the Last Year: No    Number of Places Lived in the Last Year: 1    Unstable Housing in the Last Year: No       Medications:  No current facility-administered medications on file prior to encounter.     Current Outpatient Medications on File Prior to Encounter   Medication Sig Dispense Refill    allopurinoL (ZYLOPRIM) 300 MG tablet TAKE 1 TABLET BY MOUTH EVERY DAY (Patient taking differently: Take 300 mg by mouth once daily.) 90 tablet 0    amLODIPine (NORVASC) 10 MG tablet Take 1 tablet (10 mg total) by mouth once daily. 90 tablet 3    atenoloL (TENORMIN) 50 MG tablet Take 50 mg by mouth every evening.      atenoloL-chlorthalidone (TENORETIC) 100-25 mg per tablet Take 1 tablet by mouth once daily.      atorvastatin (LIPITOR) 40 MG tablet TAKE 1 TABLET BY MOUTH EVERY DAY (Patient taking differently: Take 40 mg by mouth once daily.) 90 tablet 0    cloNIDine (CATAPRES) 0.1 MG tablet Take 1 tablet (0.1 mg total) by mouth 2 (two) times daily. 180 tablet 0    clopidogreL (PLAVIX) 75 mg tablet TAKE 1 TABLET BY MOUTH EVERY DAY (Patient taking differently: Take 75 mg by mouth once daily.) 90 tablet 3     "esomeprazole (NEXIUM) 40 MG capsule Take 40 mg by mouth before breakfast.      furosemide (LASIX) 20 MG tablet Take 1 tablet (20 mg total) by mouth once daily. 90 tablet 0    olmesartan (BENICAR) 40 MG tablet Take 1 tablet (40 mg total) by mouth once daily. 90 tablet 3    potassium chloride SA (K-DUR,KLOR-CON) 20 MEQ tablet TAKE 1 TABLET BY MOUTH EVERY DAY (Patient taking differently: Take 20 mEq by mouth once daily.) 90 tablet 0    metFORMIN (GLUCOPHAGE-XR) 500 MG ER 24hr tablet TAKE 1 TABLET BY MOUTH EVERY DAY WITH BREAKFAST (Patient taking differently: Take 500 mg by mouth daily with breakfast.) 90 tablet 0    nitroGLYCERIN (NITROSTAT) 0.4 MG SL tablet Place 1 tablet (0.4 mg total) under the tongue every 5 (five) minutes as needed for Chest pain. 25 tablet 2     Scheduled Meds:   albuterol sulfate  2.5 mg Nebulization Q6H WAKE    atenoloL  50 mg Oral Daily    atorvastatin  40 mg Oral QHS    ceFAZolin (ANCEF) IVPB  2 g Intravenous Q8H    clopidogreL  75 mg Oral Daily    enoxaparin  40 mg Subcutaneous Q12H    ferrous sulfate  1 tablet Oral Daily    miconazole   Topical (Top) BID    pantoprazole  40 mg Oral Before breakfast    potassium chloride  20 mEq Oral BID    silver sulfADIAZINE 1%   Topical (Top) Daily     Continuous Infusions:      PRN Meds:.acetaminophen, hydrALAZINE, HYDROcodone-acetaminophen, magnesium oxide, magnesium oxide, melatonin, morphine, nitroGLYCERIN, ondansetron, potassium bicarbonate, potassium bicarbonate, potassium bicarbonate, potassium, sodium phosphates, potassium, sodium phosphates, potassium, sodium phosphates, senna-docusate 8.6-50 mg    Allergies:  Tetanus vaccines and toxoid    Past Family History:  Reviewed; refer to Hospitalist Admission Note    Review of Systems:  Review of Systems - All 14 systems reviewed and negative, except as noted in HPI    Physical Exam:    BP (!) 153/80   Pulse 77   Temp 98.1 °F (36.7 °C)   Resp 16   Ht 5' 11" (1.803 m)   Wt (!) 159.2 kg (350 lb 15.6 " oz)   SpO2 (!) 93%   BMI 48.95 kg/m²     General Appearance:    Alert, cooperative, no distress, appears stated age   Head:    Normocephalic, without obvious abnormality, atraumatic   Eyes:    PER, conjunctiva/corneas clear, EOM's intact in both eyes        Throat:   Lips, mucosa, and tongue normal; teeth and gums normal   Back:     Symmetric, no curvature, ROM normal, no CVA tenderness   Lungs:     Clear to auscultation bilaterally, respirations unlabored   Chest wall:    No tenderness or deformity   Heart:    Regular rate and rhythm, S1 and S2 normal, no murmur, rub   or gallop   Abdomen:     Soft, non-tender, bowel sounds active all four quadrants,     no masses, no organomegaly   Extremities:   Left leg swollen and red   Pulses:   2+ and symmetric all extremities   MSK:   No joint or muscle swelling, tenderness or deformity   Skin:   Skin color, texture, turgor normal, no rashes or lesions   Neurologic:   CNII-XII intact, normal strength and sensation       Throughout.  No flap     Results:  Recent Labs   Lab 10/16/22  0259 10/17/22  0658 10/18/22  0631    141 137   K 2.6* 3.2* 3.0*   CL 98 102 96   CO2 27 25 30*   BUN 54* 20 13   CREATININE 1.9* 0.8 0.8   * 142* 129*         Recent Labs   Lab 10/14/22  1615 10/15/22  0525 10/16/22  0259 10/17/22  0658 10/18/22  0631   CALCIUM 8.4*   < > 8.6* 9.5 9.3   ALBUMIN 3.1*  --  2.6* 2.7*  --    PHOS  --   --   --   --  3.3   MG  --   --  1.9 2.0 1.8    < > = values in this interval not displayed.               No results for input(s): POCTGLUCOSE in the last 168 hours.    Recent Labs   Lab 11/05/21  0758 07/07/22  0758 10/15/22  0525   Hemoglobin A1C 6.0 6.7 H 6.3 H         Recent Labs   Lab 10/16/22  0259 10/17/22  0657 10/18/22  0631   WBC 17.32*  17.32* 19.97* 20.31*   HGB 10.8*  10.8* 11.7* 11.8*   HCT 32.8*  32.8* 36.2* 36.6*     234 325 372   MCV 91  91 90 93   MCHC 32.9  32.9 32.3 32.2   MONO 4.0  4.0 6.0  CANCELED  --           Recent Labs   Lab 10/14/22  1615 10/16/22  0259 10/17/22  0658   BILITOT 1.4* 1.1* 0.8   PROT 7.7 6.7 7.3   ALBUMIN 3.1* 2.6* 2.7*   ALKPHOS 124 196* 213*   ALT 43 62* 68*   AST 86* 97* 75*         Recent Labs   Lab 04/10/21  0738 09/11/21  1612 10/14/22  1649   Color, UA Yellow Yellow Yellow   Appearance, UA  --  Clear Hazy A   Clarity, UA Clear  --   --    pH, UA 6.0 7.0 5.0   Specific [place], UA 1.021 1.015 1.020   Protein, UA Negative Negative 1+ A   Glucose, UA  --  Negative Negative   Ketones, UA Negative Negative Trace A   Urobilinogen, UA 0.2 Negative 2.0-3.0 A   Bilirubin (UA)  --  Negative 1+ A   Occult Blood UA Negative Negative 1+ A   Nitrite, UA Negative Negative Negative   RBC, UA  --  1 7 H   WBC, UA  --  4 32 H   Bacteria  --  Negative Negative   Hyaline Casts, UA  --  1 47 A               Microbiology Results (last 7 days)       Procedure Component Value Units Date/Time    Aerobic culture [[id number]] Collected: 10/17/22 0920    Order Status: Completed Specimen: Wound from Leg, Left Updated: 10/18/22 0659     Aerobic Bacterial Culture No growth    Narrative:      Leg, Left Lower Extremity    Aerobic culture [[id number]] Collected: 10/16/22 1800    Order Status: Completed Specimen: Skin from Leg, Left Updated: 10/18/22 0658     Aerobic Bacterial Culture No growth    Narrative:      bullae    Blood culture #1 **CANNOT BE ORDERED STAT** [[id number]] Collected: 10/14/22 1615    Order Status: Completed Specimen: Blood from Peripheral, Forearm, Left Updated: 10/17/22 1832     Blood Culture, Routine No Growth to date      No Growth to date      No Growth to date      No Growth to date    Blood culture #2 **CANNOT BE ORDERED STAT** [[id number]] Collected: 10/14/22 1643    Order Status: Completed Specimen: Blood from Peripheral, Antecubital, Right Updated: 10/17/22 1832     Blood Culture, Routine No Growth to date      No Growth to date      No Growth to date      No Growth to date    Gram stain  [[id number]] Collected: 10/17/22 0920    Order Status: Completed Specimen: Wound from Leg, Left Updated: 10/17/22 1405     Gram Stain Result No WBC's      No organisms seen    Narrative:      Leg, Left Lower Extremity    Fungus culture [[id number]] Collected: 10/17/22 0920    Order Status: Sent Specimen: Wound from Leg, Left Updated: 10/17/22 0956    AFB Culture & Smear [[id number]] Collected: 10/17/22 0920    Order Status: Sent Specimen: Wound from Leg, Left Updated: 10/17/22 0956    Culture, Anaerobic [[id number]] Collected: 10/17/22 0920    Order Status: Sent Specimen: Wound from Leg, Left Updated: 10/17/22 0956    Culture, Anaerobic [[id number]] Collected: 10/16/22 1800    Order Status: Sent Specimen: Skin from Leg, Left Updated: 10/16/22 2006    Urine culture [[id number]] Collected: 10/14/22 1649    Order Status: Completed Specimen: Urine Updated: 10/16/22 0635     Urine Culture, Routine Multiple organisms isolated. None in predominance.  Repeat if      clinically necessary.    Narrative:      Specimen Source->Urine             Patient care was time spent personally by me on the following activities:   Obtaining a history  Examination of patient.  Providing medical care at the patients bedside.  Developing a treatment plan with patient or surrogate and bedside caregivers  Ordering and reviewing laboratory studies, radiographic studies, pulse oximetry.  Ordering and performing treatments and interventions.  Evaluation of patient's response to treatment.  Discussions with consultants while on the unit and immediately available to the patient.  Re-evaluation of the patient's condition.  Documentation in the medical record.     Prince Good MD  Nephrology  [facility]  [phone]

## 2022-10-18 NOTE — PLAN OF CARE
"CM attempted to reach Deana with Danay, at [phone] to return call. CM received a message stating "unable to accept calll" but no voicemail was available. CM will attempt to reach Fifi again today.  "

## 2022-10-18 NOTE — PLAN OF CARE
Patient is not medically clear for discharge. CM discussed discharge plan with patient. Patient will need home health for wound care. PT ordered to eval for recommendation if PT home health is needed. Patient provided with demographic information for patient choice. Patient has no preference. CM sent referral to [facility] home health via [facility].       10/18/22 2727   Discharge Reassessment   Assessment Type Discharge Planning Reassessment   Did the patient's condition or plan change since previous assessment? Yes   Discharge Plan discussed with: Patient   Name(s) and Number(s) Marcela   Discharge Plan A Home Health   Discharge Plan B Home Health   DME Needed Upon Discharge  other (see comments)  (TBD)   Discharge Barriers Identified None   Why the patient remains in the hospital Requires continued medical care   Post-Acute Status   Post-Acute Authorization Home Health   Home Health Status Referrals Sent   Patient choice form signed by patient/caregiver List with quality metrics by geographic area provided

## 2022-10-18 NOTE — CARE UPDATE
10/18/22 0729   Patient Assessment/Suction   Level of Consciousness (AVPU) alert   Respiratory Effort Normal   Expansion/Accessory Muscles/Retractions no use of accessory muscles   All Lung Fields Breath Sounds diminished   Rhythm/Pattern, Respiratory unlabored   Cough Frequency infrequent   Cough Type dry   PRE-TX-O2   O2 Device (Oxygen Therapy) room air   SpO2 96 %   Pulse Oximetry Type Intermittent   $ Pulse Oximetry - Multiple Charge Pulse Oximetry - Multiple   Pulse 75   Resp 17   Aerosol Therapy   $ Aerosol Therapy Charges Aerosol Treatment   Daily Review of Necessity (SVN) completed   Respiratory Treatment Status (SVN) given   Treatment Route (SVN) mask;oxygen   Patient Position (SVN) HOB elevated   Post Treatment Assessment (SVN) breath sounds unchanged   Signs of Intolerance (SVN) none   Education   $ Education Bronchodilator;15 min   Respiratory Evaluation   $ Care Plan Tech Time 15 min   $ Eval/Re-eval Charges Evaluation

## 2022-10-18 NOTE — PROGRESS NOTES
"Consult Note  Infectious Disease    Reason for Consult:  cellulitis    HPI: Be Shipman is a 57 y.o. male with past medical history of obesity, BMI 48, HTN, DLP, diet-controlled diabetes, hemoglobin A1c 6.7--6.3.  came to ED complaining of left lower extremity swelling redness and pain x3 days duration, progressively worse.  This happened to 3 days after he had some food poisoning after eating some chicken that set out for a day.      In ED, he was found hypotensive and responded to fluids.  Left leg X-ray ruled out foreign body; ultrasound ruled out DVT.  He was admitted and started on cefepime on vancomycin for cellulitis, today is day 3 of treatment.  Blood cultures are negative to date.  WBC is not changing much 18--19--17.  Nephrologist is following for BRENDAN.  Creatinine is improved 4.7--5.4--1.9.      I came and saw patient.  I cleaned his left leg.  Collected cultures from one of the blisters on the medial part of the left leg.  Patient is allergic to tetanus and of course is not up-to-date.    10/17: ([place]) d/w Dr. Carranza. S/p limited exploration of soft tissue with no findings of purulence. The nausea and vomiting preceded the development of cellulitis and may be due to the strep infection rather than the food poisoning that he presumed he had. He is wheezing and does indicate that he has some "phlegm".   10/18: Interim reviewed.  Afebrile, cultures from left leg bulla taken by Dr. Carranza and in surgery are both negative.  White blood cells are about the same.  Creatinine remains in the normal range. Lots of blistering along lower leg, c/w strep cellulitis. Advised he will desquamate all of these areas.     Antibiotics (From admission, onward)      Start     Stop Route Frequency Ordered    10/17/22 1800  cefazolin (ANCEF) 2 gram in dextrose 5% 50 mL IVPB (premix)         -- IV Every 8 hours (non-standard times) 10/17/22 1651    10/17/22 0900  silver sulfADIAZINE 1% cream         -- Top Daily 10/16/22 " 2117          Antifungals (From admission, onward)      Start     Stop Route Frequency Ordered    10/17/22 2100  miconazole 2 % cream         -- Top 2 times daily 10/17/22 1842             EXAM & DIAGNOSTICS REVIEWED:   Vitals:     Temp:  [97.7 °F (36.5 °C)-98.5 °F (36.9 °C)]   Temp: 98.1 °F (36.7 °C) (10/18/22 0815)  Pulse: 77 (10/18/22 0815)  Resp: 16 (10/18/22 0815)  BP: (!) 153/80 (10/18/22 0815)  SpO2: (!) 93 % (10/18/22 0815)    Intake/Output Summary (Last 24 hours) at 10/18/2022 1034  Last data filed at 10/18/2022 0536  Gross per 24 hour   Intake 120 ml   Output 2950 ml   Net -2830 ml       General:  In NAD. Alert and attentive, cooperative, comfortable  Eyes:  Anicteric, , EOMI  ENT:  No ulcers, exudates, thrush, nares patent, dentition is fair  Neck:  supple,    Lungs:    Heart:  RRR,    Abd:   obese,    ND,   :  Voids,   Musc:  Joints without effusion, swelling, erythema, synovitis, muscle wasting.   Skin:  See picture . Tinea minimal.     Neuro:             Alert, attentive, speech fluent, face symmetric, moves all extremities, no focal weakness. Ambulatory  Psych:  Calm, cooperative  Lymphatic:       Extrem: Left leg is swollen, red, tender to minimal movement.  No lymph node enlargement.    Some tinea pedis present See pictures of left leg, otherwise No edema, erythema, phlebitis, cellulitis, warm and well perfused  VAD:       Isolation:    Wound:             10/18: today the intensive of the redness is improved. There is still extensive cellulitis, extending laterally to left thigh. There is extensive blistering and weeping and macerated skin which will all desquamate.         General Labs reviewed:  Recent Labs   Lab 10/16/22  0259 10/17/22  0657 10/18/22  0631   WBC 17.32*  17.32* 19.97* 20.31*   HGB 10.8*  10.8* 11.7* 11.8*   HCT 32.8*  32.8* 36.2* 36.6*     234 325 372       Recent Labs   Lab 10/14/22  1615 10/15/22  0525 10/16/22  0259 10/17/22  0658 10/18/22  0631   *   < > 136  141 137   K 2.6*   < > 2.6* 3.2* 3.0*   CL 91*   < > 98 102 96   CO2 25   < > 27 25 30*   BUN 55*   < > 54* 20 13   CREATININE 4.7*   < > 1.9* 0.8 0.8   CALCIUM 8.4*   < > 8.6* 9.5 9.3   PROT 7.7  --  6.7 7.3  --    BILITOT 1.4*  --  1.1* 0.8  --    ALKPHOS 124  --  196* 213*  --    ALT 43  --  62* 68*  --    AST 86*  --  97* 75*  --     < > = values in this interval not displayed.     Recent Labs   Lab 10/17/22  0658   CRP 22.13*         Micro:  Microbiology Results (last 7 days)       Procedure Component Value Units Date/Time    Aerobic culture [[id number]] Collected: 10/17/22 0920    Order Status: Completed Specimen: Wound from Leg, Left Updated: 10/18/22 0659     Aerobic Bacterial Culture No growth    Narrative:      Leg, Left Lower Extremity    Aerobic culture [[id number]] Collected: 10/16/22 1800    Order Status: Completed Specimen: Skin from Leg, Left Updated: 10/18/22 0658     Aerobic Bacterial Culture No growth    Narrative:      bullae    Blood culture #1 **CANNOT BE ORDERED STAT** [[id number]] Collected: 10/14/22 1615    Order Status: Completed Specimen: Blood from Peripheral, Forearm, Left Updated: 10/17/22 1832     Blood Culture, Routine No Growth to date      No Growth to date      No Growth to date      No Growth to date    Blood culture #2 **CANNOT BE ORDERED STAT** [[id number]] Collected: 10/14/22 1643    Order Status: Completed Specimen: Blood from Peripheral, Antecubital, Right Updated: 10/17/22 1832     Blood Culture, Routine No Growth to date      No Growth to date      No Growth to date      No Growth to date    Gram stain [[id number]] Collected: 10/17/22 0920    Order Status: Completed Specimen: Wound from Leg, Left Updated: 10/17/22 1405     Gram Stain Result No WBC's      No organisms seen    Narrative:      Leg, Left Lower Extremity    Fungus culture [[id number]] Collected: 10/17/22 0920    Order Status: Sent Specimen: Wound from Leg, Left Updated: 10/17/22 0956    AFB Culture & Smear [[id number]]  "Collected: 10/17/22 0920    Order Status: Sent Specimen: Wound from Leg, Left Updated: 10/17/22 0956    Culture, Anaerobic [[id number]] Collected: 10/17/22 0920    Order Status: Sent Specimen: Wound from Leg, Left Updated: 10/17/22 0956    Culture, Anaerobic [[id number]] Collected: 10/16/22 1800    Order Status: Sent Specimen: Skin from Leg, Left Updated: 10/16/22 2006    Urine culture [[id number]] Collected: 10/14/22 1649    Order Status: Completed Specimen: Urine Updated: 10/16/22 0635     Urine Culture, Routine Multiple organisms isolated. None in predominance.  Repeat if      clinically necessary.    Narrative:      Specimen Source->Urine            Imaging Reviewed:  X-ray leg  Diffuse visualized left lower extremity subcutaneous edema suggested without acute bony abnormality.     Ultrasound of left leg negative for DVT     CT lower extremity  Severe extensive subcutaneous edema of the left lower extremity.   No acute osseous abnormality.   Negative for soft tissue gas.   Dermal lesion along the posterior medial aspect of the calf. Please correlate with physical exam.     Cardiology:  Reviewed EC CYRIL in chart.  EF 58%.  PASP 27.  Mild mitral regurg, mild tricuspid regurg,    IMPRESSION & PLAN     Severe Left lower extremity cellulitis, probably due to Streptococcus  S/p I and D without findings of purulence  Tinea pedis, local treatment   Pyuria on admission, WBC of 32; no complaints  Recent "food poisoning"  BRENDAN, dehydration, resolved  Probable diabetes, needs to be on diabetic diet.  Patient denies having diabetes to me.    Past medical history of obesity, BMI 48, HTN, D LP    Recommendations:    ancef alone  Topical antifungal  albuterol     Elevate leg when not walking  Encouraged to get out of bed  Tight glucose control.    Follow cultures      Medical Decision Making during this encounter was  [_] Low Complexity  [_] Moderate Complexity  [ xx ] High Complexity     "

## 2022-10-19 LAB
ALBUMIN MFR UR ELPH: 65.4 %
ALBUMIN SERPL ELPH-MCNC: 2.1 G/DL (ref 2.9–4.4)
ALBUMIN/GLOB SERPL: 0.6 {RATIO} (ref 0.7–1.7)
ALPHA-2 GLOBULIN URINE RANDOM (ELEC): 0 %
ALPHA1 GLOB MFR UR ELPH: 0 %
ALPHA1 GLOB SERPL ELPH-MCNC: 0.6 G/DL (ref 0–0.4)
ALPHA2 GLOB SERPL ELPH-MCNC: 1.3 G/DL (ref 0.4–1)
B-GLOBULIN MFR UR ELPH: 0 %
B-GLOBULIN SERPL ELPH-MCNC: 1 G/DL (ref 0.7–1.3)
BACTERIA BLD CULT: NORMAL
BACTERIA BLD CULT: NORMAL
BACTERIA SPEC ANAEROBE CULT: NORMAL
BACTERIA SPEC ANAEROBE CULT: NORMAL
GAMMA GLOB MFR UR ELPH: 0 %
GAMMA GLOB SERPL ELPH-MCNC: 0.5 G/DL (ref 0.4–1.8)
GLOBULIN SER CALC-MCNC: 3.4 G/DL (ref 2.2–3.9)
LABORATORY COMMENT REPORT: ABNORMAL
LABORATORY COMMENT REPORT: NORMAL
M PROTEIN MFR UR ELPH: NORMAL %
M PROTEIN SERPL ELPH-MCNC: ABNORMAL G/DL
PROT SERPL-MCNC: 5.5 G/DL (ref 6–8.5)
PROT UR-MCNC: 36.1 MG/DL

## 2022-10-19 PROCEDURE — 25000003 PHARM REV CODE 250: Performed by: SURGERY

## 2022-10-19 PROCEDURE — 63600175 PHARM REV CODE 636 W HCPCS: Performed by: SURGERY

## 2022-10-19 PROCEDURE — 94761 N-INVAS EAR/PLS OXIMETRY MLT: CPT

## 2022-10-19 PROCEDURE — 94799 UNLISTED PULMONARY SVC/PX: CPT

## 2022-10-19 PROCEDURE — 25000003 PHARM REV CODE 250: Performed by: INTERNAL MEDICINE

## 2022-10-19 PROCEDURE — 12000002 HC ACUTE/MED SURGE SEMI-PRIVATE ROOM

## 2022-10-19 PROCEDURE — 99232 PR SUBSEQUENT HOSPITAL CARE,LEVL II: ICD-10-PCS | Mod: ,,, | Performed by: INTERNAL MEDICINE

## 2022-10-19 PROCEDURE — 99900035 HC TECH TIME PER 15 MIN (STAT)

## 2022-10-19 PROCEDURE — 63600175 PHARM REV CODE 636 W HCPCS: Performed by: INTERNAL MEDICINE

## 2022-10-19 PROCEDURE — 99900031 HC PATIENT EDUCATION (STAT)

## 2022-10-19 PROCEDURE — 25000242 PHARM REV CODE 250 ALT 637 W/ HCPCS: Performed by: INTERNAL MEDICINE

## 2022-10-19 PROCEDURE — 97161 PT EVAL LOW COMPLEX 20 MIN: CPT

## 2022-10-19 PROCEDURE — 99232 SBSQ HOSP IP/OBS MODERATE 35: CPT | Mod: ,,, | Performed by: INTERNAL MEDICINE

## 2022-10-19 PROCEDURE — 94640 AIRWAY INHALATION TREATMENT: CPT

## 2022-10-19 RX ORDER — POTASSIUM CHLORIDE 20 MEQ/1
40 TABLET, EXTENDED RELEASE ORAL 2 TIMES DAILY
Status: DISCONTINUED | OUTPATIENT
Start: 2022-10-19 | End: 2022-10-20 | Stop reason: HOSPADM

## 2022-10-19 RX ADMIN — ATORVASTATIN CALCIUM 40 MG: 40 TABLET, FILM COATED ORAL at 08:10

## 2022-10-19 RX ADMIN — POTASSIUM CHLORIDE 40 MEQ: 1500 TABLET, EXTENDED RELEASE ORAL at 08:10

## 2022-10-19 RX ADMIN — DEXTROSE 2 G: 50 INJECTION, SOLUTION INTRAVENOUS at 01:10

## 2022-10-19 RX ADMIN — POTASSIUM CHLORIDE 40 MEQ: 1500 TABLET, EXTENDED RELEASE ORAL at 09:10

## 2022-10-19 RX ADMIN — MICONAZOLE NITRATE: 20 CREAM TOPICAL at 09:10

## 2022-10-19 RX ADMIN — ATENOLOL 50 MG: 50 TABLET ORAL at 08:10

## 2022-10-19 RX ADMIN — FERROUS SULFATE TAB 325 MG (65 MG ELEMENTAL FE) 1 EACH: 325 (65 FE) TAB at 09:10

## 2022-10-19 RX ADMIN — CLOPIDOGREL BISULFATE 75 MG: 75 TABLET, FILM COATED ORAL at 09:10

## 2022-10-19 RX ADMIN — DEXTROSE 2 G: 50 INJECTION, SOLUTION INTRAVENOUS at 10:10

## 2022-10-19 RX ADMIN — ATENOLOL 100 MG: 50 TABLET ORAL at 10:10

## 2022-10-19 RX ADMIN — ENOXAPARIN SODIUM 40 MG: 100 INJECTION SUBCUTANEOUS at 09:10

## 2022-10-19 RX ADMIN — VALSARTAN 320 MG: 80 TABLET, FILM COATED ORAL at 09:10

## 2022-10-19 RX ADMIN — SILVER SULFADIAZINE: 10 CREAM TOPICAL at 11:10

## 2022-10-19 RX ADMIN — ALBUTEROL SULFATE 2.5 MG: 2.5 SOLUTION RESPIRATORY (INHALATION) at 08:10

## 2022-10-19 RX ADMIN — DEXTROSE 2 G: 50 INJECTION, SOLUTION INTRAVENOUS at 07:10

## 2022-10-19 RX ADMIN — PANTOPRAZOLE SODIUM 40 MG: 40 TABLET, DELAYED RELEASE ORAL at 05:10

## 2022-10-19 RX ADMIN — HYDROCODONE BITARTRATE AND ACETAMINOPHEN 1 TABLET: 5; 325 TABLET ORAL at 04:10

## 2022-10-19 RX ADMIN — ENOXAPARIN SODIUM 40 MG: 100 INJECTION SUBCUTANEOUS at 08:10

## 2022-10-19 NOTE — SUBJECTIVE & OBJECTIVE
Interval History:  No new complaints.  Seen with Wound Care and leg reviewed, drain still in place.  Patient feels subjectively improvement in left leg symptom.  States he does live with his wife at home.  Continues to urinate well.  Afebrile with T-max 98.5°.  Discussed with wound care and Infectious Disease.    Review of Systems   Constitutional:  Negative for fever.   Genitourinary:  Negative for difficulty urinating.   Skin:  Positive for wound.   Psychiatric/Behavioral:  Negative for confusion.    Objective:     Vital Signs (Most Recent):  Temp: 98.3 °F (36.8 °C) (10/19/22 1224)  Pulse: 76 (10/19/22 1224)  Resp: 16 (10/19/22 1613)  BP: (!) 156/79 (10/19/22 1224)  SpO2: (!) 92 % (10/19/22 1224)   Vital Signs (24h Range):  Temp:  [97.8 °F (36.6 °C)-98.5 °F (36.9 °C)] 98.3 °F (36.8 °C)  Pulse:  [76-85] 76  Resp:  [14-20] 16  SpO2:  [92 %-96 %] 92 %  BP: (133-159)/(70-84) 156/79     Weight: (!) 159.2 kg (350 lb 15.6 oz)  Body mass index is 48.95 kg/m².    Intake/Output Summary (Last 24 hours) at 10/19/2022 1652  Last data filed at 10/19/2022 1225  Gross per 24 hour   Intake 360 ml   Output 3400 ml   Net -3040 ml      Physical Exam  Vitals and nursing note reviewed.   Constitutional:       Appearance: He is obese.      Comments: Lying in bed, no apparent acute distress, cooperative   HENT:      Head: Normocephalic and atraumatic.      Mouth/Throat:      Mouth: Mucous membranes are moist.   Neck:      Comments: Redundant  Cardiovascular:      Rate and Rhythm: Normal rate and regular rhythm.   Pulmonary:      Effort: No respiratory distress.      Breath sounds: No wheezing.      Comments: On room air  Abdominal:      Palpations: Abdomen is soft.      Tenderness: There is no abdominal tenderness.   Skin:     Comments: Reviewed left leg with wound care, posterior calf drain in place, + blistering   Neurological:      Mental Status: He is alert and oriented to person, place, and time.   Psychiatric:         Mood and  Affect: Mood normal.         Significant Labs: BMP:   Recent Labs   Lab 10/18/22  0631   *      K 3.0*   CL 96   CO2 30*   BUN 13   CREATININE 0.8   CALCIUM 9.3   MG 1.8     CBC:   Recent Labs   Lab 10/18/22  0631   WBC 20.31*   HGB 11.8*   HCT 36.6*        CMP:   Recent Labs   Lab 10/18/22  0631      K 3.0*   CL 96   CO2 30*   *   BUN 13   CREATININE 0.8   CALCIUM 9.3   ANIONGAP 11     POCT Glucose: No results for input(s): POCTGLUCOSE in the last 48 hours.    Significant Imaging: I have reviewed all pertinent imaging results/findings within the past 24 hours.

## 2022-10-19 NOTE — PROGRESS NOTES
"INPATIENT NEPHROLOGY PROGRESS  [facility] NEPHROLOGY    Be Shipman  10/19/2022    Reason for consultation:    Acute kidney injury    Chief Complaint:   Chief Complaint   Patient presents with    Leg Swelling     LLE swelling and weeping, redness x2 days ago.      History of Present Illness:    Per H and P    57 year old male with history of HTN, HLD, Morbid Obesity presented to ED complaining of pain, swelling and redness of LLE X 3 days, progressively worsening in swelling and redness. Is 4-8/10 painful (aching), waxing and waning of own accord, but does worsen with usage of limb, with radiations towards groin. Has been taking "A lot" of ibuprofen for the pain. Was hypotensive (90's) coming through the door, which has responded to volume.    10/15  consulted for BRENDAN.  Pt had leg pain.  He stated that he had food poisoning starting on Monday and had severe nausea and vomiting and diarrhea all week.  He still has diarrhea.  No nausea.  Appetite is intact.  No confusion.  He denies NSAID use.  He was only taking tylenol for pain  10/16  VSS, renal function improving.  Hypokalemic, got IV repletion.  Feels better, no n/v/d.  10/17 VSS, no new complains.  10/18 VSS, no new complains. S/p left LE I&D yesterday.  10/19 VSS. Will increase KCL today.    Plan of Care:    CKD stage 2  --renal us/bladder us is OK  --rheumatologic serologies given proteinuria and microhematuria, negative  --pt doesn't have stage 5 chronic kidney disease    Hypokalemia  --continue oral KCL, will increase today  --non-renal diet should help  --Mg is OK  --daily labs while in hospital    Anemia  --iron panel and paraproteins normal  --monitor.    Cellulitis of LLE  --appreciate ID and Surgery input  --I&D on 10/17, no purulence  --on ancef    HTN  -- resume BP meds and monitor.    Thank you for allowing us to participate in this patient's care. We will continue to follow.    Vital Signs:  Temp Readings from Last 3 Encounters:   10/19/22 98.5 " °F (36.9 °C) (Oral)   05/27/22 97.7 °F (36.5 °C)   11/05/21 97.9 °F (36.6 °C)       Pulse Readings from Last 3 Encounters:   10/19/22 83   05/27/22 77   11/05/21 99       BP Readings from Last 3 Encounters:   10/19/22 (!) 159/84   05/27/22 136/84   11/05/21 (!) 136/92       Weight:  Wt Readings from Last 3 Encounters:   10/18/22 (!) 159.2 kg (350 lb 15.6 oz)   05/27/22 (!) 158.3 kg (349 lb)   11/05/21 (!) 156.5 kg (345 lb)       Past Medical & Surgical History:  Past Medical History:   Diagnosis Date    Anticoagulant long-term use     Coronary artery disease     Diabetes mellitus     Hyperlipidemia     Hypertension     Morbid obesity        Past Surgical History:   Procedure Laterality Date    CORONARY STENT PLACEMENT  [facility]09[facility]2019    [facility]    HAND SURGERY Left     22 y/o, laceration repair    HERNIA REPAIR      umbilical hernia    INCISION AND DRAINAGE FOOT Right 09/13/2021    Procedure: INCISION AND DRAINAGE, FOOT;  Surgeon: Juan Jose Julio DPM;  Location: [facility] OR;  Service: Podiatry;  Laterality: Right;    left breast tumor removed (benign)       LEFT HEART CATHETERIZATION Left 08/09/2019    Procedure: Left heart cath;  Surgeon: Erik Jessica MD;  Location: [facility] CATH;  Service: Cardiology;  Laterality: Left;       Past Social History:  Social History     Socioeconomic History    Marital status:    Tobacco Use    Smoking status: Former    Smokeless tobacco: Former     Quit date: 7/22/2000    Tobacco comments:     quit 12 years ago   Substance and Sexual Activity    Alcohol use: Yes     Comment: every day 1/2 a fifth of whiskey per day    Drug use: No    Sexual activity: Yes     Social Determinants of Health     Financial Resource Strain: Low Risk     Difficulty of Paying Living Expenses: Not very hard   Food Insecurity: No Food Insecurity    Worried About Running Out of Food in the Last Year: Never true    Ran Out of Food in the Last Year: Never true    Transportation Needs: No Transportation Needs    Lack of Transportation (Medical): No    Lack of Transportation (Non-Medical): No   Physical Activity: Sufficiently Active    Days of Exercise per Week: 5 days    Minutes of Exercise per Session: 30 min   Stress: No Stress Concern Present    Feeling of Stress : Not at all   Social Connections: Moderately Isolated    Frequency of Communication with Friends and Family: More than three times a week    Frequency of Social Gatherings with Friends and Family: More than three times a week    Attends Rastafari Services: Never    Active Member of Clubs or Organizations: No    Attends Club or Organization Meetings: Never    Marital Status:    Housing Stability: Low Risk     Unable to Pay for Housing in the Last Year: No    Number of Places Lived in the Last Year: 1    Unstable Housing in the Last Year: No       Medications:  No current facility-administered medications on file prior to encounter.     Current Outpatient Medications on File Prior to Encounter   Medication Sig Dispense Refill    allopurinoL (ZYLOPRIM) 300 MG tablet TAKE 1 TABLET BY MOUTH EVERY DAY (Patient taking differently: Take 300 mg by mouth once daily.) 90 tablet 0    amLODIPine (NORVASC) 10 MG tablet Take 1 tablet (10 mg total) by mouth once daily. 90 tablet 3    atenoloL (TENORMIN) 50 MG tablet Take 50 mg by mouth every evening.      atenoloL-chlorthalidone (TENORETIC) 100-25 mg per tablet Take 1 tablet by mouth once daily.      atorvastatin (LIPITOR) 40 MG tablet TAKE 1 TABLET BY MOUTH EVERY DAY (Patient taking differently: Take 40 mg by mouth once daily.) 90 tablet 0    cloNIDine (CATAPRES) 0.1 MG tablet Take 1 tablet (0.1 mg total) by mouth 2 (two) times daily. 180 tablet 0    clopidogreL (PLAVIX) 75 mg tablet TAKE 1 TABLET BY MOUTH EVERY DAY (Patient taking differently: Take 75 mg by mouth once daily.) 90 tablet 3    esomeprazole (NEXIUM) 40 MG capsule Take 40 mg by mouth before breakfast.       "furosemide (LASIX) 20 MG tablet Take 1 tablet (20 mg total) by mouth once daily. 90 tablet 0    olmesartan (BENICAR) 40 MG tablet Take 1 tablet (40 mg total) by mouth once daily. 90 tablet 3    potassium chloride SA (K-DUR,KLOR-CON) 20 MEQ tablet TAKE 1 TABLET BY MOUTH EVERY DAY (Patient taking differently: Take 20 mEq by mouth once daily.) 90 tablet 0    metFORMIN (GLUCOPHAGE-XR) 500 MG ER 24hr tablet TAKE 1 TABLET BY MOUTH EVERY DAY WITH BREAKFAST (Patient taking differently: Take 500 mg by mouth daily with breakfast.) 90 tablet 0    nitroGLYCERIN (NITROSTAT) 0.4 MG SL tablet Place 1 tablet (0.4 mg total) under the tongue every 5 (five) minutes as needed for Chest pain. 25 tablet 2     Scheduled Meds:   albuterol sulfate  2.5 mg Nebulization Q6H WAKE    atenoloL  100 mg Oral Daily    atenoloL  50 mg Oral QHS    atorvastatin  40 mg Oral QHS    ceFAZolin (ANCEF) IVPB  2 g Intravenous Q8H    clopidogreL  75 mg Oral Daily    enoxaparin  40 mg Subcutaneous Q12H    ferrous sulfate  1 tablet Oral Daily    miconazole   Topical (Top) BID    pantoprazole  40 mg Oral Before breakfast    potassium chloride  20 mEq Oral BID    silver sulfADIAZINE 1%   Topical (Top) Daily    valsartan  320 mg Oral Daily     Continuous Infusions:      PRN Meds:.acetaminophen, hydrALAZINE, HYDROcodone-acetaminophen, magnesium oxide, magnesium oxide, melatonin, morphine, nitroGLYCERIN, ondansetron, potassium bicarbonate, potassium bicarbonate, potassium bicarbonate, potassium, sodium phosphates, potassium, sodium phosphates, potassium, sodium phosphates, senna-docusate 8.6-50 mg    Allergies:  Tetanus vaccines and toxoid    Past Family History:  Reviewed; refer to Hospitalist Admission Note    Review of Systems:  Review of Systems - All 14 systems reviewed and negative, except as noted in HPI    Physical Exam:    BP (!) 159/84   Pulse 83   Temp 98.5 °F (36.9 °C) (Oral)   Resp 14   Ht 5' 11" (1.803 m)   Wt (!) 159.2 kg (350 lb 15.6 oz)   SpO2 " 95%   BMI 48.95 kg/m²     General Appearance:    Alert, cooperative, no distress, appears stated age   Head:    Normocephalic, without obvious abnormality, atraumatic   Eyes:    PER, conjunctiva/corneas clear, EOM's intact in both eyes        Throat:   Lips, mucosa, and tongue normal; teeth and gums normal   Back:     Symmetric, no curvature, ROM normal, no CVA tenderness   Lungs:     Clear to auscultation bilaterally, respirations unlabored   Chest wall:    No tenderness or deformity   Heart:    Regular rate and rhythm, S1 and S2 normal, no murmur, rub   or gallop   Abdomen:     Soft, non-tender, bowel sounds active all four quadrants,     no masses, no organomegaly   Extremities:   Left leg swollen and red   Pulses:   2+ and symmetric all extremities   MSK:   No joint or muscle swelling, tenderness or deformity   Skin:   Skin color, texture, turgor normal, no rashes or lesions   Neurologic:   CNII-XII intact, normal strength and sensation       Throughout.  No flap     Results:  Recent Labs   Lab 10/16/22  0259 10/17/22  0658 10/18/22  0631    141 137   K 2.6* 3.2* 3.0*   CL 98 102 96   CO2 27 25 30*   BUN 54* 20 13   CREATININE 1.9* 0.8 0.8   * 142* 129*         Recent Labs   Lab 10/14/22  1615 10/15/22  0525 10/16/22  0259 10/17/22  0658 10/18/22  0631   CALCIUM 8.4*   < > 8.6* 9.5 9.3   ALBUMIN 3.1*  --  2.6* 2.7*  --    PHOS  --   --   --   --  3.3   MG  --   --  1.9 2.0 1.8    < > = values in this interval not displayed.               No results for input(s): POCTGLUCOSE in the last 168 hours.    Recent Labs   Lab 11/05/21  0758 07/07/22  0758 10/15/22  0525   Hemoglobin A1C 6.0 6.7 H 6.3 H         Recent Labs   Lab 10/16/22  0259 10/17/22  0657 10/18/22  0631   WBC 17.32*  17.32* 19.97* 20.31*   HGB 10.8*  10.8* 11.7* 11.8*   HCT 32.8*  32.8* 36.2* 36.6*     234 325 372   MCV 91  91 90 93   MCHC 32.9  32.9 32.3 32.2   MONO 4.0  4.0 6.0  CANCELED  --          Recent Labs   Lab  10/14/22  1615 10/16/22  0259 10/17/22  0658   BILITOT 1.4* 1.1* 0.8   PROT 7.7 6.7 7.3   ALBUMIN 3.1* 2.6* 2.7*   ALKPHOS 124 196* 213*   ALT 43 62* 68*   AST 86* 97* 75*         Recent Labs   Lab 04/10/21  0738 09/11/21  1612 10/14/22  1649   Color, UA Yellow Yellow Yellow   Appearance, UA  --  Clear Hazy A   Clarity, UA Clear  --   --    pH, UA 6.0 7.0 5.0   Specific [place], UA 1.021 1.015 1.020   Protein, UA Negative Negative 1+ A   Glucose, UA  --  Negative Negative   Ketones, UA Negative Negative Trace A   Urobilinogen, UA 0.2 Negative 2.0-3.0 A   Bilirubin (UA)  --  Negative 1+ A   Occult Blood UA Negative Negative 1+ A   Nitrite, UA Negative Negative Negative   RBC, UA  --  1 7 H   WBC, UA  --  4 32 H   Bacteria  --  Negative Negative   Hyaline Casts, UA  --  1 47 A               Microbiology Results (last 7 days)       Procedure Component Value Units Date/Time    Blood culture #1 **CANNOT BE ORDERED STAT** [[id number]] Collected: 10/14/22 1615    Order Status: Completed Specimen: Blood from Peripheral, Forearm, Left Updated: 10/18/22 1832     Blood Culture, Routine No Growth to date      No Growth to date      No Growth to date      No Growth to date      No Growth to date    Blood culture #2 **CANNOT BE ORDERED STAT** [[id number]] Collected: 10/14/22 1643    Order Status: Completed Specimen: Blood from Peripheral, Antecubital, Right Updated: 10/18/22 1832     Blood Culture, Routine No Growth to date      No Growth to date      No Growth to date      No Growth to date      No Growth to date    Aerobic culture [[id number]] Collected: 10/17/22 0920    Order Status: Completed Specimen: Wound from Leg, Left Updated: 10/18/22 0659     Aerobic Bacterial Culture No growth    Narrative:      Leg, Left Lower Extremity    Aerobic culture [[id number]] Collected: 10/16/22 1800    Order Status: Completed Specimen: Skin from Leg, Left Updated: 10/18/22 0658     Aerobic Bacterial Culture No growth    Narrative:      bullae     Gram stain [[id number]] Collected: 10/17/22 0920    Order Status: Completed Specimen: Wound from Leg, Left Updated: 10/17/22 1405     Gram Stain Result No WBC's      No organisms seen    Narrative:      Leg, Left Lower Extremity    Fungus culture [[id number]] Collected: 10/17/22 0920    Order Status: Sent Specimen: Wound from Leg, Left Updated: 10/17/22 0956    AFB Culture & Smear [[id number]] Collected: 10/17/22 0920    Order Status: Sent Specimen: Wound from Leg, Left Updated: 10/17/22 0956    Culture, Anaerobic [[id number]] Collected: 10/17/22 0920    Order Status: Sent Specimen: Wound from Leg, Left Updated: 10/17/22 0956    Culture, Anaerobic [[id number]] Collected: 10/16/22 1800    Order Status: Sent Specimen: Skin from Leg, Left Updated: 10/16/22 2006    Urine culture [[id number]] Collected: 10/14/22 1649    Order Status: Completed Specimen: Urine Updated: 10/16/22 0635     Urine Culture, Routine Multiple organisms isolated. None in predominance.  Repeat if      clinically necessary.    Narrative:      Specimen Source->Urine             Patient care was time spent personally by me on the following activities:   Obtaining a history  Examination of patient.  Providing medical care at the patients bedside.  Developing a treatment plan with patient or surrogate and bedside caregivers  Ordering and reviewing laboratory studies, radiographic studies, pulse oximetry.  Ordering and performing treatments and interventions.  Evaluation of patient's response to treatment.  Discussions with consultants while on the unit and immediately available to the patient.  Re-evaluation of the patient's condition.  Documentation in the medical record.     Prince Good MD  Nephrology  [facility] [place]  [phone]

## 2022-10-19 NOTE — PT/OT/SLP EVAL
Physical Therapy Evaluation and Discharge Note    Patient Name:  Be Shipman   MRN:  [id number]    Recommendations:     Discharge Recommendations:  home   Discharge Equipment Recommendations: none   Barriers to discharge:  medical status    Assessment:     Be Shipman is a 57 y.o. male admitted with a medical diagnosis of Cellulitis of left lower extremity. .  At this time, patient is functioning at their prior level of function and does not require further acute PT services.     Patient state he has been moving around the chair and up at EOB or bedside chair Independently with no issues or concerns. No loss of balance or shortness of breath with room mobility with no AD. No skilled acute care PT needs at this time.    Recent Surgery: Procedure(s) (LRB):  INCISION AND DRAINAGE, LOWER EXTREMITY (Left) 2 Days Post-Op    Plan:     During this hospitalization, patient does not require further acute PT services.  Please re-consult if situation changes.      Subjective     Chief Complaint: left LE pain and swelling  Patient/Family Comments/goals: go home  Pain/Comfort:  Pain Rating 1: other (see comments) (did not quantify)  Location - Side 1: Left  Location 1: leg  Pain Addressed 1: Reposition, Distraction, Cessation of Activity    Patients cultural, spiritual, Denominational conflicts given the current situation: no    Living Environment:  Pt lives with his wife in a two story home with first floor setup. Works full time, (+) ,   Prior to admission, patients level of function was Independent.  Equipment used at home: none.  DME owned (not currently used): none.  Upon discharge, patient will have assistance from wife.    Objective:     Communicated with RN prior to session.  Patient found HOB elevated with peripheral IV, telemetry upon PT entry to room.    General Precautions: Standard, fall   Orthopedic Precautions:N/A   Braces: N/A   Respiratory Status: Room air    Exams:  Cognitive Exam:  Patient is  oriented to Person, Place, Time, and Situation  Skin Integrity/Edema:      -       Edema: Pitting LLE   RLE ROM: WFL  RLE Strength: WFL  LLE ROM: WFL  LLE Strength: WFL    Functional Mobility:  Bed Mobility:     Supine to Sit: independence  Transfers:     Sit to Stand:  independence with no AD  Gait: 40 ft with supervision and no AD    AM-PAC 6 CLICK MOBILITY  Total Score:24       Treatment and Education:   Pt educated on POC, discharge recommendation, importance of time OOB, importance of elevation, use of call bell to seek assistance as needed and fall prevention      AM-PAC 6 CLICK MOBILITY  Total Score:24     Patient left sitting edge of bed with all lines intact, call button in reach, and RN notified.    GOALS:   Multidisciplinary Problems       Physical Therapy Goals       Not on file                    History:     Past Medical History:   Diagnosis Date    Anticoagulant long-term use     Coronary artery disease     Diabetes mellitus     Hyperlipidemia     Hypertension     Morbid obesity        Past Surgical History:   Procedure Laterality Date    CORONARY STENT PLACEMENT  08/09/2019    [facility]    HAND SURGERY Left     22 y/o, laceration repair    HERNIA REPAIR      umbilical hernia    INCISION AND DRAINAGE FOOT Right 09/13/2021    Procedure: INCISION AND DRAINAGE, FOOT;  Surgeon: Juan Jose Julio DPM;  Location: [facility] OR;  Service: Podiatry;  Laterality: Right;    left breast tumor removed (benign)       LEFT HEART CATHETERIZATION Left 08/09/2019    Procedure: Left heart cath;  Surgeon: Erik Jessica MD;  Location: [facility] CATH;  Service: Cardiology;  Laterality: Left;       Time Tracking:     PT Received On: 10/19/22  PT Start Time: 0851     PT Stop Time: 0859  PT Total Time (min): 8 min     Billable Minutes: Evaluation 8      10/19/2022

## 2022-10-19 NOTE — PROGRESS NOTES
"[facility] Medicine  Progress Note    Patient Name: Be Shipman  MRN: [id number]  Patient Class: IP- Inpatient   Admission Date: 10/14/2022  Length of Stay: 5 days  Attending Physician: Yennifer oBjorquez MD  Primary Care Provider: Werner Patel NP        Subjective:     Principal Problem:Cellulitis of left lower extremity        HPI:  57 year old male with history of HTN, HLD, Morbid Obesity presented to ED complaining of pain, swelling and redness of LLE X 3 days, progressively worsening in swelling and redness. Is 4-8/10 painful (aching), waxing and waning of own accord, but does worsen with usage of limb, with radiations towards groin. Has been taking "A lot" of ibuprofen for the pain. Was hypotensive (90's) coming through the door, which has responded to volume.    In ED: labs reviewed and noted below: leukocytosis with minimal normocytic anemia; hyponatremia, hypokalemia (treated in ED) with stage 4/5 renal dysfunction (no prior history); lactate is normal. UA: mild protein and hematuria; nitrite neg. Cultured in ED. Tib/Fib films reviewed: no bony path. US leg: no DVT. Telemetry reviewed: sinus    Discussed with ED MD: Inpatient admission; culture as above; volume; hold nephrotoxins (ARB, Tenoretic, NSAID's), prn analgesia; hold hypertensives currently using hydralazine per prn orders instead; broad spectrum antibiotics currently with narrowing pending culture results; Nephrology consultation; A1c, TSH with AM labs for review      Overview/Hospital Course:  Seen and examined   Patient reported diarrheal episodes recently and also was taking ibuprofen for his left leg swelling.   Ultrasound legs negative for DVT. BRENDAN worsen .     10/16  Admitted with severe BRENDAN secondary to gastroenteritis and also with left leg severe cellulitis  Today   Patient seen and examined.  His left leg acutely worsened with more poorly and tense and spreading cellulitis above knee  CT ordered and no abscess " and no gas seen .  D/w Dr Rivas and consulted .  Creatinine back to 1.9    Assumed care of this patient on 10/17/22.  Patient with a history of obesity with BMI 49, ELLIE not using CPAP, diabetes mellitus, hypertension, hyperlipidemia, CAD status post PCI, he presented with 2 day history of worsening left lower extremity pain, swelling and redness, denies any preceding trauma or injury.  States he did have nausea, vomiting and diarrhea which he attributes to gastroenteritis from chicken.  He denies any NSAID use, states he was taking antihistamines at home.  On presentation noted to have leukocytosis with WBC 18, acute kidney injury creatinine 4.7, hypokalemia with potassium 2.6, hyponatremia with sodium 129.  Clinical evidence of cellulitis to left lower extremity, ultrasound no DVT, CT with extensive edema, no soft tissue gas, no foreign body, no definite abscess collection.  He was admitted started on vancomycin and cefepime with Infectious Disease consultation.  Nephrology was also consulted for acute kidney injury, ultrasound no evidence of obstruction, nephrotoxic medications were held, IV fluid hydration.  On 10/17 status post I&D OR by , no purulent see noted, serosanguineous fluid, OR cultures submitted, suspect will have bruising/bloody discharge.  He is currently on vancomycin and clindamycin.  Acute kidney injury has resolved.            Interval History:  No new complaints.  Seen with Wound Care and leg reviewed, drain still in place.  Patient feels subjectively improvement in left leg symptom.  States he does live with his wife at home.  Continues to urinate well.  Afebrile with T-max 98.5°.  Discussed with wound care and Infectious Disease.    Review of Systems   Constitutional:  Negative for fever.   Genitourinary:  Negative for difficulty urinating.   Skin:  Positive for wound.   Psychiatric/Behavioral:  Negative for confusion.    Objective:     Vital Signs (Most Recent):  Temp:  98.3 °F (36.8 °C) (10/19/22 1224)  Pulse: 76 (10/19/22 1224)  Resp: 16 (10/19/22 1613)  BP: (!) 156/79 (10/19/22 1224)  SpO2: (!) 92 % (10/19/22 1224)   Vital Signs (24h Range):  Temp:  [97.8 °F (36.6 °C)-98.5 °F (36.9 °C)] 98.3 °F (36.8 °C)  Pulse:  [76-85] 76  Resp:  [14-20] 16  SpO2:  [92 %-96 %] 92 %  BP: (133-159)/(70-84) 156/79     Weight: (!) 159.2 kg (350 lb 15.6 oz)  Body mass index is 48.95 kg/m².    Intake/Output Summary (Last 24 hours) at 10/19/2022 1652  Last data filed at 10/19/2022 1225  Gross per 24 hour   Intake 360 ml   Output 3400 ml   Net -3040 ml      Physical Exam  Vitals and nursing note reviewed.   Constitutional:       Appearance: He is obese.      Comments: Lying in bed, no apparent acute distress, cooperative   HENT:      Head: Normocephalic and atraumatic.      Mouth/Throat:      Mouth: Mucous membranes are moist.   Neck:      Comments: Redundant  Cardiovascular:      Rate and Rhythm: Normal rate and regular rhythm.   Pulmonary:      Effort: No respiratory distress.      Breath sounds: No wheezing.      Comments: On room air  Abdominal:      Palpations: Abdomen is soft.      Tenderness: There is no abdominal tenderness.   Skin:     Comments: Reviewed left leg with wound care, posterior calf drain in place, + blistering   Neurological:      Mental Status: He is alert and oriented to person, place, and time.   Psychiatric:         Mood and Affect: Mood normal.         Significant Labs: BMP:   Recent Labs   Lab 10/18/22  0631   *      K 3.0*   CL 96   CO2 30*   BUN 13   CREATININE 0.8   CALCIUM 9.3   MG 1.8     CBC:   Recent Labs   Lab 10/18/22  0631   WBC 20.31*   HGB 11.8*   HCT 36.6*        CMP:   Recent Labs   Lab 10/18/22  0631      K 3.0*   CL 96   CO2 30*   *   BUN 13   CREATININE 0.8   CALCIUM 9.3   ANIONGAP 11     POCT Glucose: No results for input(s): POCTGLUCOSE in the last 48 hours.    Significant Imaging: I have reviewed all pertinent imaging  results/findings within the past 24 hours.      Assessment/Plan:      Active Hospital Problems    Diagnosis    *Cellulitis of left lower extremity    Leucocytosis    Severe obesity (BMI >= 40)    Type 2 diabetes mellitus, without long-term current use of insulin, HBA1C 6.3%    Hypokalemia    ELLIE (obstructive sleep apnea) cannot tolerate cpap    S/P coronary artery stent placement - LAD 08/09/2019    Hyperlipidemia, mixed    Essential hypertension, benign    Alcohol abuse, daily use        Plan:  Continue care on medical floor   On 10/17 status post OR I&D, no purulence, now new culture results  On 10/17 antibiotics de-escalated to Ancef, 1 week of Zyvox on discharge  Communicated with general surgery timing of drain removal  Elevation left lower extremity   Continue p.r.n. pain control as ordered  Wound care consult, daily dressing to left lower extremity   20 mEq b.i.d. standing dose oral potassium supplementation and monitor  Topical anti fungal between the toes  ELLIE, not using CPAP, continue counseling  Needs lifestyle modification for weight loss   Previous echocardiogram with EF of 58%   Increase activity, out of bed to chair, mobilize   A.m. labs ordered  Home health orders placed  Appreciate all consultant's input   Further plan as per clinical course    VTE Risk Mitigation (From admission, onward)         Ordered     enoxaparin injection 40 mg  Every 12 hours         10/17/22 0921     IP VTE HIGH RISK PATIENT  Once         10/15/22 0109     Place sequential compression device  Until discontinued         10/15/22 0109                Discharge Planning   QUINTIN: 10/21/2022     Code Status: Full Code   Is the patient medically ready for discharge?:     Reason for patient still in hospital (select all that apply): Patient trending condition  Discharge Plan A: Home Health                  Yennifer Bojorquez MD  Department of Hospital Medicine   [facility]

## 2022-10-19 NOTE — PLAN OF CARE
Problem: Adult Inpatient Plan of Care  Goal: Plan of Care Review  Outcome: Ongoing, Progressing  Goal: Patient-Specific Goal (Individualized)  Outcome: Ongoing, Progressing  Goal: Absence of Hospital-Acquired Illness or Injury  Outcome: Ongoing, Progressing  Goal: Optimal Comfort and Wellbeing  Outcome: Ongoing, Progressing  Goal: Readiness for Transition of Care  Outcome: Ongoing, Progressing     Problem: Bariatric Environmental Safety  Goal: Safety Maintained with Care  Outcome: Ongoing, Progressing     Problem: Diabetes Comorbidity  Goal: Blood Glucose Level Within Targeted Range  Outcome: Ongoing, Progressing     Problem: Fluid and Electrolyte Imbalance (Acute Kidney Injury/Impairment)  Goal: Fluid and Electrolyte Balance  Outcome: Ongoing, Progressing     Problem: Oral Intake Inadequate (Acute Kidney Injury/Impairment)  Goal: Optimal Nutrition Intake  Outcome: Ongoing, Progressing     Problem: Renal Function Impairment (Acute Kidney Injury/Impairment)  Goal: Effective Renal Function  Outcome: Ongoing, Progressing     Problem: Impaired Wound Healing  Goal: Optimal Wound Healing  Outcome: Ongoing, Progressing     Problem: Skin Injury Risk Increased  Goal: Skin Health and Integrity  Outcome: Ongoing, Progressing     Problem: Adjustment to Illness (Sepsis/Septic Shock)  Goal: Optimal Coping  Outcome: Ongoing, Progressing     Problem: Bleeding (Sepsis/Septic Shock)  Goal: Absence of Bleeding  Outcome: Ongoing, Progressing     Problem: Glycemic Control Impaired (Sepsis/Septic Shock)  Goal: Blood Glucose Level Within Desired Range  Outcome: Ongoing, Progressing     Problem: Infection Progression (Sepsis/Septic Shock)  Goal: Absence of Infection Signs and Symptoms  Outcome: Ongoing, Progressing     Problem: Nutrition Impaired (Sepsis/Septic Shock)  Goal: Optimal Nutrition Intake  Outcome: Ongoing, Progressing

## 2022-10-19 NOTE — NURSING
57 yr old male  awake and alert   Dressing change to the left lower leg   Dressing removed and cleaned with wound wash  Pat dry silvadine applied and covered with 4x4 and abd pads   Wrapped with kerlex and covered with stocking net

## 2022-10-19 NOTE — PROGRESS NOTES
"Consult Note  Infectious Disease    Reason for Consult:  cellulitis    HPI: Be Shipman is a 57 y.o. male with past medical history of obesity, BMI 48, HTN, DLP, diet-controlled diabetes, hemoglobin A1c 6.7--6.3.  came to ED complaining of left lower extremity swelling redness and pain x3 days duration, progressively worse.  This happened to 3 days after he had some food poisoning after eating some chicken that set out for a day.      In ED, he was found hypotensive and responded to fluids.  Left leg X-ray ruled out foreign body; ultrasound ruled out DVT.  He was admitted and started on cefepime on vancomycin for cellulitis, today is day 3 of treatment.  Blood cultures are negative to date.  WBC is not changing much 18--19--17.  Nephrologist is following for BRENDAN.  Creatinine is improved 4.7--5.4--1.9.      I came and saw patient.  I cleaned his left leg.  Collected cultures from one of the blisters on the medial part of the left leg.  Patient is allergic to tetanus and of course is not up-to-date.    10/17: ([place]) d/w Dr. Carranza. S/p limited exploration of soft tissue with no findings of purulence. The nausea and vomiting preceded the development of cellulitis and may be due to the strep infection rather than the food poisoning that he presumed he had. He is wheezing and does indicate that he has some "phlegm".   10/18: Interim reviewed.  Afebrile, cultures from left leg bulla taken by Dr. Carranza and in surgery are both negative.  White blood cells are about the same.  Creatinine remains in the normal range. Lots of blistering along lower leg, c/w strep cellulitis. Advised he will desquamate all of these areas.   10/19: Interim reviewed.  Afebrile.  No labs today. His leg is definitely improved, with reduced swelling and intensive of erythema. Blister tissue is coming off. Seen with wound care. Not sleeping at night, is getting up around his room but not in the marie. Refuses to bathe. Cultures from leg are " negative.     Antibiotics (From admission, onward)      Start     Stop Route Frequency Ordered    10/17/22 1800  cefazolin (ANCEF) 2 gram in dextrose 5% 50 mL IVPB (premix)         -- IV Every 8 hours (non-standard times) 10/17/22 1651    10/17/22 0900  silver sulfADIAZINE 1% cream         -- Top Daily 10/16/22 2117          Antifungals (From admission, onward)      Start     Stop Route Frequency Ordered    10/17/22 2100  miconazole 2 % cream         -- Top 2 times daily 10/17/22 1842             EXAM & DIAGNOSTICS REVIEWED:   Vitals:     Temp:  [97.8 °F (36.6 °C)-98.5 °F (36.9 °C)]   Temp: 98.3 °F (36.8 °C) (10/19/22 1224)  Pulse: 76 (10/19/22 1224)  Resp: 16 (10/19/22 1224)  BP: (!) 156/79 (10/19/22 1224)  SpO2: (!) 92 % (10/19/22 1224)    Intake/Output Summary (Last 24 hours) at 10/19/2022 1510  Last data filed at 10/19/2022 1225  Gross per 24 hour   Intake 360 ml   Output 3400 ml   Net -3040 ml       General:  In NAD. Alert and attentive, cooperative, comfortable  Eyes:  Anicteric, , EOMI  ENT:  No ulcers, exudates, thrush, nares patent, dentition is fair  Neck:  supple,    Lungs:  clear  Heart:  RRR,    Abd:   obese,    ND,   :  Voids,   Musc:  Joints without effusion, swelling, erythema, synovitis, muscle wasting.   Skin:  See picture . Tinea minimal.     Neuro:             Alert, attentive, speech fluent, face symmetric, moves all extremities, no focal weakness. Ambulatory  Psych:  Calm, cooperative  Lymphatic:       Extrem: Left leg is swollen, red, tender to minimal movement.  No lymph node enlargement.    Some tinea pedis present See pictures of left leg, otherwise No edema, erythema, phlebitis, cellulitis, warm and well perfused  VAD:       Isolation:    Wound:             10/18: today the intensive of the redness is improved. There is still extensive cellulitis, extending laterally to left thigh. There is extensive blistering and weeping and macerated skin which will all desquamate.  10/19: leg is  definitely better with reduced swelling and intensity of erythema. There is still dependent erythema of left lateral thigh.        General Labs reviewed:  Recent Labs   Lab 10/16/22  0259 10/17/22  0657 10/18/22  0631   WBC 17.32*  17.32* 19.97* 20.31*   HGB 10.8*  10.8* 11.7* 11.8*   HCT 32.8*  32.8* 36.2* 36.6*     234 325 372       Recent Labs   Lab 10/14/22  1615 10/15/22  0525 10/16/22  0259 10/17/22  0658 10/18/22  0631   *   < > 136 141 137   K 2.6*   < > 2.6* 3.2* 3.0*   CL 91*   < > 98 102 96   CO2 25   < > 27 25 30*   BUN 55*   < > 54* 20 13   CREATININE 4.7*   < > 1.9* 0.8 0.8   CALCIUM 8.4*   < > 8.6* 9.5 9.3   PROT 7.7  --  5.5*  6.7 7.3  --    BILITOT 1.4*  --  1.1* 0.8  --    ALKPHOS 124  --  196* 213*  --    ALT 43  --  62* 68*  --    AST 86*  --  97* 75*  --     < > = values in this interval not displayed.     Recent Labs   Lab 10/17/22  0658   CRP 22.13*         Micro:  Microbiology Results (last 7 days)       Procedure Component Value Units Date/Time    Aerobic culture [[id number]] Collected: 10/16/22 1800    Order Status: Completed Specimen: Skin from Leg, Left Updated: 10/19/22 1129     Aerobic Bacterial Culture No growth    Narrative:      bullae    Aerobic culture [[id number]] Collected: 10/17/22 0920    Order Status: Completed Specimen: Wound from Leg, Left Updated: 10/19/22 1128     Aerobic Bacterial Culture No growth    Narrative:      Leg, Left Lower Extremity    Blood culture #1 **CANNOT BE ORDERED STAT** [[id number]] Collected: 10/14/22 1615    Order Status: Completed Specimen: Blood from Peripheral, Forearm, Left Updated: 10/18/22 1832     Blood Culture, Routine No Growth to date      No Growth to date      No Growth to date      No Growth to date      No Growth to date    Blood culture #2 **CANNOT BE ORDERED STAT** [[id number]] Collected: 10/14/22 1643    Order Status: Completed Specimen: Blood from Peripheral, Antecubital, Right Updated: 10/18/22 1832     Blood Culture,  "Routine No Growth to date      No Growth to date      No Growth to date      No Growth to date      No Growth to date    Gram stain [[id number]] Collected: 10/17/22 0920    Order Status: Completed Specimen: Wound from Leg, Left Updated: 10/17/22 1405     Gram Stain Result No WBC's      No organisms seen    Narrative:      Leg, Left Lower Extremity    Fungus culture [[id number]] Collected: 10/17/22 0920    Order Status: Sent Specimen: Wound from Leg, Left Updated: 10/17/22 0956    AFB Culture & Smear [[id number]] Collected: 10/17/22 0920    Order Status: Sent Specimen: Wound from Leg, Left Updated: 10/17/22 0956    Culture, Anaerobic [[id number]] Collected: 10/17/22 0920    Order Status: Sent Specimen: Wound from Leg, Left Updated: 10/17/22 0956    Culture, Anaerobic [[id number]] Collected: 10/16/22 1800    Order Status: Sent Specimen: Skin from Leg, Left Updated: 10/16/22 2006    Urine culture [[id number]] Collected: 10/14/22 1649    Order Status: Completed Specimen: Urine Updated: 10/16/22 0635     Urine Culture, Routine Multiple organisms isolated. None in predominance.  Repeat if      clinically necessary.    Narrative:      Specimen Source->Urine            Imaging Reviewed:  X-ray leg  Diffuse visualized left lower extremity subcutaneous edema suggested without acute bony abnormality.     Ultrasound of left leg negative for DVT     CT lower extremity  Severe extensive subcutaneous edema of the left lower extremity.   No acute osseous abnormality.   Negative for soft tissue gas.   Dermal lesion along the posterior medial aspect of the calf. Please correlate with physical exam.     Cardiology:  Reviewed EC CYRIL in chart.  EF 58%.  PASP 27.  Mild mitral regurg, mild tricuspid regurg,    IMPRESSION & PLAN     Severe Left lower extremity cellulitis, probably due to Streptococcus  S/p I and D without findings of purulence  Tinea pedis, local treatment   Pyuria on admission, WBC of 32; no complaints  Recent "food " "poisoning"  BRENDAN, dehydration, resolved  Probable diabetes, needs to be on diabetic diet.  Patient denies having diabetes to me.    Past medical history of obesity, BMI 48, HTN, D LP    Recommendations:    ancef alone  Topical antifungal  CBC, CMP, CRP in a.m.   Perhaps home tomorrow on a week of zyvox  He will need to follow up with surgery or have those penroses taken out before discharge  Elevate leg when not walking  Encouraged to get out of bed  Tight glucose control.    Follow cultures      Medical Decision Making during this encounter was  [_] Low Complexity  [_] Moderate Complexity  [ xx ] High Complexity     "

## 2022-10-19 NOTE — PLAN OF CARE
10/19/22 0819   Patient Assessment/Suction   Level of Consciousness (AVPU) alert   Respiratory Effort Normal;Unlabored   Expansion/Accessory Muscles/Retractions no use of accessory muscles   All Lung Fields Breath Sounds clear;equal bilaterally;diminished   Rhythm/Pattern, Respiratory unlabored;pattern regular;depth regular   Cough Frequency infrequent   Cough Type nonproductive   PRE-TX-O2   O2 Device (Oxygen Therapy) room air   SpO2 95 %   Pulse Oximetry Type Intermittent   $ Pulse Oximetry - Multiple Charge Pulse Oximetry - Multiple   Pulse 83   Resp 14   Aerosol Therapy   $ Aerosol Therapy Charges Aerosol Treatment   Daily Review of Necessity (SVN) completed   Respiratory Treatment Status (SVN) given   Treatment Route (SVN) mouthpiece;oxygen   Patient Position (SVN) HOB elevated   Post Treatment Assessment (SVN) breath sounds improved   Signs of Intolerance (SVN) none   Breath Sounds Post-Respiratory Treatment   Throughout All Fields Post-Treatment All Fields   Throughout All Fields Post-Treatment aeration increased   Education   $ Education Bronchodilator;15 min   Respiratory Evaluation   $ Care Plan Tech Time 15 min   $ Eval/Re-eval Charges Re-evaluation

## 2022-10-20 VITALS
RESPIRATION RATE: 18 BRPM | SYSTOLIC BLOOD PRESSURE: 145 MMHG | TEMPERATURE: 98 F | HEART RATE: 74 BPM | OXYGEN SATURATION: 92 % | DIASTOLIC BLOOD PRESSURE: 85 MMHG | HEIGHT: 71 IN | WEIGHT: 315 LBS | BODY MASS INDEX: 44.1 KG/M2

## 2022-10-20 PROBLEM — E87.6 HYPOKALEMIA: Status: RESOLVED | Noted: 2022-10-15 | Resolved: 2022-10-20

## 2022-10-20 LAB
ALBUMIN SERPL BCP-MCNC: 2.6 G/DL (ref 3.5–5.2)
ALP SERPL-CCNC: 178 U/L (ref 55–135)
ALT SERPL W/O P-5'-P-CCNC: 50 U/L (ref 10–44)
ANION GAP SERPL CALC-SCNC: 10 MMOL/L (ref 8–16)
ANISOCYTOSIS BLD QL SMEAR: SLIGHT
AST SERPL-CCNC: 67 U/L (ref 10–40)
BACTERIA SPEC AEROBE CULT: NO GROWTH
BACTERIA SPEC AEROBE CULT: NO GROWTH
BASOPHILS # BLD AUTO: ABNORMAL K/UL (ref 0–0.2)
BASOPHILS NFR BLD: 0 % (ref 0–1.9)
BILIRUB SERPL-MCNC: 0.6 MG/DL (ref 0.1–1)
BUN SERPL-MCNC: 14 MG/DL (ref 6–20)
CALCIUM SERPL-MCNC: 9.4 MG/DL (ref 8.7–10.5)
CHLORIDE SERPL-SCNC: 100 MMOL/L (ref 95–110)
CO2 SERPL-SCNC: 29 MMOL/L (ref 23–29)
CREAT SERPL-MCNC: 0.9 MG/DL (ref 0.5–1.4)
CRP SERPL-MCNC: 11.17 MG/DL
DIFFERENTIAL METHOD: ABNORMAL
EOSINOPHIL # BLD AUTO: ABNORMAL K/UL (ref 0–0.5)
EOSINOPHIL NFR BLD: 3 % (ref 0–8)
ERYTHROCYTE [DISTWIDTH] IN BLOOD BY AUTOMATED COUNT: 14.7 % (ref 11.5–14.5)
EST. GFR  (NO RACE VARIABLE): >60 ML/MIN/1.73 M^2
GLUCOSE SERPL-MCNC: 125 MG/DL (ref 70–110)
GLUCOSE SERPL-MCNC: 135 MG/DL (ref 70–110)
GLUCOSE SERPL-MCNC: 283 MG/DL (ref 70–110)
HCT VFR BLD AUTO: 34.9 % (ref 40–54)
HGB BLD-MCNC: 11.1 G/DL (ref 14–18)
IMM GRANULOCYTES # BLD AUTO: ABNORMAL K/UL (ref 0–0.04)
IMM GRANULOCYTES NFR BLD AUTO: ABNORMAL % (ref 0–0.5)
LYMPHOCYTES # BLD AUTO: ABNORMAL K/UL (ref 1–4.8)
LYMPHOCYTES NFR BLD: 15 % (ref 18–48)
MCH RBC QN AUTO: 29.4 PG (ref 27–31)
MCHC RBC AUTO-ENTMCNC: 31.8 G/DL (ref 32–36)
MCV RBC AUTO: 92 FL (ref 82–98)
MONOCYTES # BLD AUTO: ABNORMAL K/UL (ref 0.3–1)
MONOCYTES NFR BLD: 5 % (ref 4–15)
NEUTROPHILS # BLD AUTO: ABNORMAL K/UL (ref 1.8–7.7)
NEUTROPHILS NFR BLD: 74 % (ref 38–73)
NEUTS BAND NFR BLD MANUAL: 3 %
NRBC BLD-RTO: 0 /100 WBC
PLATELET # BLD AUTO: 396 K/UL (ref 150–450)
PMV BLD AUTO: 9.6 FL (ref 9.2–12.9)
POTASSIUM SERPL-SCNC: 3.8 MMOL/L (ref 3.5–5.1)
PROT SERPL-MCNC: 7.2 G/DL (ref 6–8.4)
RBC # BLD AUTO: 3.78 M/UL (ref 4.6–6.2)
SODIUM SERPL-SCNC: 139 MMOL/L (ref 136–145)
WBC # BLD AUTO: 14.9 K/UL (ref 3.9–12.7)

## 2022-10-20 PROCEDURE — 63600175 PHARM REV CODE 636 W HCPCS: Performed by: SURGERY

## 2022-10-20 PROCEDURE — 86140 C-REACTIVE PROTEIN: CPT | Performed by: INTERNAL MEDICINE

## 2022-10-20 PROCEDURE — 36415 COLL VENOUS BLD VENIPUNCTURE: CPT | Performed by: INTERNAL MEDICINE

## 2022-10-20 PROCEDURE — 85027 COMPLETE CBC AUTOMATED: CPT | Performed by: INTERNAL MEDICINE

## 2022-10-20 PROCEDURE — 25000003 PHARM REV CODE 250: Performed by: INTERNAL MEDICINE

## 2022-10-20 PROCEDURE — 63600175 PHARM REV CODE 636 W HCPCS: Performed by: INTERNAL MEDICINE

## 2022-10-20 PROCEDURE — 80053 COMPREHEN METABOLIC PANEL: CPT | Performed by: INTERNAL MEDICINE

## 2022-10-20 PROCEDURE — 25000003 PHARM REV CODE 250: Performed by: SURGERY

## 2022-10-20 PROCEDURE — 85007 BL SMEAR W/DIFF WBC COUNT: CPT | Performed by: INTERNAL MEDICINE

## 2022-10-20 RX ORDER — HYDROCODONE BITARTRATE AND ACETAMINOPHEN 5; 325 MG/1; MG/1
1 TABLET ORAL EVERY 8 HOURS PRN
Qty: 15 TABLET | Refills: 0 | Status: SHIPPED | OUTPATIENT
Start: 2022-10-20 | End: 2022-10-25

## 2022-10-20 RX ORDER — LINEZOLID 600 MG/1
600 TABLET, FILM COATED ORAL 2 TIMES DAILY
Qty: 14 TABLET | Refills: 0 | Status: SHIPPED | OUTPATIENT
Start: 2022-10-20 | End: 2022-10-27

## 2022-10-20 RX ADMIN — ATENOLOL 100 MG: 50 TABLET ORAL at 09:10

## 2022-10-20 RX ADMIN — DEXTROSE 2 G: 50 INJECTION, SOLUTION INTRAVENOUS at 01:10

## 2022-10-20 RX ADMIN — POTASSIUM CHLORIDE 40 MEQ: 1500 TABLET, EXTENDED RELEASE ORAL at 09:10

## 2022-10-20 RX ADMIN — DEXTROSE 2 G: 50 INJECTION, SOLUTION INTRAVENOUS at 10:10

## 2022-10-20 RX ADMIN — VALSARTAN 320 MG: 80 TABLET, FILM COATED ORAL at 09:10

## 2022-10-20 RX ADMIN — ENOXAPARIN SODIUM 40 MG: 100 INJECTION SUBCUTANEOUS at 09:10

## 2022-10-20 RX ADMIN — CLOPIDOGREL BISULFATE 75 MG: 75 TABLET, FILM COATED ORAL at 09:10

## 2022-10-20 RX ADMIN — PANTOPRAZOLE SODIUM 40 MG: 40 TABLET, DELAYED RELEASE ORAL at 05:10

## 2022-10-20 NOTE — PLAN OF CARE
Patient cleared for discharge from case management standpoint.    DIANDRA contacted Dr England office and spoke with Summer. Office will contact patient with hospital follow up appointment. Patient will self schedule PCP appointment.    Chart and discharge orders reviewed.  Patient discharged home with [facility] home health no further case management needs.         10/20/22 1526   Final Note   Assessment Type Final Discharge Note   Anticipated Discharge Disposition Home-Health   What phone number can be called within the next 1-3 days to see how you are doing after discharge? [phone]   Hospital Resources/Appts/Education Provided Provided patient/caregiver with written discharge plan information   Post-Acute Status   Post-Acute Authorization Home Health   Home Health Status Set-up Complete/Auth obtained   Discharge Delays None known at this time          RT called for ventilator. Dr. Lam plans to intubate.

## 2022-10-20 NOTE — NURSING
57 yr old male  awake and alert  was standing in the room and moved back from bed for the dressing change   Dressing change to the left lower leg   Dressing removed and area cleaned with wound wash  and pat  dry  removed  the loose tissue    Silvadene applied to the raw areas  covered with abd pads and kerlex    To be done daily

## 2022-10-20 NOTE — DISCHARGE SUMMARY
"[facility]  Discharge Summary      Patient Name: Be Shipman  MRN: [id number]  Patient Class: IP- Inpatient  Admission Date: 10/14/2022  Hospital Length of Stay: 6 days  Discharge Date and Time:  10/20/2022 5:12 PM  Attending Physician: Yennifer Bojorquez MD   Discharging Provider: Yennifer Bojorquez MD  Primary Care Provider: Werner Patel NP      HPI:   57 year old male with history of HTN, HLD, Morbid Obesity presented to ED complaining of pain, swelling and redness of LLE X 3 days, progressively worsening in swelling and redness. Is 4-8/10 painful (aching), waxing and waning of own accord, but does worsen with usage of limb, with radiations towards groin. Has been taking "A lot" of ibuprofen for the pain. Was hypotensive (90's) coming through the door, which has responded to volume.    In ED: labs reviewed and noted below: leukocytosis with minimal normocytic anemia; hyponatremia, hypokalemia (treated in ED) with stage 4/5 renal dysfunction (no prior history); lactate is normal. UA: mild protein and hematuria; nitrite neg. Cultured in ED. Tib/Fib films reviewed: no bony path. US leg: no DVT. Telemetry reviewed: sinus    Discussed with ED MD: Inpatient admission; culture as above; volume; hold nephrotoxins (ARB, Tenoretic, NSAID's), prn analgesia; hold hypertensives currently using hydralazine per prn orders instead; broad spectrum antibiotics currently with narrowing pending culture results; Nephrology consultation; A1c, TSH with AM labs for review      Procedure(s) (LRB):  INCISION AND DRAINAGE, LOWER EXTREMITY (Left)      Hospital Course:   Seen and examined   Patient reported diarrheal episodes recently and also was taking ibuprofen for his left leg swelling.   Ultrasound legs negative for DVT. BRENDAN worsen .     10/16  Admitted with severe BRENDAN secondary to gastroenteritis and also with left leg severe cellulitis  Today   Patient seen and examined.  His left leg acutely " worsened with more poorly and tense and spreading cellulitis above knee  CT ordered and no abscess and no gas seen .  D/w Dr Rivas and consulted .  Creatinine back to 1.9    Assumed care of this patient on 10/17/22.  Patient with a history of obesity with BMI 49, ELLIE not using CPAP, diabetes mellitus, hypertension, hyperlipidemia, CAD status post PCI, he presented with 2 day history of worsening left lower extremity pain, swelling and redness, denies any preceding trauma or injury.  States he did have nausea, vomiting and diarrhea which he attributes to gastroenteritis from chicken.  He denies any NSAID use, states he was taking antihistamines at home.  On presentation noted to have leukocytosis with WBC 18, acute kidney injury creatinine 4.7, hypokalemia with potassium 2.6, hyponatremia with sodium 129.  Clinical evidence of cellulitis to left lower extremity, ultrasound no DVT, CT with extensive edema, no soft tissue gas, no foreign body, no definite abscess collection.  He was admitted started on vancomycin and cefepime with Infectious Disease consultation.  Nephrology was also consulted for acute kidney injury, ultrasound no evidence of obstruction, nephrotoxic medications were held, IV fluid hydration.  On 10/17 status post I&D OR by , no purulent see noted, serosanguineous fluid, OR cultures submitted, suspect will have bruising/bloody discharge. Acute kidney injury has resolved. Antibiotics with de-escalated to Ancef.  Local wound care was performed. Blood pressure medications were slowly resumed.  Penrose drain from the leg was pulled on 10/19 by General surgery.  Patient continues to improve, discussed with Infectious Disease and cleared for discharge with ongoing wound care and one week of Zyvox.  Home health was ordered for continue wound care.  Patient will follow-up with primary care provider and Infectious Disease.  On day of discharge WBC count down to 14, CRP 11.  Appears  medically stable for discharge.  Discharge plan including medication, follow-up as well as return precautions were reviewed, he expressed understanding, no questions or concerns.  Discussed with nursing on day of discharge.  Discussed with case management on day of discharge.      Discharge examination   Lying in bed alert, oriented, regular heart rhythm, on room air, dressing to the left lower extremity                       Goals of Care Treatment Preferences:  Code Status: Full Code      Consults:   Consults (From admission, onward)        Status Ordering Provider     Inpatient consult to   Once        Provider:  (Not yet assigned)    Acknowledged FLORESITA FOSTER     Inpatient consult to Infectious Diseases  Once        Provider:  Caitlin Carranza MD    Completed CONCEPCIÓN AGOSTO.     Inpatient consult to General Surgery  Once        Provider:  Kika Rivas Jr., MD    Completed CONCEPCIÓN AGOSTO.     Inpatient consult to Nephrology  Once        Provider:  Prince Good MD    Completed CONCEPCIÓN AGOSTO.     Inpatient consult to Internal Medicine  Once        Provider:  Jeb Osorio MD    Acknowledged KIKA RIVAS JR          No new Assessment & Plan notes have been filed under this hospital service since the last note was generated.  Service: Hospital Medicine    Final Active Diagnoses:    Diagnosis Date Noted POA    PRINCIPAL PROBLEM:  Cellulitis of left lower extremity [L03.116] 10/14/2022 Yes    Leucocytosis [D72.829] 10/17/2022 Yes    Severe obesity (BMI >= 40) [E66.01] 10/17/2022 Yes     Chronic    Type 2 diabetes mellitus, without long-term current use of insulin, HBA1C 6.3% [E11.9] 10/17/2022 Yes     Chronic    ELLIE (obstructive sleep apnea) cannot tolerate cpap [G47.33] 09/11/2021 Yes    S/P coronary artery stent placement - LAD 08/09/2019 [Z95.5] 08/26/2019 Not Applicable    Hyperlipidemia, mixed [E78.2] 05/02/2017 Yes    Essential hypertension, benign [I10] 07/22/2016  "Yes    Alcohol abuse, daily use [F10.10] 07/22/2016 Yes      Problems Resolved During this Admission:    Diagnosis Date Noted Date Resolved POA    BRENDAN (acute kidney injury) [N17.9] 10/17/2022 10/17/2022 Yes    Hyponatremia [E87.1] 10/17/2022 10/17/2022 Yes    Sepsis [A41.9] 10/16/2022 10/17/2022 Yes    Hypokalemia [E87.6] 10/15/2022 10/20/2022 Yes       Discharged Condition: good    [place] [facility] [place]   Follow-up Information     Werner Patel NP. Schedule an appointment as soon as possible for a visit in 1 week(s).    Specialty: Family Medicine  Why: Please call office for an appointment. Let the, know this is a "HOSPITAL FOLLOW UP" Appointment  Contact information:  [address]  [facility] [address]  [phone]             Angelica England MD. Schedule an appointment as soon as possible for a visit in 2 week(s).    Specialty: Infectious Diseases  Why: follow up    OFFICE WILL CALL PATIENT WITH APPOINTMENT DETAILS  Contact information:  [address]  [address]  [place] [address]  [phone]             [facility] Follow up.    Specialties: Home Health Services, Home Therapy Services, Home Living Aide Services  Contact information:  [address] [address]  [phone]                     Patient Instructions:      Ambulatory referral/consult to Home Health   Standing Status: Future   Referral Priority: Routine Referral Type: Home Health   Referral Reason: Specialty Services Required   Requested Specialty: Home Health Services   Number of Visits Requested: 1     Diet diabetic     Notify your health care provider if you experience any of the following:  temperature >100.4     Notify your health care provider if you experience any of the following:  redness, tenderness, or signs of infection (pain, swelling, redness, odor or green/yellow discharge around incision site)     Change dressing (specify)   Order Comments: Dressing " change to the left lower leg   Dressing removed and cleaned with wound wash  Pat dry  applied and covered with 4x4 and abd pads   Wrapped with kerlex and covered with ACE wrapping     Activity as tolerated       Significant Diagnostic Studies: Labs:   BMP:   Recent Labs   Lab 10/20/22  0453   *      K 3.8      CO2 29   BUN 14   CREATININE 0.9   CALCIUM 9.4   , CMP   Recent Labs   Lab 10/20/22  0453      K 3.8      CO2 29   *   BUN 14   CREATININE 0.9   CALCIUM 9.4   PROT 7.2   ALBUMIN 2.6*   BILITOT 0.6   ALKPHOS 178*   AST 67*   ALT 50*   ANIONGAP 10   , CBC   Recent Labs   Lab 10/20/22  0453   WBC 14.90*   HGB 11.1*   HCT 34.9*      , INR   Lab Results   Component Value Date    INR 1.1 09/11/2021   , Lipid Panel   Lab Results   Component Value Date    CHOL 222 (H) 07/07/2022    HDL 41 07/07/2022    LDLCALC 111.4 07/07/2022    TRIG 348 (H) 07/07/2022    CHOLHDL 18.5 (L) 07/07/2022    and Troponin No results for input(s): TROPONINI in the last 168 hours.    Pending Diagnostic Studies:     None       X-Ray Tibia Fibula 2 View Left    Result Date: 10/14/2022  Reason: Leg Swelling with redness and weeping x2 days FINDINGS: 2 views of left tibia and fibula show no fracture, dislocation, or destructive osseous lesion. Diffuse subcutaneous edema is suggested throughout the visualized left lower extremity. Calcaneal enthesophyte arises near plantar fascia origin. IMPRESSION: Diffuse visualized left lower extremity subcutaneous edema suggested without acute bony abnormality. Electronically signed by:  Malik Valentin MD  10/14/2022 4:57 PM CDT Workstation: [id number]    US Lower Extremity Veins Left    Result Date: 10/14/2022  US LOWER EXTREMITY VEINS LIMITED FOLLOW-UP UNILATERAL CLINICAL HISTORY: 57 years Male swelling FINDINGS: Grayscale, color and spectral Doppler analysis of the left lower extremity deep venous system was performed. There is normal compressibility, with normal  flow by color and spectral Doppler analysis in the left lower extremity deep venous system, with normal augmentation and no evidence of deep venous thrombosis. Subcutaneous edema of the left lower extremity. IMPRESSION: Negative for DVT. Electronically signed by:  Jules Snow MD  10/14/2022 6:10 PM CDT Workstation: [id number]    US Kidney    Result Date: 10/15/2022  REASON: alex TECHNIQUE: Grayscale and color Doppler ultrasound of the kidneys. COMPARISON: None. FINDINGS: Right kidney measures 11.9 x 6.4 x 6.5 cm. Left kidney measures 11.6 x 7.6 x 5.7 cm. There is no hydronephrosis or nephrolithiasis. No renal cyst or mass. The bladder is mostly decompressed. Prevoid volume 44.6 mL. Postvoid volume 25.1 mL. IMPRESSION: Unremarkable ultrasound of the kidneys and bladder. Electronically signed by:  Chase Mayo DO  10/15/2022 1:53 PM CDT Workstation: [id number]    CT Leg (Tibia-Fibula) Without Contrast Left    Result Date: 10/16/2022  CMS MANDATED QUALITY DATA - CT RADIATION  436 All CT scans at this facility utilize dose modulation, iterative reconstruction, and/or weight based dosing when appropriate to reduce radiation dose to as low as reasonably achievable. CT LOWER EXTREMITY WITHOUT IV CONTRAST CLINICAL HISTORY: 57 years Male Soft tissue mass, lower leg, US/xray nondiagnostic; Soft tissue mass, lower leg, deep; RAPID  increased in cellulitis with tense calf spreading up to thigh , possible abscess?  nec fascitis ? unable to use contrast COMPARISON: October 14, 2022 radiography FINDINGS: No acute fracture or malalignment of the tibia or fibula. No aggressive osseous lesion. Extensive, severe subcutaneous edema and skin thickening about the left lower extremity. No soft tissue gas. No radiopaque foreign body. No well-defined abscess/drainable fluid collection on this noncontrast exam. Along the posterior medial aspect of the left lower extremity, there is a subcutaneous/dermal lesion measuring 2.3 x 0.8 cm.  IMPRESSION: Severe extensive subcutaneous edema of the left lower extremity. No acute osseous abnormality. Negative for soft tissue gas. Dermal lesion along the posterior medial aspect of the calf. Please correlate with physical exam. Electronically signed by:  Jules Snow MD  10/16/2022 1:58 PM CDT Workstation: [id number]    Medications:  Reconciled Home Medications:      Medication List      START taking these medications    HYDROcodone-acetaminophen 5-325 mg per tablet  Commonly known as: NORCO  Take 1 tablet by mouth every 8 (eight) hours as needed for Pain.     linezolid 600 mg Tab  Commonly known as: ZYVOX  Take 1 tablet (600 mg total) by mouth 2 (two) times a day. for 7 days        CHANGE how you take these medications    metFORMIN 500 MG ER 24hr tablet  Commonly known as: GLUCOPHAGE-XR  TAKE 1 TABLET BY MOUTH EVERY DAY WITH BREAKFAST  What changed: when to take this        CONTINUE taking these medications    allopurinoL 300 MG tablet  Commonly known as: ZYLOPRIM  TAKE 1 TABLET BY MOUTH EVERY DAY     amLODIPine 10 MG tablet  Commonly known as: NORVASC  Take 1 tablet (10 mg total) by mouth once daily.     atenoloL 50 MG tablet  Commonly known as: TENORMIN  Take 50 mg by mouth every evening.     atenoloL-chlorthalidone 100-25 mg per tablet  Commonly known as: TENORETIC  Take 1 tablet by mouth once daily.     atorvastatin 40 MG tablet  Commonly known as: LIPITOR  TAKE 1 TABLET BY MOUTH EVERY DAY     cloNIDine 0.1 MG tablet  Commonly known as: CATAPRES  Take 1 tablet (0.1 mg total) by mouth 2 (two) times daily.     clopidogreL 75 mg tablet  Commonly known as: PLAVIX  TAKE 1 TABLET BY MOUTH EVERY DAY     esomeprazole 40 MG capsule  Commonly known as: NEXIUM  Take 40 mg by mouth before breakfast.     furosemide 20 MG tablet  Commonly known as: LASIX  Take 1 tablet (20 mg total) by mouth once daily.     nitroGLYCERIN 0.4 MG SL tablet  Commonly known as: NITROSTAT  Place 1 tablet (0.4 mg total) under the tongue  every 5 (five) minutes as needed for Chest pain.     olmesartan 40 MG tablet  Commonly known as: BENICAR  Take 1 tablet (40 mg total) by mouth once daily.     potassium chloride SA 20 MEQ tablet  Commonly known as: K-DUR,KLOR-CON  TAKE 1 TABLET BY MOUTH EVERY DAY            Indwelling Lines/Drains at time of discharge:   Lines/Drains/Airways     Drain  Duration                Open Drain 10/17/22 0920 Left Leg Penrose 3 days                Time spent on the discharge of patient: 33 minutes         Yennifer Bojorquez MD  Department of Hospital Medicine  [facility]

## 2022-10-20 NOTE — PROGRESS NOTES
"INPATIENT NEPHROLOGY PROGRESS  [facility] NEPHROLOGY    Be Shipman  10/20/2022    Reason for consultation:    Acute kidney injury    Chief Complaint:   Chief Complaint   Patient presents with    Leg Swelling     LLE swelling and weeping, redness x2 days ago.      History of Present Illness:    Per H and P    57 year old male with history of HTN, HLD, Morbid Obesity presented to ED complaining of pain, swelling and redness of LLE X 3 days, progressively worsening in swelling and redness. Is 4-8/10 painful (aching), waxing and waning of own accord, but does worsen with usage of limb, with radiations towards groin. Has been taking "A lot" of ibuprofen for the pain. Was hypotensive (90's) coming through the door, which has responded to volume.    10/15  consulted for BRENDAN.  Pt had leg pain.  He stated that he had food poisoning starting on Monday and had severe nausea and vomiting and diarrhea all week.  He still has diarrhea.  No nausea.  Appetite is intact.  No confusion.  He denies NSAID use.  He was only taking tylenol for pain  10/16  VSS, renal function improving.  Hypokalemic, got IV repletion.  Feels better, no n/v/d.  10/17 VSS, no new complains.  10/18 VSS, no new complains. S/p left LE I&D yesterday.  10/19 VSS. Will increase KCL today.  10/20 VSS. K is normal, monitor.    Plan of Care:    CKD stage 2  --renal us/bladder us is OK  --rheumatologic serologies given proteinuria and microhematuria, negative  --pt doesn't have stage 5 chronic kidney disease    Hypokalemia  --continue oral KCL  --non-renal diet should help  --Mg is OK  --daily labs while in hospital    Anemia  --iron panel and paraproteins normal  --monitor.    Cellulitis of LLE  --appreciate ID and Surgery input  --I&D on 10/17, no purulence  --on ancef    HTN  -- resume BP meds and monitor.    Thank you for allowing us to participate in this patient's care. We will continue to follow.    Vital Signs:  Temp Readings from Last 3 Encounters: "   10/20/22 98.3 °F (36.8 °C) (Oral)   05/27/22 97.7 °F (36.5 °C)   11/05/21 97.9 °F (36.6 °C)       Pulse Readings from Last 3 Encounters:   10/20/22 74   05/27/22 77   11/05/21 99       BP Readings from Last 3 Encounters:   10/20/22 (!) 145/85   05/27/22 136/84   11/05/21 (!) 136/92       Weight:  Wt Readings from Last 3 Encounters:   10/18/22 (!) 159.2 kg (350 lb 15.6 oz)   05/27/22 (!) 158.3 kg (349 lb)   11/05/21 (!) 156.5 kg (345 lb)       Past Medical & Surgical History:  Past Medical History:   Diagnosis Date    Anticoagulant long-term use     Coronary artery disease     Diabetes mellitus     Hyperlipidemia     Hypertension     Morbid obesity        Past Surgical History:   Procedure Laterality Date    CORONARY STENT PLACEMENT  08/09/2019    pt has card in [facility] Dr. Grimm    HAND SURGERY Left     22 y/o, laceration repair    HERNIA REPAIR      umbilical hernia    INCISION AND DRAINAGE FOOT Right 09/13/2021    Procedure: INCISION AND DRAINAGE, FOOT;  Surgeon: Juan Jose Julio DPM;  Location: [facility] OR;  Service: Podiatry;  Laterality: Right;    left breast tumor removed (benign)       LEFT HEART CATHETERIZATION Left 08/09/2019    Procedure: Left heart cath;  Surgeon: Erik Jessica MD;  Location: [facility] CATH;  Service: Cardiology;  Laterality: Left;       Past Social History:  Social History     Socioeconomic History    Marital status:    Tobacco Use    Smoking status: Former    Smokeless tobacco: Former     Quit date: 7/22/2000    Tobacco comments:     quit 12 years ago   Substance and Sexual Activity    Alcohol use: Yes     Comment: every day 1/2 a fifth of whiskey per day    Drug use: No    Sexual activity: Yes     Social Determinants of Health     Financial Resource Strain: Low Risk     Difficulty of Paying Living Expenses: Not very hard   Food Insecurity: No Food Insecurity    Worried About Running Out of Food in the Last Year: Never true    Ran Out of Food in the Last Year: Never  true   Transportation Needs: No Transportation Needs    Lack of Transportation (Medical): No    Lack of Transportation (Non-Medical): No   Physical Activity: Sufficiently Active    Days of Exercise per Week: 5 days    Minutes of Exercise per Session: 30 min   Stress: No Stress Concern Present    Feeling of Stress : Not at all   Social Connections: Moderately Isolated    Frequency of Communication with Friends and Family: More than three times a week    Frequency of Social Gatherings with Friends and Family: More than three times a week    Attends Catholic Services: Never    Active Member of Clubs or Organizations: No    Attends Club or Organization Meetings: Never    Marital Status:    Housing Stability: Low Risk     Unable to Pay for Housing in the Last Year: No    Number of Places Lived in the Last Year: 1    Unstable Housing in the Last Year: No       Medications:  No current facility-administered medications on file prior to encounter.     Current Outpatient Medications on File Prior to Encounter   Medication Sig Dispense Refill    allopurinoL (ZYLOPRIM) 300 MG tablet TAKE 1 TABLET BY MOUTH EVERY DAY (Patient taking differently: Take 300 mg by mouth once daily.) 90 tablet 0    amLODIPine (NORVASC) 10 MG tablet Take 1 tablet (10 mg total) by mouth once daily. 90 tablet 3    atenoloL (TENORMIN) 50 MG tablet Take 50 mg by mouth every evening.      atenoloL-chlorthalidone (TENORETIC) 100-25 mg per tablet Take 1 tablet by mouth once daily.      atorvastatin (LIPITOR) 40 MG tablet TAKE 1 TABLET BY MOUTH EVERY DAY (Patient taking differently: Take 40 mg by mouth once daily.) 90 tablet 0    cloNIDine (CATAPRES) 0.1 MG tablet Take 1 tablet (0.1 mg total) by mouth 2 (two) times daily. 180 tablet 0    clopidogreL (PLAVIX) 75 mg tablet TAKE 1 TABLET BY MOUTH EVERY DAY (Patient taking differently: Take 75 mg by mouth once daily.) 90 tablet 3    esomeprazole (NEXIUM) 40 MG capsule Take 40 mg by mouth before breakfast.  "     furosemide (LASIX) 20 MG tablet Take 1 tablet (20 mg total) by mouth once daily. 90 tablet 0    olmesartan (BENICAR) 40 MG tablet Take 1 tablet (40 mg total) by mouth once daily. 90 tablet 3    potassium chloride SA (K-DUR,KLOR-CON) 20 MEQ tablet TAKE 1 TABLET BY MOUTH EVERY DAY (Patient taking differently: Take 20 mEq by mouth once daily.) 90 tablet 0    metFORMIN (GLUCOPHAGE-XR) 500 MG ER 24hr tablet TAKE 1 TABLET BY MOUTH EVERY DAY WITH BREAKFAST (Patient taking differently: Take 500 mg by mouth daily with breakfast.) 90 tablet 0    nitroGLYCERIN (NITROSTAT) 0.4 MG SL tablet Place 1 tablet (0.4 mg total) under the tongue every 5 (five) minutes as needed for Chest pain. 25 tablet 2     Scheduled Meds:   atenoloL  100 mg Oral Daily    atenoloL  50 mg Oral QHS    atorvastatin  40 mg Oral QHS    ceFAZolin (ANCEF) IVPB  2 g Intravenous Q8H    clopidogreL  75 mg Oral Daily    enoxaparin  40 mg Subcutaneous Q12H    ferrous sulfate  1 tablet Oral Daily    miconazole   Topical (Top) BID    pantoprazole  40 mg Oral Before breakfast    potassium chloride  40 mEq Oral BID    valsartan  320 mg Oral Daily     Continuous Infusions:      PRN Meds:.acetaminophen, hydrALAZINE, HYDROcodone-acetaminophen, magnesium oxide, magnesium oxide, melatonin, morphine, nitroGLYCERIN, ondansetron, potassium bicarbonate, potassium bicarbonate, potassium bicarbonate, potassium, sodium phosphates, potassium, sodium phosphates, potassium, sodium phosphates, senna-docusate 8.6-50 mg    Allergies:  Tetanus vaccines and toxoid    Past Family History:  Reviewed; refer to Hospitalist Admission Note    Review of Systems:  Review of Systems - All 14 systems reviewed and negative, except as noted in HPI    Physical Exam:    BP (!) 145/85 (BP Location: Right arm)   Pulse 74   Temp 98.3 °F (36.8 °C) (Oral)   Resp 18   Ht 5' 11" (1.803 m)   Wt (!) 159.2 kg (350 lb 15.6 oz)   SpO2 (!) 92%   BMI 48.95 kg/m²     General Appearance:    Alert, " cooperative, no distress, appears stated age   Head:    Normocephalic, without obvious abnormality, atraumatic   Eyes:    PER, conjunctiva/corneas clear, EOM's intact in both eyes        Throat:   Lips, mucosa, and tongue normal; teeth and gums normal   Back:     Symmetric, no curvature, ROM normal, no CVA tenderness   Lungs:     Clear to auscultation bilaterally, respirations unlabored   Chest wall:    No tenderness or deformity   Heart:    Regular rate and rhythm, S1 and S2 normal, no murmur, rub   or gallop   Abdomen:     Soft, non-tender, bowel sounds active all four quadrants,     no masses, no organomegaly   Extremities:   Left leg swollen and red   Pulses:   2+ and symmetric all extremities   MSK:   No joint or muscle swelling, tenderness or deformity   Skin:   Skin color, texture, turgor normal, no rashes or lesions   Neurologic:   CNII-XII intact, normal strength and sensation       Throughout.  No flap     Results:  Recent Labs   Lab 10/17/22  0658 10/18/22  0631 10/20/22  0453    137 139   K 3.2* 3.0* 3.8    96 100   CO2 25 30* 29   BUN 20 13 14   CREATININE 0.8 0.8 0.9   * 129* 125*         Recent Labs   Lab 10/16/22  0259 10/17/22  0658 10/18/22  0631 10/20/22  0453   CALCIUM 8.6* 9.5 9.3 9.4   ALBUMIN 2.6* 2.7*  --  2.6*   PHOS  --   --  3.3  --    MG 1.9 2.0 1.8  --                No results for input(s): POCTGLUCOSE in the last 168 hours.    Recent Labs   Lab 11/05/21  0758 07/07/22  0758 10/15/22  0525   Hemoglobin A1C 6.0 6.7 H 6.3 H         Recent Labs   Lab 10/17/22  0657 10/18/22  0631 10/20/22  0453   WBC 19.97* 20.31* 14.90*   HGB 11.7* 11.8* 11.1*   HCT 36.2* 36.6* 34.9*    372 396   MCV 90 93 92   MCHC 32.3 32.2 31.8*   MONO 6.0  CANCELED  --  5.0  CANCELED         Recent Labs   Lab 10/16/22  0259 10/17/22  0658 10/20/22  0453   BILITOT 1.1* 0.8 0.6   PROT 5.5*  6.7 7.3 7.2   ALBUMIN 2.6* 2.7* 2.6*   ALKPHOS 196* 213* 178*   ALT 62* 68* 50*   AST 97* 75* 67*          Recent Labs   Lab 04/10/21  0738 09/11/21  1612 10/14/22  1649   Color, UA Yellow Yellow Yellow   Appearance, UA  --  Clear Hazy A   Clarity, UA Clear  --   --    pH, UA 6.0 7.0 5.0   Specific Post, UA 1.021 1.015 1.020   Protein, UA Negative Negative 1+ A   Glucose, UA  --  Negative Negative   Ketones, UA Negative Negative Trace A   Urobilinogen, UA 0.2 Negative 2.0-3.0 A   Bilirubin (UA)  --  Negative 1+ A   Occult Blood UA Negative Negative 1+ A   Nitrite, UA Negative Negative Negative   RBC, UA  --  1 7 H   WBC, UA  --  4 32 H   Bacteria  --  Negative Negative   Hyaline Casts, UA  --  1 47 A               Microbiology Results (last 7 days)       Procedure Component Value Units Date/Time    Aerobic culture [[id number]] Collected: 10/17/22 0920    Order Status: Completed Specimen: Wound from Leg, Left Updated: 10/20/22 1037     Aerobic Bacterial Culture No growth    Narrative:      Leg, Left Lower Extremity    Aerobic culture [[id number]] Collected: 10/16/22 1800    Order Status: Completed Specimen: Skin from Leg, Left Updated: 10/20/22 1036     Aerobic Bacterial Culture No growth    Narrative:      bullae    Blood culture #1 **CANNOT BE ORDERED STAT** [[id number]] Collected: 10/14/22 1615    Order Status: Completed Specimen: Blood from Peripheral, Forearm, Left Updated: 10/19/22 1832     Blood Culture, Routine No growth after 5 days.    Blood culture #2 **CANNOT BE ORDERED STAT** [[id number]] Collected: 10/14/22 1643    Order Status: Completed Specimen: Blood from Peripheral, Antecubital, Right Updated: 10/19/22 1832     Blood Culture, Routine No growth after 5 days.    Culture, Anaerobic [[id number]] Collected: 10/16/22 1800    Order Status: Completed Specimen: Skin from Leg, Left Updated: 10/19/22 1605     Anaerobic Culture No anaerobes isolated    Narrative:      Bullae    Culture, Anaerobic [[id number]] Collected: 10/17/22 0920    Order Status: Completed Specimen: Wound from Leg, Left Updated: 10/19/22  1601     Anaerobic Culture No anaerobes isolated    Narrative:      Leg, Left Lower Extremity    Gram stain [[id number]] Collected: 10/17/22 0920    Order Status: Completed Specimen: Wound from Leg, Left Updated: 10/17/22 1405     Gram Stain Result No WBC's      No organisms seen    Narrative:      Leg, Left Lower Extremity    Fungus culture [[id number]] Collected: 10/17/22 0920    Order Status: Sent Specimen: Wound from Leg, Left Updated: 10/17/22 0956    AFB Culture & Smear [[id number]] Collected: 10/17/22 0920    Order Status: Sent Specimen: Wound from Leg, Left Updated: 10/17/22 0956    Urine culture [[id number]] Collected: 10/14/22 1649    Order Status: Completed Specimen: Urine Updated: 10/16/22 0635     Urine Culture, Routine Multiple organisms isolated. None in predominance.  Repeat if      clinically necessary.    Narrative:      Specimen Source->Urine           Patient care was time spent personally by me on the following activities:   Obtaining a history  Examination of patient.  Providing medical care at the patients bedside.  Developing a treatment plan with patient or surrogate and bedside caregivers  Ordering and reviewing laboratory studies, radiographic studies, pulse oximetry.  Ordering and performing treatments and interventions.  Evaluation of patient's response to treatment.  Discussions with consultants while on the unit and immediately available to the patient.  Re-evaluation of the patient's condition.  Documentation in the medical record.     Prince Good MD  Nephrology  [facility]  [phone]

## 2022-10-21 LAB
GRAM STN SPEC: NORMAL
GRAM STN SPEC: NORMAL

## 2022-10-21 PROCEDURE — G0180 MD CERTIFICATION HHA PATIENT: HCPCS | Mod: ,,, | Performed by: SURGERY

## 2022-10-21 PROCEDURE — G0180 PR HOME HEALTH MD CERTIFICATION: ICD-10-PCS | Mod: ,,, | Performed by: SURGERY

## 2022-10-24 ENCOUNTER — PATIENT OUTREACH (OUTPATIENT)
Dept: FAMILY MEDICINE | Facility: CLINIC | Age: 57
End: 2022-10-24

## 2022-10-24 NOTE — TELEPHONE ENCOUNTER
Discharge Information     Discharge Date:   10/20/2022     Primary Discharge Diagnosis:  Cellulitis of Left Lower Extremity      Discharge Summary:  Reviewed      Medication & Order Review     Were medication changes made or new medications added?   Yes    If so, has the patient filled the prescriptions?  Yes     Was Home Health ordered? Yes    If so, has Home Health contacted patient and/or initiated services?  Yes    Name of Home Health Agency? N/A    Durable Medical Equipment ordered?  No     If so, has the DME provider contacted patient and delivered equipment?   N/A     Follow Up               Any problems since discharge? No    How is the patient feeling since returning home?    Pt stated that he is feeling fine and is not having any complications.  Have you set up recommended follow up appointments?  Pt has appointment tomorrow morning with infectious disease tomorrow morning at 9 am.    Schedule Hospital Follow-up appointment within 7-14 days (preferably 7).      Notes:  Offered to schedule hospital follow up, but pt said he will call tomorrow after his appointment with infectious disease.            Dawna Costello

## 2022-10-25 ENCOUNTER — OFFICE VISIT (OUTPATIENT)
Dept: INFECTIOUS DISEASES | Facility: CLINIC | Age: 57
End: 2022-10-25
Payer: COMMERCIAL

## 2022-10-25 VITALS
DIASTOLIC BLOOD PRESSURE: 82 MMHG | TEMPERATURE: 98 F | HEART RATE: 75 BPM | SYSTOLIC BLOOD PRESSURE: 136 MMHG | OXYGEN SATURATION: 96 % | BODY MASS INDEX: 44.1 KG/M2 | HEIGHT: 71 IN | WEIGHT: 315 LBS

## 2022-10-25 DIAGNOSIS — B95.5 STREPTOCOCCAL CELLULITIS: ICD-10-CM

## 2022-10-25 DIAGNOSIS — L03.116 CELLULITIS OF LEFT LOWER EXTREMITY: Primary | ICD-10-CM

## 2022-10-25 DIAGNOSIS — E11.9 DIABETES MELLITUS TYPE 2, NONINSULIN DEPENDENT: ICD-10-CM

## 2022-10-25 DIAGNOSIS — L03.90 STREPTOCOCCAL CELLULITIS: ICD-10-CM

## 2022-10-25 PROCEDURE — 1111F DSCHRG MED/CURRENT MED MERGE: CPT | Mod: CPTII,S$GLB,, | Performed by: INTERNAL MEDICINE

## 2022-10-25 PROCEDURE — 4010F PR ACE/ARB THEARPY RXD/TAKEN: ICD-10-PCS | Mod: CPTII,S$GLB,, | Performed by: INTERNAL MEDICINE

## 2022-10-25 PROCEDURE — 3075F PR MOST RECENT SYSTOLIC BLOOD PRESS GE 130-139MM HG: ICD-10-PCS | Mod: CPTII,S$GLB,, | Performed by: INTERNAL MEDICINE

## 2022-10-25 PROCEDURE — 3060F PR POS MICROALBUMINURIA RESULT DOCUMENTED/REVIEW: ICD-10-PCS | Mod: CPTII,S$GLB,, | Performed by: INTERNAL MEDICINE

## 2022-10-25 PROCEDURE — 4010F ACE/ARB THERAPY RXD/TAKEN: CPT | Mod: CPTII,S$GLB,, | Performed by: INTERNAL MEDICINE

## 2022-10-25 PROCEDURE — 1160F PR REVIEW ALL MEDS BY PRESCRIBER/CLIN PHARMACIST DOCUMENTED: ICD-10-PCS | Mod: CPTII,S$GLB,, | Performed by: INTERNAL MEDICINE

## 2022-10-25 PROCEDURE — 1111F PR DISCHARGE MEDS RECONCILED W/ CURRENT OUTPATIENT MED LIST: ICD-10-PCS | Mod: CPTII,S$GLB,, | Performed by: INTERNAL MEDICINE

## 2022-10-25 PROCEDURE — 99213 OFFICE O/P EST LOW 20 MIN: CPT | Mod: S$GLB,,, | Performed by: INTERNAL MEDICINE

## 2022-10-25 PROCEDURE — 3066F PR DOCUMENTATION OF TREATMENT FOR NEPHROPATHY: ICD-10-PCS | Mod: CPTII,S$GLB,, | Performed by: INTERNAL MEDICINE

## 2022-10-25 PROCEDURE — 1159F PR MEDICATION LIST DOCUMENTED IN MEDICAL RECORD: ICD-10-PCS | Mod: CPTII,S$GLB,, | Performed by: INTERNAL MEDICINE

## 2022-10-25 PROCEDURE — 3044F HG A1C LEVEL LT 7.0%: CPT | Mod: CPTII,S$GLB,, | Performed by: INTERNAL MEDICINE

## 2022-10-25 PROCEDURE — 3079F PR MOST RECENT DIASTOLIC BLOOD PRESSURE 80-89 MM HG: ICD-10-PCS | Mod: CPTII,S$GLB,, | Performed by: INTERNAL MEDICINE

## 2022-10-25 PROCEDURE — 3079F DIAST BP 80-89 MM HG: CPT | Mod: CPTII,S$GLB,, | Performed by: INTERNAL MEDICINE

## 2022-10-25 PROCEDURE — 3044F PR MOST RECENT HEMOGLOBIN A1C LEVEL <7.0%: ICD-10-PCS | Mod: CPTII,S$GLB,, | Performed by: INTERNAL MEDICINE

## 2022-10-25 PROCEDURE — 1160F RVW MEDS BY RX/DR IN RCRD: CPT | Mod: CPTII,S$GLB,, | Performed by: INTERNAL MEDICINE

## 2022-10-25 PROCEDURE — 3066F NEPHROPATHY DOC TX: CPT | Mod: CPTII,S$GLB,, | Performed by: INTERNAL MEDICINE

## 2022-10-25 PROCEDURE — 1159F MED LIST DOCD IN RCRD: CPT | Mod: CPTII,S$GLB,, | Performed by: INTERNAL MEDICINE

## 2022-10-25 PROCEDURE — 3060F POS MICROALBUMINURIA REV: CPT | Mod: CPTII,S$GLB,, | Performed by: INTERNAL MEDICINE

## 2022-10-25 PROCEDURE — 3075F SYST BP GE 130 - 139MM HG: CPT | Mod: CPTII,S$GLB,, | Performed by: INTERNAL MEDICINE

## 2022-10-25 PROCEDURE — 99213 PR OFFICE/OUTPT VISIT, EST, LEVL III, 20-29 MIN: ICD-10-PCS | Mod: S$GLB,,, | Performed by: INTERNAL MEDICINE

## 2022-10-25 NOTE — PROGRESS NOTES
Subjective:       Patient ID: Be Shipman is a 57 y.o. male.    Chief Complaint:: hospital follow up visit     HPI seen at [facility] for severe left leg cellulitis(STrep presentation). He was taken to OR to look for abscesses but there were none. Blood cultures were negative. On presentation, he was dehydrated from vomiting and diarrhea, which he blamed on some old chicken. All of the above improved and he was discharged on zyvox. He is doing very well, wrapping his leg with ACE to control edema. He still has some discomfort in  the left leg and we discussed return to work.     Review of patient's allergies indicates:   Allergen Reactions    Tetanus vaccines and toxoid Anaphylaxis, Hives and Rash     Past Medical History:   Diagnosis Date    Anticoagulant long-term use     Cellulitis of left leg 10/2022    Coronary artery disease     Diabetes mellitus     Hyperlipidemia     Hypertension     Morbid obesity      Past Surgical History:   Procedure Laterality Date    CORONARY STENT PLACEMENT  08/09/2019    pt has card in [facility] Dr. Grimm    HAND SURGERY Left     20 y/o, laceration repair    HERNIA REPAIR      umbilical hernia    INCISION AND DRAINAGE FOOT Right 09/13/2021    Procedure: INCISION AND DRAINAGE, FOOT;  Surgeon: Juan Jose Julio DPM;  Location: [facility] OR;  Service: Podiatry;  Laterality: Right;    left breast tumor removed (benign)       LEFT HEART CATHETERIZATION Left 08/09/2019    Procedure: Left heart cath;  Surgeon: Erik Jessica MD;  Location: [facility] CATH;  Service: Cardiology;  Laterality: Left;     Social History     Tobacco Use    Smoking status: Former    Smokeless tobacco: Former     Quit date: 7/22/2000    Tobacco comments:     quit 12 years ago   Substance Use Topics    Alcohol use: Yes     Comment: every day 1/2 a fifth of whiskey per day        Family History   Problem Relation Age of Onset    Heart disease Mother         CAD    Hypertension Mother     Cancer Sister         breast  "   Heart disease Sister 50    Heart disease Brother         MI    Stroke Brother     Heart disease Brother         MI    Heart disease Brother         MI         Review of Systems    Constitutional: No fever, chills, sweats, fatigue, weakness, weight loss    Eyes: No change in vision,      ENT: No sore throat, mouth pains, or lesions    Cardiovascular: No chest pain,      Respiratory: No shortness of breath,      Gastrointestinal: No abdominal pain, nausea, vomiting, diarrhea,      Genitourinary: No dysuria,      Musculoskeletal: No new pain, joint swelling, or injuries    Integumentary: No new rashes,     Neurological: No dizziness, vertigo, unusual headaches,  falls    Psychiatric: No anxiety, depression    Endocrine:      Lymphatic: No lymphadenopathy, blood loss, anemia, malignancy    VAD:     Objective:      Blood pressure 136/82, pulse 75, temperature 97.7 °F (36.5 °C), height 5' 11" (1.803 m), weight (!) 152.7 kg (336 lb 11.2 oz), SpO2 96 %. Body mass index is 46.96 kg/m².  Physical Exam      General: Alert and attentive, cooperative and in no distress    Eyes:   anicteric, EOMI    Neck: Supple,      ENT: EAC patent, nares patent, no oral lesions, teeth in good condition, no thrush    Cardiovascular:      Respiratory:      Gastrointestinal:       Genitourinary:      Integumentary: Skin without rashes     Vascular: left greater than right edema, secondary to cellulitis    Musculoskeletal: Ambulates with minimal difficulty, no acute arthritis, synovitis or myositis. Normal muscle bulk and strength    Lymphatic:      Neurological: Normal LOC, cranial nerves, speech,  , gait    Psychiatric: Normal mood, speech,  demeanor     Wound: left leg cellulitis is much improved, swelling decreased, weeping areas are now almost dry. No focal abscess. Cleaned and redressed/wrapped with ACE    VAD:        Recent Diagnostics:          Assessment and Plan:           Cellulitis of left lower extremity    Diabetes mellitus type " 2, noninsulin dependent    Streptococcal cellulitis    Continue to clean the broken skin on your leg daily with chlorhexidine(hibiclens)  Keep the compression wrap or compression stockings on when you are not in bed   Finish the antibiotics that you have  Walking is good, elevation when sitting is important    Note for work: return  10/31 , light duty x 10days    This note was created using Dragon voice recognition software that occasionally misinterpreted phrases or words.

## 2022-10-25 NOTE — PATIENT INSTRUCTIONS
Continue to clean the broken skin on your leg daily with chlorhexidine(hibiclens)  Keep the compression wrap or compression stockings on when you are not in bed   Finish the antibiotics that you have  Walking is good, elevation when sitting is important    Note for work: return  10/31 , light duty x 10days

## 2022-10-26 NOTE — PHYSICIAN QUERY
PT Name: Be Shipman  MR #: [id number]     DOCUMENTATION CLARIFICATION      CDS/: Any Becerra               Contact information: [phone]    This form is a permanent document in the medical record.     Query Date: October 26, 2022    Dear Provider,  By submitting this query, we are merely seeking further clarification of documentation.  Please utilize your independent clinical judgment when addressing the question(s) below.     The Medical Record contains the following:    Supporting Clinical Findings Location in Medical Record   Sepsis    Sepsis (listed in Active Problem List only) ER Phys Note    DS and Dr. Rivas's Consult 10/17   Left lower extremity cellulitis, BRENDAN/ATN    Patient has worsening leukocytosis and spreading erythema    Incision and Drainage of Left lower extremity with Penrose drain placement H&P, Consults, Progress Notes, DS    Dr. Rivas's Consult 10/17    OP Note 10/17   WBC 10/14 - 18.73            10/15 - 19.19            10/16 - 17.32            10/17 - 19.97            10/18 - 20.31            10/20 - 14.90    CRP  10/17 - 22.13            10/20 - 11.17    I&D cultures from LLE - no growth to date Epic Labs     Please clarify if the SEPSIS diagnosis has been:    [  ] Ruled In   [X  ] Ruled Out   [  ] Other/Clarification of findings (please specify):            IF SEPSIS is RULED IN - Please clarify the status of Present on Admission (POA) for this diagnosis:    [  ] Yes (Y)   [  ] No (N)   [  ] Documentation insufficient to determine if condition is POA (U)

## 2022-10-31 ENCOUNTER — EXTERNAL HOME HEALTH (OUTPATIENT)
Dept: HOME HEALTH SERVICES | Facility: HOSPITAL | Age: 57
End: 2022-10-31
Payer: COMMERCIAL

## 2022-11-07 ENCOUNTER — TELEPHONE (OUTPATIENT)
Dept: INFECTIOUS DISEASES | Facility: CLINIC | Age: 57
End: 2022-11-07

## 2022-11-07 NOTE — TELEPHONE ENCOUNTER
I spoke with patient to advise his RTW note is ready  Patient stated he will pick it up tomorrow  (11/08/22)  KADE WILBURN  11/07/22

## 2022-11-07 NOTE — TELEPHONE ENCOUNTER
Patient called  [phone]    Patient called requesting a FULL release letter  With NO restrictions ; To return to work .    Please advise     KADE s  CCMA  11/07/22

## 2022-11-14 LAB — FUNGUS SPEC CULT: NORMAL

## 2022-12-01 LAB
ACID FAST MOD KINY STN SPEC: NORMAL
MYCOBACTERIUM SPEC QL CULT: NORMAL

## 2023-01-25 NOTE — PROGRESS NOTES
SUBJECTIVE:      Patient ID: Be Shipman is a 57 y.o. male.    Chief Complaint: Hypertension    Mr Shipman is here today to f/u on hyperlipidemia, htn, dm and review labs. He is following with cardiology Dr Rust. Saw him 2 weeks ago and started on new bp meds. Due for routine labs Was following with Dr England for left lower leg cellulitis. Since then he has a hard tender area on his left posterior calf. He also says he has a left breast mass he noticed over a month ago. Says he had one in his right breast a few years ago and was a fatty tumor he had removed. Declines colonoscopy or cologuard    Hypertension  This is a chronic problem. The current episode started more than 1 year ago. The problem is unchanged. The problem is controlled. Associated symptoms include peripheral edema. Pertinent negatives include no anxiety, chest pain, headaches, neck pain, palpitations or shortness of breath. Risk factors for coronary artery disease include male gender, obesity and dyslipidemia. Past treatments include calcium channel blockers, beta blockers, diuretics and angiotensin blockers. The current treatment provides moderate improvement.   Hyperlipidemia  This is a chronic problem. The current episode started more than 1 year ago. The problem is controlled. Recent lipid tests were reviewed and are normal. Associated symptoms include myalgias. Pertinent negatives include no chest pain or shortness of breath. Current antihyperlipidemic treatment includes statins.   Diabetes  He presents for his follow-up diabetic visit. He has type 2 diabetes mellitus. His disease course has been stable. There are no hypoglycemic associated symptoms. Pertinent negatives for hypoglycemia include no confusion, headaches, nervousness/anxiousness, pallor, seizures or speech difficulty. There are no diabetic associated symptoms. Pertinent negatives for diabetes include no chest pain, no polyphagia and no weakness. There are no  hypoglycemic complications. Symptoms are stable. There are no diabetic complications. Risk factors for coronary artery disease include diabetes mellitus, dyslipidemia, male sex, hypertension, obesity and tobacco exposure. He is compliant with treatment most of the time. When asked about meal planning, he reported none. An ACE inhibitor/angiotensin II receptor blocker is being taken. He does not see a podiatrist.Eye exam is not current.     Past Surgical History:   Procedure Laterality Date    CORONARY STENT PLACEMENT  08/09/2019    pt has card in [place], [facility] Dr. Grimm    HAND SURGERY Left     20 y/o, laceration repair    HERNIA REPAIR      umbilical hernia    INCISION AND DRAINAGE FOOT Right 09/13/2021    Procedure: INCISION AND DRAINAGE, FOOT;  Surgeon: Juan Jose Julio DPM;  Location: [facility] OR;  Service: Podiatry;  Laterality: Right;    left breast tumor removed (benign)       LEFT HEART CATHETERIZATION Left 08/09/2019    Procedure: Left heart cath;  Surgeon: Erik Jessica MD;  Location: [facility] CATH;  Service: Cardiology;  Laterality: Left;    LEG SURGERY Left 10/2022     Family History   Problem Relation Age of Onset    Heart disease Mother         CAD    Hypertension Mother     Cancer Sister         breast    Heart disease Sister 50    Heart disease Brother         MI    Stroke Brother     Heart disease Brother         MI    Heart disease Brother         MI      Social History     Socioeconomic History    Marital status:    Tobacco Use    Smoking status: Former    Smokeless tobacco: Former     Quit date: 7/22/2000    Tobacco comments:     quit 12 years ago   Substance and Sexual Activity    Alcohol use: Yes     Comment: every day 1/2 a fifth of whiskey per day    Drug use: No    Sexual activity: Yes     Social Determinants of Health     Financial Resource Strain: Low Risk     Difficulty of Paying Living Expenses: Not very hard   Food Insecurity: No Food Insecurity    Worried About Running Out  of Food in the Last Year: Never true    Ran Out of Food in the Last Year: Never true   Transportation Needs: No Transportation Needs    Lack of Transportation (Medical): No    Lack of Transportation (Non-Medical): No   Physical Activity: Sufficiently Active    Days of Exercise per Week: 5 days    Minutes of Exercise per Session: 30 min   Stress: No Stress Concern Present    Feeling of Stress : Not at all   Social Connections: Moderately Isolated    Frequency of Communication with Friends and Family: More than three times a week    Frequency of Social Gatherings with Friends and Family: More than three times a week    Attends Faith Services: Never    Active Member of Clubs or Organizations: No    Attends Club or Organization Meetings: Never    Marital Status:    Housing Stability: Low Risk     Unable to Pay for Housing in the Last Year: No    Number of Places Lived in the Last Year: 1    Unstable Housing in the Last Year: No     Current Outpatient Medications   Medication Sig Dispense Refill    allopurinoL (ZYLOPRIM) 300 MG tablet TAKE 1 TABLET BY MOUTH EVERY DAY 90 tablet 0    amLODIPine (NORVASC) 10 MG tablet Take 1 tablet (10 mg total) by mouth once daily. 90 tablet 3    atenoloL (TENORMIN) 50 MG tablet Take 50 mg by mouth every evening.      atenoloL-chlorthalidone (TENORETIC) 100-25 mg per tablet Take 1 tablet by mouth once daily.      atorvastatin (LIPITOR) 40 MG tablet TAKE 1 TABLET BY MOUTH EVERY DAY 90 tablet 0    cloNIDine (CATAPRES) 0.1 MG tablet Take 1 tablet (0.1 mg total) by mouth 2 (two) times daily. 180 tablet 0    clopidogreL (PLAVIX) 75 mg tablet TAKE 1 TABLET BY MOUTH EVERY DAY (Patient taking differently: Take 75 mg by mouth once daily.) 90 tablet 3    esomeprazole (NEXIUM) 40 MG capsule Take 40 mg by mouth before breakfast.      furosemide (LASIX) 20 MG tablet Take 1 tablet (20 mg total) by mouth once daily. 90 tablet 0    nitroGLYCERIN (NITROSTAT) 0.4 MG SL tablet Place 1 tablet (0.4  mg total) under the tongue every 5 (five) minutes as needed for Chest pain. 25 tablet 2    olmesartan (BENICAR) 40 MG tablet Take 1 tablet (40 mg total) by mouth once daily. 90 tablet 3    potassium chloride SA (K-DUR,KLOR-CON) 20 MEQ tablet TAKE 1 TABLET BY MOUTH EVERY DAY 90 tablet 0     No current facility-administered medications for this visit.     Review of patient's allergies indicates:   Allergen Reactions    Tetanus vaccines and toxoid Anaphylaxis, Hives and Rash      Past Medical History:   Diagnosis Date    Anticoagulant long-term use     Cellulitis of left leg 10/2022    Coronary artery disease     Diabetes mellitus     Hyperlipidemia     Hypertension     Morbid obesity      Past Surgical History:   Procedure Laterality Date    CORONARY STENT PLACEMENT  [place]09[place]2019    [place] [facility] [facility]    HAND SURGERY Left     20 y/o, laceration repair    HERNIA REPAIR      umbilical hernia    INCISION AND DRAINAGE FOOT Right 09/13/2021    Procedure: INCISION AND DRAINAGE, FOOT;  Surgeon: Juan Jose Julio DPM;  Location: [facility] OR;  Service: Podiatry;  Laterality: Right;    left breast tumor removed (benign)       LEFT HEART CATHETERIZATION Left 08/09/2019    Procedure: Left heart cath;  Surgeon: Erik Jessica MD;  Location: [facility] CATH;  Service: Cardiology;  Laterality: Left;    LEG SURGERY Left 10/2022       Review of Systems   Constitutional:  Negative for appetite change, chills, diaphoresis and unexpected weight change.   HENT:  Negative for ear discharge, facial swelling, hearing loss, nosebleeds and trouble swallowing.    Eyes:  Negative for photophobia, pain and visual disturbance.   Respiratory:  Negative for apnea, choking and shortness of breath.    Cardiovascular:  Negative for chest pain and palpitations.   Gastrointestinal:  Negative for abdominal pain, blood in stool and vomiting.   Endocrine: Negative for polyphagia.   Genitourinary:  Negative for difficulty urinating and  "hematuria.   Musculoskeletal:  Positive for arthralgias and myalgias. Negative for gait problem, joint swelling and neck pain.   Skin:  Negative for pallor.   Neurological:  Negative for seizures, speech difficulty, weakness and headaches.   Hematological:  Does not bruise/bleed easily.   Psychiatric/Behavioral:  Negative for agitation, confusion, self-injury, sleep disturbance and suicidal ideas. The patient is not nervous/anxious.     OBJECTIVE:      Vitals:    01/26/23 0903 01/26/23 0936   BP: (!) 150/80 136/82   Pulse: 85    Temp: 98.1 °F (36.7 °C)    SpO2: 96%    Weight: (!) 153.8 kg (339 lb)    Height: 5' 11" (1.803 m)      Physical Exam  Vitals and nursing note reviewed.   Constitutional:       General: He is not in acute distress.     Appearance: He is well-developed.   HENT:      Head: Normocephalic and atraumatic.      Nose: Nose normal.      Mouth/Throat:      Pharynx: Uvula midline.   Eyes:      General: Lids are normal.      Conjunctiva/sclera: Conjunctivae normal.      Pupils: Pupils are equal, round, and reactive to light.      Right eye: Pupil is round and reactive.      Left eye: Pupil is round and reactive.   Neck:      Thyroid: No thyromegaly.      Vascular: No carotid bruit.   Cardiovascular:      Rate and Rhythm: Normal rate and regular rhythm.      Pulses:           Dorsalis pedis pulses are 2+ on the right side and 1+ on the left side.        Posterior tibial pulses are 2+ on the right side and 1+ on the left side.      Heart sounds: Normal heart sounds.   Pulmonary:      Effort: Pulmonary effort is normal.      Breath sounds: Normal breath sounds. No wheezing, rhonchi or rales.   Chest:   Breasts:     Breasts are asymmetrical.      Left: Mass present. No swelling, bleeding, inverted nipple, nipple discharge, skin change or tenderness.       Abdominal:      General: Bowel sounds are normal.      Palpations: Abdomen is soft. Abdomen is not rigid.      Tenderness: There is no abdominal " tenderness.   Musculoskeletal:         General: Normal range of motion.      Cervical back: Normal range of motion and neck supple.      Right lower leg: No edema.      Left lower leg: No edema.   Lymphadenopathy:      Cervical: No cervical adenopathy.      Upper Body:      Left upper body: No supraclavicular, axillary or pectoral adenopathy.   Skin:     General: Skin is warm and dry.      Nails: There is no clubbing.      Comments: Left lower leg with minimal erythema, swelling noted, posterior calf firm and tender, no palpable mass   Neurological:      Mental Status: He is alert and oriented to person, place, and time.   Psychiatric:         Mood and Affect: Mood normal.         Speech: Speech normal.         Behavior: Behavior normal. Behavior is cooperative.         Thought Content: Thought content normal.      Assessment:       1. Type 2 diabetes mellitus treated without insulin    2. Essential hypertension    3. Hyperlipidemia, mixed    4. Preventative health care    5. Pain and swelling of lower leg, left    6. Subareolar mass of left breast        Plan:       Type 2 diabetes mellitus treated without insulin  -     Hemoglobin A1C; Future; Expected date: 02/09/2023  -     Microalbumin/Creatinine Ratio, Urine; Future; Expected date: 02/02/2023  -     Ambulatory referral/consult to Podiatry; Future; Expected date: 02/02/2023    Essential hypertension  Stable on current meds  Cont f/u withhDr Rust    Hyperlipidemia, mixed  Stable    Preventative health care  -     CBC Auto Differential; Future; Expected date: 02/09/2023  -     Comprehensive Metabolic Panel; Future; Expected date: 02/09/2023  -     Lipid Panel; Future; Expected date: 02/09/2023  -     PSA, Screening; Future; Expected date: 02/09/2023  -     Urinalysis; Future; Expected date: 02/09/2023    Pain and swelling of lower leg, left  -     US Extremity Non Vascular Complete Left; Future; Expected date: 01/26/2023  -     US Lower Extremity Veins Left;  Future; Expected date: 01/26/2023  Compression stockings, elevation when possible. Will call with u/s report    Subareolar mass of left breast  -     Mammo Digital Diagnostic Left; Future; Expected date: 01/26/2023    Will call with mammo results. He would like a referral to a surgeon for removal    Follow up in about 6 months (around 7/26/2023), or if symptoms worsen or fail to improve, for htn.      1/26/2023 SHELBI Maciel, FNP

## 2023-01-26 ENCOUNTER — OFFICE VISIT (OUTPATIENT)
Dept: FAMILY MEDICINE | Facility: CLINIC | Age: 58
End: 2023-01-26
Payer: COMMERCIAL

## 2023-01-26 ENCOUNTER — TELEPHONE (OUTPATIENT)
Dept: FAMILY MEDICINE | Facility: CLINIC | Age: 58
End: 2023-01-26
Payer: COMMERCIAL

## 2023-01-26 VITALS
HEART RATE: 85 BPM | OXYGEN SATURATION: 96 % | HEIGHT: 71 IN | WEIGHT: 315 LBS | SYSTOLIC BLOOD PRESSURE: 136 MMHG | BODY MASS INDEX: 44.1 KG/M2 | DIASTOLIC BLOOD PRESSURE: 82 MMHG | TEMPERATURE: 98 F

## 2023-01-26 DIAGNOSIS — N63.42 SUBAREOLAR MASS OF LEFT BREAST: Primary | ICD-10-CM

## 2023-01-26 DIAGNOSIS — Z00.00 PREVENTATIVE HEALTH CARE: ICD-10-CM

## 2023-01-26 DIAGNOSIS — I10 ESSENTIAL HYPERTENSION: ICD-10-CM

## 2023-01-26 DIAGNOSIS — M79.89 PAIN AND SWELLING OF LOWER LEG, LEFT: ICD-10-CM

## 2023-01-26 DIAGNOSIS — E11.9 TYPE 2 DIABETES MELLITUS TREATED WITHOUT INSULIN: Primary | ICD-10-CM

## 2023-01-26 DIAGNOSIS — E78.2 HYPERLIPIDEMIA, MIXED: ICD-10-CM

## 2023-01-26 DIAGNOSIS — N63.42 SUBAREOLAR MASS OF LEFT BREAST: ICD-10-CM

## 2023-01-26 DIAGNOSIS — M79.662 PAIN AND SWELLING OF LOWER LEG, LEFT: ICD-10-CM

## 2023-01-26 PROCEDURE — 3079F DIAST BP 80-89 MM HG: CPT | Mod: CPTII,S$GLB,, | Performed by: NURSE PRACTITIONER

## 2023-01-26 PROCEDURE — 99214 PR OFFICE/OUTPT VISIT, EST, LEVL IV, 30-39 MIN: ICD-10-PCS | Mod: S$GLB,,, | Performed by: NURSE PRACTITIONER

## 2023-01-26 PROCEDURE — 3008F BODY MASS INDEX DOCD: CPT | Mod: CPTII,S$GLB,, | Performed by: NURSE PRACTITIONER

## 2023-01-26 PROCEDURE — 1159F PR MEDICATION LIST DOCUMENTED IN MEDICAL RECORD: ICD-10-PCS | Mod: CPTII,S$GLB,, | Performed by: NURSE PRACTITIONER

## 2023-01-26 PROCEDURE — 3075F PR MOST RECENT SYSTOLIC BLOOD PRESS GE 130-139MM HG: ICD-10-PCS | Mod: CPTII,S$GLB,, | Performed by: NURSE PRACTITIONER

## 2023-01-26 PROCEDURE — 3075F SYST BP GE 130 - 139MM HG: CPT | Mod: CPTII,S$GLB,, | Performed by: NURSE PRACTITIONER

## 2023-01-26 PROCEDURE — 1159F MED LIST DOCD IN RCRD: CPT | Mod: CPTII,S$GLB,, | Performed by: NURSE PRACTITIONER

## 2023-01-26 PROCEDURE — 99214 OFFICE O/P EST MOD 30 MIN: CPT | Mod: S$GLB,,, | Performed by: NURSE PRACTITIONER

## 2023-01-26 PROCEDURE — 1160F PR REVIEW ALL MEDS BY PRESCRIBER/CLIN PHARMACIST DOCUMENTED: ICD-10-PCS | Mod: CPTII,S$GLB,, | Performed by: NURSE PRACTITIONER

## 2023-01-26 PROCEDURE — 1160F RVW MEDS BY RX/DR IN RCRD: CPT | Mod: CPTII,S$GLB,, | Performed by: NURSE PRACTITIONER

## 2023-01-26 PROCEDURE — 3008F PR BODY MASS INDEX (BMI) DOCUMENTED: ICD-10-PCS | Mod: CPTII,S$GLB,, | Performed by: NURSE PRACTITIONER

## 2023-01-26 PROCEDURE — 3079F PR MOST RECENT DIASTOLIC BLOOD PRESSURE 80-89 MM HG: ICD-10-PCS | Mod: CPTII,S$GLB,, | Performed by: NURSE PRACTITIONER

## 2023-01-26 NOTE — TELEPHONE ENCOUNTER
"[facility] referral: DEFERRED 30; spoke with patient. He will schedule appointment after doing radiology tests. Says he's "in no hurry. Could be next month". Scheduling information via portal.  "

## 2023-01-27 ENCOUNTER — TELEPHONE (OUTPATIENT)
Dept: FAMILY MEDICINE | Facility: CLINIC | Age: 58
End: 2023-01-27
Payer: COMMERCIAL

## 2023-01-30 ENCOUNTER — TELEPHONE (OUTPATIENT)
Dept: FAMILY MEDICINE | Facility: CLINIC | Age: 58
End: 2023-01-30
Payer: COMMERCIAL

## 2023-02-02 ENCOUNTER — HOSPITAL ENCOUNTER (OUTPATIENT)
Dept: RADIOLOGY | Facility: CLINIC | Age: 58
Discharge: HOME | End: 2023-02-02
Attending: PODIATRIST
Payer: COMMERCIAL

## 2023-02-02 ENCOUNTER — OFFICE VISIT (OUTPATIENT)
Dept: PODIATRY | Facility: CLINIC | Age: 58
End: 2023-02-02
Payer: COMMERCIAL

## 2023-02-02 VITALS — BODY MASS INDEX: 44.1 KG/M2 | HEIGHT: 71 IN | WEIGHT: 315 LBS | OXYGEN SATURATION: 97 % | HEART RATE: 88 BPM

## 2023-02-02 DIAGNOSIS — M79.671 BILATERAL FOOT PAIN: ICD-10-CM

## 2023-02-02 DIAGNOSIS — M79.672 BILATERAL FOOT PAIN: ICD-10-CM

## 2023-02-02 DIAGNOSIS — G60.9 IDIOPATHIC PERIPHERAL NEUROPATHY: Primary | ICD-10-CM

## 2023-02-02 DIAGNOSIS — R60.0 BILATERAL LOWER EXTREMITY EDEMA: ICD-10-CM

## 2023-02-02 DIAGNOSIS — E11.9 TYPE 2 DIABETES MELLITUS TREATED WITHOUT INSULIN: ICD-10-CM

## 2023-02-02 PROCEDURE — 73630 X-RAY EXAM OF FOOT: CPT | Mod: S$GLB,,, | Performed by: RADIOLOGY

## 2023-02-02 PROCEDURE — 99214 OFFICE O/P EST MOD 30 MIN: CPT | Mod: S$GLB,,, | Performed by: PODIATRIST

## 2023-02-02 PROCEDURE — 3008F PR BODY MASS INDEX (BMI) DOCUMENTED: ICD-10-PCS | Mod: CPTII,S$GLB,, | Performed by: PODIATRIST

## 2023-02-02 PROCEDURE — 73630 XR FOOT COMPLETE 3 VIEW BILATERAL: ICD-10-PCS | Mod: S$GLB,,, | Performed by: RADIOLOGY

## 2023-02-02 PROCEDURE — 1160F RVW MEDS BY RX/DR IN RCRD: CPT | Mod: CPTII,S$GLB,, | Performed by: PODIATRIST

## 2023-02-02 PROCEDURE — 3008F BODY MASS INDEX DOCD: CPT | Mod: CPTII,S$GLB,, | Performed by: PODIATRIST

## 2023-02-02 PROCEDURE — 1160F PR REVIEW ALL MEDS BY PRESCRIBER/CLIN PHARMACIST DOCUMENTED: ICD-10-PCS | Mod: CPTII,S$GLB,, | Performed by: PODIATRIST

## 2023-02-02 PROCEDURE — 1159F PR MEDICATION LIST DOCUMENTED IN MEDICAL RECORD: ICD-10-PCS | Mod: CPTII,S$GLB,, | Performed by: PODIATRIST

## 2023-02-02 PROCEDURE — 1159F MED LIST DOCD IN RCRD: CPT | Mod: CPTII,S$GLB,, | Performed by: PODIATRIST

## 2023-02-02 PROCEDURE — 99214 PR OFFICE/OUTPT VISIT, EST, LEVL IV, 30-39 MIN: ICD-10-PCS | Mod: S$GLB,,, | Performed by: PODIATRIST

## 2023-02-02 NOTE — PATIENT INSTRUCTIONS
Peripheral Neuropathy  Peripheral neuropathy is a condition that affects the nerves of the arms or legs. It causes a change in physical feeling. Sometimes it causes weakness in the muscles. You may feel tingling, numbness or shooting pains. Symptoms may be more common at night. Skin may be extra sensitive to light touch or temperature changes.  Neuropathy may be a complication of a chronic disease such as diabetes. A ruptured disk with pressure on the spinal nerve may also lead to the problem. Certain vitamin deficiencies may lead to it. It may also be caused by exposure to certain drugs or chemicals.    Home care  Tell the healthcare provider about all medicines you take. This includes prescription and over-the-counter medicines, vitamins, and herbs. Ask if any of the medicines may be causing your problems. Do not make any changes to prescription medicines without talking to your healthcare provider first.  You may be prescribed medicines to help relieve the tingling feeling or for pain. Take all medicines as directed.  A numb hand or foot may be more prone to injury. To help protect it:  Always use oven mitts.  Test water with an unaffected hand or foot.  Use caution when trimming nails. File sharp areas.  Wear shoes that fit well to avoid pressure points, blisters, and ulcers.  Inspect your hands and feet carefully (including the soles of your feet and between your toes) at least once a week. If you see red areas, sores, or other problems, tell your healthcare provider.    Follow-up care  Follow up with your doctor or as advised by our staff. You may need further testing or evaluation.    When to seek medical advice  Call your healthcare provider right away if any of the following occur:  Redness, swelling, cracking, or ulcer on any numb area, especially the feet  New symptoms of numbness or muscle weakness numbness  Loss of bowel or bladder control  Slurred speech, confusion, or trouble speaking, walking, or  seeing    Date Last Reviewed: 9/26/2015  © 8096-7257 [employer]. [address] [place] [address], [place] [address]. All rights reserved. This information is not intended as a substitute for professional medical care. Always follow your healthcare professional's instructions.

## 2023-02-02 NOTE — PROGRESS NOTES
"  [address]  [place] [address]  Phone: [phone]   [phone]    Patient's PCP:Werner Patel NP  Referring Provider: Werner Patel    Subjective:      Chief Complaint:: Foot Pain (Bilateral foot pain )    Foot Pain  Associated symptoms include arthralgias, joint swelling and numbness. Pertinent negatives include no abdominal pain, chest pain, chills, coughing, fatigue, fever, headaches, myalgias, nausea, rash or weakness.     Be Shipman is a 57 y.o. male who presents today with a complaint of Bilateral foot pain.  Onset of symptoms started 1 year ago and reports no trauma.  Current symptoms include pain all the time in his feet. Aggravating factors are walking , standing, and barefoot. Symptoms have have gotten worse .  Treatment to date have included none.  Patient states he has near constant burning in his feet.  He states this is worse at night.    Systemic Doctor: Werner Patel NP  Date Last Seen: 01/23/2023   Blood Sugar: Patient is borderline diabetic   Hemoglobin A1c: 6 ( 09/01/2022 )     Vitals:    02/02/23 1518   Pulse: 88   SpO2: 97%   Weight: (!) 155.1 kg (342 lb)   Height: 5' 11" (1.803 m)   PainSc: 10-Worst pain ever   PainLoc: Foot      Shoe Size: 13    Past Surgical History:   Procedure Laterality Date    CORONARY STENT PLACEMENT  08/09/2019    pt has card in [facility] [address]    HAND SURGERY Left     22 y/o, laceration repair    HERNIA REPAIR      umbilical hernia    INCISION AND DRAINAGE FOOT Right 09/13/2021    Procedure: INCISION AND DRAINAGE, FOOT;  Surgeon: Juan Jose Julio DPM;  Location: [facility] OR;  Service: Podiatry;  Laterality: Right;    left breast tumor removed (benign)       LEFT HEART CATHETERIZATION Left 08/09/2019    Procedure: Left heart cath;  Surgeon: Erik Jessica MD;  Location: [facility] CATH;  Service: Cardiology;  Laterality: Left;    LEG SURGERY Left 10/2022     Past Medical History:   Diagnosis Date    Anticoagulant long-term use  "    Cellulitis of left leg 10/2022    Coronary artery disease     Diabetes mellitus     Hyperlipidemia     Hypertension     Morbid obesity      Family History   Problem Relation Age of Onset    Heart disease Mother         CAD    Hypertension Mother     Cancer Sister         breast    Heart disease Sister 50    Heart disease Brother         MI    Stroke Brother     Heart disease Brother         MI    Heart disease Brother         MI        Social History:   Marital Status:   Alcohol History:  reports current alcohol use.  Tobacco History:  reports that he has quit smoking. He quit smokeless tobacco use about 22 years ago.  Drug History:  reports no history of drug use.    Review of patient's allergies indicates:   Allergen Reactions    Tetanus vaccines and toxoid Anaphylaxis, Hives and Rash       Current Outpatient Medications   Medication Sig Dispense Refill    allopurinoL (ZYLOPRIM) 300 MG tablet TAKE 1 TABLET BY MOUTH EVERY DAY 90 tablet 0    amLODIPine (NORVASC) 10 MG tablet Take 1 tablet (10 mg total) by mouth once daily. 90 tablet 3    atenoloL (TENORMIN) 50 MG tablet Take 50 mg by mouth every evening.      atenoloL-chlorthalidone (TENORETIC) 100-25 mg per tablet Take 1 tablet by mouth once daily.      atorvastatin (LIPITOR) 40 MG tablet TAKE 1 TABLET BY MOUTH EVERY DAY 90 tablet 0    clopidogreL (PLAVIX) 75 mg tablet TAKE 1 TABLET BY MOUTH EVERY DAY (Patient taking differently: Take 75 mg by mouth once daily.) 90 tablet 3    esomeprazole (NEXIUM) 40 MG capsule Take 40 mg by mouth before breakfast.      furosemide (LASIX) 20 MG tablet Take 1 tablet (20 mg total) by mouth once daily. 90 tablet 0    olmesartan (BENICAR) 40 MG tablet Take 1 tablet (40 mg total) by mouth once daily. 90 tablet 3    potassium chloride SA (K-DUR,KLOR-CON) 20 MEQ tablet TAKE 1 TABLET BY MOUTH EVERY DAY 90 tablet 0    cloNIDine (CATAPRES) 0.1 MG tablet Take 1 tablet (0.1 mg total) by mouth 2 (two) times daily. 180 tablet 0     nitroGLYCERIN (NITROSTAT) 0.4 MG SL tablet Place 1 tablet (0.4 mg total) under the tongue every 5 (five) minutes as needed for Chest pain. 25 tablet 2     No current facility-administered medications for this visit.       Review of Systems   Constitutional:  Negative for chills, fatigue, fever and unexpected weight change.   HENT:  Negative for hearing loss and trouble swallowing.    Eyes:  Negative for photophobia and visual disturbance.   Respiratory:  Negative for cough, shortness of breath and wheezing.    Cardiovascular:  Positive for leg swelling. Negative for chest pain and palpitations.   Gastrointestinal:  Negative for abdominal pain and nausea.   Genitourinary:  Negative for dysuria and frequency.   Musculoskeletal:  Positive for arthralgias, back pain and joint swelling. Negative for gait problem and myalgias.   Skin:  Negative for rash and wound.   Neurological:  Positive for numbness. Negative for tremors, seizures, weakness and headaches.   Hematological:  Does not bruise/bleed easily.       Objective:        Physical Exam:   Foot Exam    General  Orientation: alert and oriented to person, place, and time   Affect: appropriate   Gait: antalgic       Right Foot/Ankle     Inspection and Palpation  Ecchymosis: none  Tenderness: (Generalized tenderness to the foot)  Swelling: (Moderate lower extremity edema)  Arch: normal  Skin Exam: skin intact; no drainage, no ulcer and no erythema   Neurovascular  Dorsalis pedis: 2+  Posterior tibial: 2+  Capillary Refill: 2+  Varicose veins: not present  Saphenous nerve sensation: diminished  Tibial nerve sensation: diminished  Superficial peroneal nerve sensation: diminished  Deep peroneal nerve sensation: diminished  Sural nerve sensation: diminished    Edema  Type of edema: pitting  Edema grade: 1+     Muscle Strength  Ankle dorsiflexion: 5  Ankle plantar flexion: 5  Ankle inversion: 5  Ankle eversion: 5  Great toe extension: 5  Great toe flexion: 5    Range of  Motion    Normal right ankle ROM    Tests  Anterior drawer: negative   Talar tilt: negative   PT Tinel's sign: negative    Paresthesia: positive    Left Foot/Ankle      Inspection and Palpation  Ecchymosis: none  Tenderness: (Generalized tenderness to the foot)  Swelling: (Moderate lower extremity edema)  Arch: normal  Skin Exam: skin intact; no drainage, no ulcer and no erythema   Neurovascular  Dorsalis pedis: 2+  Posterior tibial: 2+  Capillary refill: 2+  Varicose veins: not present  Saphenous nerve sensation: diminished  Tibial nerve sensation: diminished  Superficial peroneal nerve sensation: diminished  Deep peroneal nerve sensation: diminished  Sural nerve sensation: diminished    Edema  Type of edema: pitting  Edema grade: 1+    Muscle Strength  Ankle dorsiflexion: 5  Ankle plantar flexion: 5  Ankle inversion: 5  Ankle eversion: 5  Great toe extension: 5  Great toe flexion: 5    Range of Motion    Normal left ankle ROM    Tests  Anterior drawer: negative   Talar tilt: negative   PT Tinel's sign: negative  Paresthesia: positive    Physical Exam  Cardiovascular:      Pulses:           Dorsalis pedis pulses are 2+ on the right side and 2+ on the left side.        Posterior tibial pulses are 2+ on the right side and 2+ on the left side.   Feet:      Right foot:      Skin integrity: No ulcer or erythema.      Left foot:      Skin integrity: No ulcer or erythema.             Right Ankle/Foot Exam     Range of Motion   The patient has normal right ankle ROM.    Left Ankle/Foot Exam     Range of Motion   The patient has normal left ankle ROM.       Muscle Strength   Right Lower Extremity   Ankle Dorsiflexion:  5   Plantar flexion:  5/5  Left Lower Extremity   Ankle Dorsiflexion:  5   Plantar flexion:  5/5     Reflexes     Left Side  Paresthesia: present    Right Side   Paresthesia: present    Vascular Exam     Right Pulses  Dorsalis Pedis:      2+  Posterior Tibial:      2+        Left Pulses  Dorsalis Pedis:       2+  Posterior Tibial:      2+         Imaging: X-Ray Foot Complete Bilateral  Narrative: EXAMINATION:  XR FOOT COMPLETE 3 VIEW BILATERAL    CLINICAL HISTORY:  Pain in right foot    TECHNIQUE:  AP, lateral, and oblique views of both feet were performed.    COMPARISON:  None    FINDINGS:  There is no evidence fracture or malalignment.  The adjacent soft tissues are unremarkable.  Impression: There is no evidence acute bony injury of either foot.  There are degenerative changes of both feet.    Electronically signed by: Marquita Robles MD  Date:    02/02/2023  Time:    15:34               Assessment:       1. Idiopathic peripheral neuropathy    2. Bilateral lower extremity edema    3. Type 2 diabetes mellitus treated without insulin    4. Bilateral foot pain      Plan:   Idiopathic peripheral neuropathy  -     Ambulatory referral/consult to Neurology; Future; Expected date: 02/09/2023    Bilateral lower extremity edema  -     Ambulatory referral/consult to Vascular Surgery; Future; Expected date: 02/09/2023    Type 2 diabetes mellitus treated without insulin  -     Ambulatory referral/consult to Podiatry    Bilateral foot pain  -     X-Ray Foot Complete Bilateral      Follow up if symptoms worsen or fail to improve.    Procedures        I discussed with the patient the majority of symptoms are secondary to neuropathy.  I explained that in his medical history or there are multiple potential causes.  Specifically, I discussed possibility of diabetes, lower back issues, and alcohol use.  I also explained possibility of idiopathic neuropathy.  For the neuropathy I am going to refer him to Neurology for EMG and NCV testing and further evaluation.  Also recommend he take a B complex vitamin daily.    I am also going to send in a referral to vascular surgery for his significant lower extremity edema.  I discussed the importance of further evaluation for this as he is already had issues with cellulitis in the leg and I  discussed possibility of venous wound formation.    Counseling:     I provided patient education verbally regarding:   Patient diagnosis, treatment options, as well as alternatives, risks, and benefits.     Patient  was made aware of inspecting their feet.  Patient was told to be aware of any breaks in the skin or redness.  With neuropathy, these areas are not recognized early due to the numbness.  I discussed the lab test and NCV EMG test and also the role of the neurologist in evaluating the patient.  I discussed Diabetes, lower back issues, metabolic disorders, systemic causes, chemotherapy, viatmain defiencey, heavy metal exposure, as some of the causes.  I also explained that as much as 40% of the time we can not find a cause.  I discussed different treatments available to control the symptoms but whcih may not cure the problem.    I discussed venous incompetency and sequela of edema, skin pigmentation with hemosiderin deposits, potential ulcer formation.  I discussed compression hose, elevation and possible vascular consult.      This note was created using Dragon voice recognition software that occasionally misinterpreted phrases or words.

## 2023-02-08 ENCOUNTER — HOSPITAL ENCOUNTER (OUTPATIENT)
Dept: RADIOLOGY | Facility: HOSPITAL | Age: 58
Discharge: HOME | End: 2023-02-08
Attending: NURSE PRACTITIONER
Payer: COMMERCIAL

## 2023-02-08 ENCOUNTER — TELEPHONE (OUTPATIENT)
Dept: FAMILY MEDICINE | Facility: CLINIC | Age: 58
End: 2023-02-08

## 2023-02-08 DIAGNOSIS — M79.89 PAIN AND SWELLING OF LOWER LEG, LEFT: ICD-10-CM

## 2023-02-08 DIAGNOSIS — M79.662 PAIN AND SWELLING OF LOWER LEG, LEFT: ICD-10-CM

## 2023-02-08 PROCEDURE — 93971 EXTREMITY STUDY: CPT | Mod: TC,PO,LT

## 2023-02-08 PROCEDURE — 76881 US COMPL JOINT R-T W/IMG: CPT | Mod: TC,PO,LT

## 2023-02-08 NOTE — TELEPHONE ENCOUNTER
----- Message from Werner Patel NP sent at 2/8/2023 12:49 PM CST -----   No evidence of focal abscess formation or soft tissue mass. Edema from previous cellulitis infection. Did he schedule an appt to see Dr Ge? Is he having any increased redness or pain in the left calf

## 2023-02-08 NOTE — TELEPHONE ENCOUNTER
Spoke wit pt, and reviewed u/s results. He said the redness/pain is about the same, has not worsened and has an appointment with Luma on the 16th. Both u/s faxed to Dr. Ge's office.

## 2023-02-09 ENCOUNTER — PATIENT MESSAGE (OUTPATIENT)
Dept: FAMILY MEDICINE | Facility: CLINIC | Age: 58
End: 2023-02-09

## 2023-02-16 ENCOUNTER — LAB VISIT (OUTPATIENT)
Dept: LAB | Facility: HOSPITAL | Age: 58
End: 2023-02-16
Attending: NURSE PRACTITIONER
Payer: COMMERCIAL

## 2023-02-16 DIAGNOSIS — Z00.00 PREVENTATIVE HEALTH CARE: ICD-10-CM

## 2023-02-16 DIAGNOSIS — E11.9 TYPE 2 DIABETES MELLITUS TREATED WITHOUT INSULIN: ICD-10-CM

## 2023-02-16 DIAGNOSIS — G62.9 PERIPHERAL NERVE DISORDER: Primary | ICD-10-CM

## 2023-02-16 LAB
ALBUMIN SERPL BCP-MCNC: 4.3 G/DL (ref 3.5–5.2)
ALBUMIN/CREAT UR: 19.8 UG/MG (ref 0–30)
ALP SERPL-CCNC: 77 U/L (ref 55–135)
ALT SERPL W/O P-5'-P-CCNC: 29 U/L (ref 10–44)
ANION GAP SERPL CALC-SCNC: 12 MMOL/L (ref 8–16)
AST SERPL-CCNC: 50 U/L (ref 10–40)
BILIRUB SERPL-MCNC: 0.7 MG/DL (ref 0.1–1)
BUN SERPL-MCNC: 11 MG/DL (ref 6–20)
CALCIUM SERPL-MCNC: 9.6 MG/DL (ref 8.7–10.5)
CHLORIDE SERPL-SCNC: 100 MMOL/L (ref 95–110)
CO2 SERPL-SCNC: 26 MMOL/L (ref 23–29)
COMPLEXED PSA SERPL-MCNC: 1.3 NG/ML (ref 0–4)
CREAT SERPL-MCNC: 0.8 MG/DL (ref 0.5–1.4)
CREAT UR-MCNC: 49 MG/DL (ref 23–375)
CRP SERPL-MCNC: 0.95 MG/DL
ERYTHROCYTE [SEDIMENTATION RATE] IN BLOOD BY WESTERGREN METHOD: 36 MM/HR (ref 0–10)
EST. GFR  (NO RACE VARIABLE): >60 ML/MIN/1.73 M^2
ESTIMATED AVG GLUCOSE: 140 MG/DL (ref 68–131)
FOLATE SERPL-MCNC: >24.8 NG/ML (ref 4–24)
GLUCOSE SERPL-MCNC: 114 MG/DL (ref 70–110)
HBA1C MFR BLD: 6.5 % (ref 4.5–6.2)
MICROALBUMIN UR DL<=1MG/L-MCNC: 9.7 UG/ML
POTASSIUM SERPL-SCNC: 3.5 MMOL/L (ref 3.5–5.1)
PROT SERPL-MCNC: 8.1 G/DL (ref 6–8.4)
SODIUM SERPL-SCNC: 138 MMOL/L (ref 136–145)
TSH SERPL DL<=0.005 MIU/L-ACNC: 2.03 UIU/ML (ref 0.34–5.6)
VIT B12 SERPL-MCNC: 338 PG/ML (ref 210–950)

## 2023-02-16 PROCEDURE — 85651 RBC SED RATE NONAUTOMATED: CPT | Performed by: NURSE PRACTITIONER

## 2023-02-16 PROCEDURE — 83921 ORGANIC ACID SINGLE QUANT: CPT | Performed by: NURSE PRACTITIONER

## 2023-02-16 PROCEDURE — 82570 ASSAY OF URINE CREATININE: CPT | Performed by: NURSE PRACTITIONER

## 2023-02-16 PROCEDURE — 82607 VITAMIN B-12: CPT | Performed by: NURSE PRACTITIONER

## 2023-02-16 PROCEDURE — 84443 ASSAY THYROID STIM HORMONE: CPT | Performed by: NURSE PRACTITIONER

## 2023-02-16 PROCEDURE — 84425 ASSAY OF VITAMIN B-1: CPT | Performed by: NURSE PRACTITIONER

## 2023-02-16 PROCEDURE — 36415 COLL VENOUS BLD VENIPUNCTURE: CPT | Performed by: NURSE PRACTITIONER

## 2023-02-16 PROCEDURE — 84153 ASSAY OF PSA TOTAL: CPT | Performed by: NURSE PRACTITIONER

## 2023-02-16 PROCEDURE — 86160 COMPLEMENT ANTIGEN: CPT | Performed by: NURSE PRACTITIONER

## 2023-02-16 PROCEDURE — 82746 ASSAY OF FOLIC ACID SERUM: CPT | Performed by: NURSE PRACTITIONER

## 2023-02-16 PROCEDURE — 80053 COMPREHEN METABOLIC PANEL: CPT | Performed by: NURSE PRACTITIONER

## 2023-02-16 PROCEDURE — 84155 ASSAY OF PROTEIN SERUM: CPT | Performed by: NURSE PRACTITIONER

## 2023-02-16 PROCEDURE — 86038 ANTINUCLEAR ANTIBODIES: CPT | Performed by: NURSE PRACTITIONER

## 2023-02-16 PROCEDURE — 86140 C-REACTIVE PROTEIN: CPT | Performed by: NURSE PRACTITIONER

## 2023-02-16 PROCEDURE — 86803 HEPATITIS C AB TEST: CPT | Performed by: NURSE PRACTITIONER

## 2023-02-16 PROCEDURE — 86160 COMPLEMENT ANTIGEN: CPT | Mod: 59 | Performed by: NURSE PRACTITIONER

## 2023-02-16 PROCEDURE — 84165 PROTEIN E-PHORESIS SERUM: CPT | Performed by: NURSE PRACTITIONER

## 2023-02-16 PROCEDURE — 83036 HEMOGLOBIN GLYCOSYLATED A1C: CPT | Performed by: NURSE PRACTITIONER

## 2023-02-16 PROCEDURE — 84207 ASSAY OF VITAMIN B-6: CPT | Performed by: NURSE PRACTITIONER

## 2023-02-17 LAB
ALBUMIN SERPL ELPH-MCNC: 3.6 G/DL (ref 2.9–4.4)
ALBUMIN/GLOB SERPL: 0.9 {RATIO} (ref 0.7–1.7)
ALPHA1 GLOB SERPL ELPH-MCNC: 0.3 G/DL (ref 0–0.4)
ALPHA2 GLOB SERPL ELPH-MCNC: 1.1 G/DL (ref 0.4–1)
ANA TITR SER IF: NEGATIVE {TITER}
B-GLOBULIN SERPL ELPH-MCNC: 1.3 G/DL (ref 0.7–1.3)
C3 SERPL-MCNC: 192 MG/DL (ref 82–167)
C4 SERPL-MCNC: 39 MG/DL (ref 12–38)
GAMMA GLOB SERPL ELPH-MCNC: 1.1 G/DL (ref 0.4–1.8)
GLOBULIN SER CALC-MCNC: 3.8 G/DL (ref 2.2–3.9)
HCV AB S/CO SERPL IA: NON REACTIVE
LABORATORY COMMENT REPORT: ABNORMAL
M PROTEIN SERPL ELPH-MCNC: ABNORMAL G/DL
PROT SERPL-MCNC: 7.4 G/DL (ref 6–8.5)

## 2023-02-19 LAB — VIT B1 BLD-SCNC: 264.4 NMOL/L (ref 66.5–200)

## 2023-02-20 LAB — VIT B6 SERPL-MCNC: 56.6 UG/L (ref 3.4–65.2)

## 2023-02-22 ENCOUNTER — PATIENT MESSAGE (OUTPATIENT)
Dept: FAMILY MEDICINE | Facility: CLINIC | Age: 58
End: 2023-02-22

## 2023-02-23 LAB — METHLYMALONIC ACID: 101 NMOL/L (ref 0–378)

## 2023-04-30 DIAGNOSIS — M1A.0790 CHRONIC GOUT OF FOOT, UNSPECIFIED CAUSE, UNSPECIFIED LATERALITY: ICD-10-CM

## 2023-05-01 RX ORDER — ALLOPURINOL 300 MG/1
TABLET ORAL
Qty: 90 TABLET | Refills: 0 | Status: SHIPPED | OUTPATIENT
Start: 2023-05-01

## 2023-05-24 ENCOUNTER — TELEPHONE (OUTPATIENT)
Dept: FAMILY MEDICINE | Facility: CLINIC | Age: 58
End: 2023-05-24

## 2023-05-24 ENCOUNTER — PATIENT MESSAGE (OUTPATIENT)
Dept: FAMILY MEDICINE | Facility: CLINIC | Age: 58
End: 2023-05-24

## 2023-05-24 NOTE — TELEPHONE ENCOUNTER
YOUR EYE EXAM IS DUE AND I AM REACHING OUT TO YOU DO GET EXAM DONE. IF YOU HAVE ALREADY DONE THIS, PLEASE CALL CLINIC WITH INFORMATION TO UPDATE YOU CHART.

## 2023-05-27 DIAGNOSIS — E87.6 HYPOKALEMIA: ICD-10-CM

## 2023-05-27 DIAGNOSIS — E78.2 HYPERLIPIDEMIA, MIXED: ICD-10-CM

## 2023-05-30 RX ORDER — POTASSIUM CHLORIDE 20 MEQ/1
TABLET, EXTENDED RELEASE ORAL
Qty: 90 TABLET | Refills: 0 | Status: SHIPPED | OUTPATIENT
Start: 2023-05-30

## 2023-05-30 RX ORDER — ATORVASTATIN CALCIUM 40 MG/1
TABLET, FILM COATED ORAL
Qty: 90 TABLET | Refills: 0 | Status: SHIPPED | OUTPATIENT
Start: 2023-05-30

## 2023-07-12 ENCOUNTER — PATIENT MESSAGE (OUTPATIENT)
Dept: ADMINISTRATIVE | Facility: HOSPITAL | Age: 58
End: 2023-07-12
Payer: COMMERCIAL

## 2023-07-12 ENCOUNTER — PATIENT OUTREACH (OUTPATIENT)
Dept: ADMINISTRATIVE | Facility: HOSPITAL | Age: 58
End: 2023-07-12
Payer: COMMERCIAL

## 2023-07-12 NOTE — PROGRESS NOTES

## 2024-03-28 ENCOUNTER — PATIENT MESSAGE (OUTPATIENT)
Dept: ADMINISTRATIVE | Facility: HOSPITAL | Age: 59
End: 2024-03-28
Payer: COMMERCIAL

## 2024-05-22 ENCOUNTER — LAB VISIT (OUTPATIENT)
Dept: LAB | Facility: HOSPITAL | Age: 59
End: 2024-05-22
Attending: NURSE PRACTITIONER
Payer: COMMERCIAL

## 2024-05-22 DIAGNOSIS — I10 ESSENTIAL HYPERTENSION, MALIGNANT: Primary | ICD-10-CM

## 2024-05-22 DIAGNOSIS — E78.2 MIXED HYPERLIPIDEMIA: ICD-10-CM

## 2024-05-22 LAB
ALBUMIN SERPL BCP-MCNC: 4.2 G/DL (ref 3.5–5.2)
ALP SERPL-CCNC: 79 U/L (ref 55–135)
ALT SERPL W/O P-5'-P-CCNC: 22 U/L (ref 10–44)
ANION GAP SERPL CALC-SCNC: 7 MMOL/L (ref 8–16)
AST SERPL-CCNC: 27 U/L (ref 10–40)
BILIRUB SERPL-MCNC: 0.4 MG/DL (ref 0.1–1)
BUN SERPL-MCNC: 20 MG/DL (ref 6–20)
CALCIUM SERPL-MCNC: 9.6 MG/DL (ref 8.7–10.5)
CHLORIDE SERPL-SCNC: 100 MMOL/L (ref 95–110)
CHOLEST SERPL-MCNC: 265 MG/DL (ref 120–199)
CHOLEST/HDLC SERPL: 4.9 {RATIO} (ref 2–5)
CO2 SERPL-SCNC: 33 MMOL/L (ref 23–29)
CREAT SERPL-MCNC: 0.8 MG/DL (ref 0.5–1.4)
ERYTHROCYTE [DISTWIDTH] IN BLOOD BY AUTOMATED COUNT: 18 % (ref 11.5–14.5)
EST. GFR  (NO RACE VARIABLE): >60 ML/MIN/1.73 M^2
ESTIMATED AVG GLUCOSE: 146 MG/DL (ref 68–131)
GLUCOSE SERPL-MCNC: 122 MG/DL (ref 70–110)
HBA1C MFR BLD: 6.7 % (ref 4.5–6.2)
HCT VFR BLD AUTO: 40.9 % (ref 40–54)
HDLC SERPL-MCNC: 54 MG/DL (ref 40–75)
HDLC SERPL: 20.4 % (ref 20–50)
HGB BLD-MCNC: 12.8 G/DL (ref 14–18)
LDLC SERPL CALC-MCNC: 176.6 MG/DL (ref 63–159)
MAGNESIUM SERPL-MCNC: 1.7 MG/DL (ref 1.6–2.6)
MCH RBC QN AUTO: 27 PG (ref 27–31)
MCHC RBC AUTO-ENTMCNC: 31.3 G/DL (ref 32–36)
MCV RBC AUTO: 86 FL (ref 82–98)
NONHDLC SERPL-MCNC: 211 MG/DL
PLATELET # BLD AUTO: 239 K/UL (ref 150–450)
PMV BLD AUTO: 10.2 FL (ref 9.2–12.9)
POTASSIUM SERPL-SCNC: 4.3 MMOL/L (ref 3.5–5.1)
PROT SERPL-MCNC: 7.5 G/DL (ref 6–8.4)
RBC # BLD AUTO: 4.74 M/UL (ref 4.6–6.2)
SODIUM SERPL-SCNC: 140 MMOL/L (ref 136–145)
TRIGL SERPL-MCNC: 172 MG/DL (ref 30–150)
TSH SERPL DL<=0.005 MIU/L-ACNC: 2.86 UIU/ML (ref 0.34–5.6)
WBC # BLD AUTO: 7.49 K/UL (ref 3.9–12.7)

## 2024-05-22 PROCEDURE — 83735 ASSAY OF MAGNESIUM: CPT | Performed by: NURSE PRACTITIONER

## 2024-05-22 PROCEDURE — 83036 HEMOGLOBIN GLYCOSYLATED A1C: CPT | Performed by: NURSE PRACTITIONER

## 2024-05-22 PROCEDURE — 80061 LIPID PANEL: CPT | Performed by: NURSE PRACTITIONER

## 2024-05-22 PROCEDURE — 36415 COLL VENOUS BLD VENIPUNCTURE: CPT | Performed by: NURSE PRACTITIONER

## 2024-05-22 PROCEDURE — 84443 ASSAY THYROID STIM HORMONE: CPT | Performed by: NURSE PRACTITIONER

## 2024-05-22 PROCEDURE — 80053 COMPREHEN METABOLIC PANEL: CPT | Performed by: NURSE PRACTITIONER

## 2024-05-22 PROCEDURE — 85027 COMPLETE CBC AUTOMATED: CPT | Performed by: NURSE PRACTITIONER

## 2024-07-09 ENCOUNTER — PATIENT MESSAGE (OUTPATIENT)
Dept: ADMINISTRATIVE | Facility: HOSPITAL | Age: 59
End: 2024-07-09

## 2024-10-30 ENCOUNTER — LAB VISIT (OUTPATIENT)
Dept: LAB | Facility: HOSPITAL | Age: 59
End: 2024-10-30
Attending: NURSE PRACTITIONER
Payer: MEDICAID

## 2024-10-30 DIAGNOSIS — G62.9 PERIPHERAL NERVE DISORDER: Primary | ICD-10-CM

## 2024-10-30 LAB
ALBUMIN SERPL BCP-MCNC: 4.2 G/DL (ref 3.5–5.2)
ALP SERPL-CCNC: 68 U/L (ref 55–135)
ALT SERPL W/O P-5'-P-CCNC: 15 U/L (ref 10–44)
ANION GAP SERPL CALC-SCNC: 12 MMOL/L (ref 8–16)
AST SERPL-CCNC: 19 U/L (ref 10–40)
BILIRUB SERPL-MCNC: 0.6 MG/DL (ref 0.1–1)
BUN SERPL-MCNC: 23 MG/DL (ref 6–20)
CALCIUM SERPL-MCNC: 9.4 MG/DL (ref 8.7–10.5)
CHLORIDE SERPL-SCNC: 97 MMOL/L (ref 95–110)
CO2 SERPL-SCNC: 31 MMOL/L (ref 23–29)
CREAT SERPL-MCNC: 1 MG/DL (ref 0.5–1.4)
CRP SERPL-MCNC: 0.8 MG/DL
ERYTHROCYTE [SEDIMENTATION RATE] IN BLOOD BY WESTERGREN METHOD: 13 MM/HR (ref 0–10)
EST. GFR  (NO RACE VARIABLE): >60 ML/MIN/1.73 M^2
ESTIMATED AVG GLUCOSE: 146 MG/DL (ref 68–131)
FOLATE SERPL-MCNC: 15.2 NG/ML (ref 4–24)
GLUCOSE SERPL-MCNC: 94 MG/DL (ref 70–110)
HBA1C MFR BLD: 6.7 % (ref 4.5–6.2)
LEFT EYE DM RETINOPATHY: NEGATIVE
POTASSIUM SERPL-SCNC: 3.2 MMOL/L (ref 3.5–5.1)
PROT SERPL-MCNC: 7.6 G/DL (ref 6–8.4)
RIGHT EYE DM RETINOPATHY: NEGATIVE
SODIUM SERPL-SCNC: 140 MMOL/L (ref 136–145)
TSH SERPL DL<=0.005 MIU/L-ACNC: 2.2 UIU/ML (ref 0.34–5.6)
VIT B12 SERPL-MCNC: 291 PG/ML (ref 210–950)

## 2024-10-30 PROCEDURE — 83036 HEMOGLOBIN GLYCOSYLATED A1C: CPT | Performed by: NURSE PRACTITIONER

## 2024-10-30 PROCEDURE — 84425 ASSAY OF VITAMIN B-1: CPT | Performed by: NURSE PRACTITIONER

## 2024-10-30 PROCEDURE — 84443 ASSAY THYROID STIM HORMONE: CPT | Performed by: NURSE PRACTITIONER

## 2024-10-30 PROCEDURE — 86235 NUCLEAR ANTIGEN ANTIBODY: CPT | Performed by: NURSE PRACTITIONER

## 2024-10-30 PROCEDURE — 30000890 LABCORP MISCELLANEOUS TEST: Mod: 91 | Performed by: NURSE PRACTITIONER

## 2024-10-30 PROCEDURE — 84155 ASSAY OF PROTEIN SERUM: CPT | Performed by: NURSE PRACTITIONER

## 2024-10-30 PROCEDURE — 83921 ORGANIC ACID SINGLE QUANT: CPT | Performed by: NURSE PRACTITIONER

## 2024-10-30 PROCEDURE — 84207 ASSAY OF VITAMIN B-6: CPT | Performed by: NURSE PRACTITIONER

## 2024-10-30 PROCEDURE — 87476 LYME DIS DNA AMP PROBE: CPT | Performed by: NURSE PRACTITIONER

## 2024-10-30 PROCEDURE — 82595 ASSAY OF CRYOGLOBULIN: CPT | Performed by: NURSE PRACTITIONER

## 2024-10-30 PROCEDURE — 85732 THROMBOPLASTIN TIME PARTIAL: CPT | Performed by: NURSE PRACTITIONER

## 2024-10-30 PROCEDURE — 85651 RBC SED RATE NONAUTOMATED: CPT | Performed by: NURSE PRACTITIONER

## 2024-10-30 PROCEDURE — 86225 DNA ANTIBODY NATIVE: CPT | Performed by: NURSE PRACTITIONER

## 2024-10-30 PROCEDURE — 86038 ANTINUCLEAR ANTIBODIES: CPT | Performed by: NURSE PRACTITIONER

## 2024-10-30 PROCEDURE — 86160 COMPLEMENT ANTIGEN: CPT | Performed by: NURSE PRACTITIONER

## 2024-10-30 PROCEDURE — 82607 VITAMIN B-12: CPT | Performed by: NURSE PRACTITIONER

## 2024-10-30 PROCEDURE — 86140 C-REACTIVE PROTEIN: CPT | Performed by: NURSE PRACTITIONER

## 2024-10-30 PROCEDURE — 86235 NUCLEAR ANTIGEN ANTIBODY: CPT | Mod: 59 | Performed by: NURSE PRACTITIONER

## 2024-10-30 PROCEDURE — 82746 ASSAY OF FOLIC ACID SERUM: CPT | Performed by: NURSE PRACTITIONER

## 2024-10-30 PROCEDURE — 86037 ANCA TITER EACH ANTIBODY: CPT | Mod: 59 | Performed by: NURSE PRACTITIONER

## 2024-10-30 PROCEDURE — 83520 IMMUNOASSAY QUANT NOS NONAB: CPT | Mod: 91 | Performed by: NURSE PRACTITIONER

## 2024-10-30 PROCEDURE — 36415 COLL VENOUS BLD VENIPUNCTURE: CPT | Performed by: NURSE PRACTITIONER

## 2024-10-30 PROCEDURE — 80053 COMPREHEN METABOLIC PANEL: CPT | Performed by: NURSE PRACTITIONER

## 2024-10-31 LAB
ANA TITR SER IF: NEGATIVE {TITER}
C-ANCA TITR SER IF: NORMAL TITER
C3 SERPL-MCNC: 170 MG/DL (ref 82–167)
C4 SERPL-MCNC: 31 MG/DL (ref 12–38)
DSDNA AB SER-ACNC: <1 IU/ML (ref 0–9)
ENA SM AB SER-ACNC: <0.2 AI (ref 0–0.9)
P-ANCA ATYPICAL TITR SER IF: NORMAL TITER
P-ANCA TITR SER IF: NORMAL TITER

## 2024-11-01 ENCOUNTER — TELEPHONE (OUTPATIENT)
Dept: OPHTHALMOLOGY | Facility: CLINIC | Age: 59
End: 2024-11-01
Payer: MEDICAID

## 2024-11-01 LAB
ALBUMIN SERPL ELPH-MCNC: 3.5 G/DL (ref 2.9–4.4)
ALBUMIN/GLOB SERPL: 1 {RATIO} (ref 0.7–1.7)
ALPHA1 GLOB SERPL ELPH-MCNC: 0.3 G/DL (ref 0–0.4)
ALPHA2 GLOB SERPL ELPH-MCNC: 0.9 G/DL (ref 0.4–1)
B-GLOBULIN SERPL ELPH-MCNC: 1.3 G/DL (ref 0.7–1.3)
GAMMA GLOB SERPL ELPH-MCNC: 1.1 G/DL (ref 0.4–1.8)
GLOBULIN SER CALC-MCNC: 3.6 G/DL (ref 2.2–3.9)
LABORATORY COMMENT REPORT: NORMAL
M PROTEIN SERPL ELPH-MCNC: NORMAL G/DL
PROT SERPL-MCNC: 7.1 G/DL (ref 6–8.5)

## 2024-11-03 LAB — VIT B6 SERPL-MCNC: 8.6 UG/L (ref 3.4–65.2)

## 2024-11-04 LAB — CRYOGLOB SER QL 1D COLD INC: NORMAL

## 2024-11-05 LAB
LABCORP MISC TEST CODE: NORMAL
LABCORP MISC TEST NAME: NORMAL
LABCORP MISCELLANEOUS TEST: NORMAL
VIT B1 BLD-SCNC: 126.3 NMOL/L (ref 66.5–200)

## 2024-11-06 LAB
APTT HEX PL PPP: 1 SEC
APTT IMM NP PPP: NORMAL SEC
APTT PPP 1:1 SALINE: NORMAL SEC
APTT PPP: 24.3 SEC
B2 GLYCOPROT1 IGA SER-ACNC: <10 SAU
B2 GLYCOPROT1 IGG SER-ACNC: <10 SGU
B2 GLYCOPROT1 IGG SER-ACNC: <10 SMU
CARDIOLIPIN IGA SER IA-ACNC: <10 APL
CARDIOLIPIN IGG SER IA-ACNC: <10 GPL
CARDIOLIPIN IGM SER IA-ACNC: <10 MPL
CONFIRM APTT: 1.5 SEC
CONFIRM DRVVT: NORMAL SEC
DRVVT SCREEN TO CONFIRM RATIO: NORMAL RATIO
LABORATORY COMMENT REPORT: NORMAL
METHLYMALONIC ACID: 245 NMOL/L (ref 0–378)
PROTHROM IGG SERPL-ACNC: 1 G UNITS
PS IGG SER IA-ACNC: 3 GPS
PS IGM SER IA-ACNC: 0 MPS
SCREEN DRVVT: 36 SEC

## 2024-11-07 LAB
LABCORP MISC TEST CODE: NORMAL
LABCORP MISC TEST NAME: NORMAL
LABCORP MISCELLANEOUS TEST: NORMAL

## 2024-11-14 ENCOUNTER — HOSPITAL ENCOUNTER (EMERGENCY)
Facility: HOSPITAL | Age: 59
Discharge: HOME | End: 2024-11-14
Attending: EMERGENCY MEDICINE
Payer: MEDICAID

## 2024-11-14 VITALS
TEMPERATURE: 98 F | RESPIRATION RATE: 18 BRPM | WEIGHT: 315 LBS | BODY MASS INDEX: 44.1 KG/M2 | HEIGHT: 71 IN | OXYGEN SATURATION: 96 % | HEART RATE: 101 BPM | DIASTOLIC BLOOD PRESSURE: 80 MMHG | SYSTOLIC BLOOD PRESSURE: 159 MMHG

## 2024-11-14 DIAGNOSIS — M79.673 FOOT PAIN: ICD-10-CM

## 2024-11-14 DIAGNOSIS — M79.89 LEG SWELLING: Primary | ICD-10-CM

## 2024-11-14 DIAGNOSIS — L03.116 CELLULITIS OF LEFT LOWER EXTREMITY: ICD-10-CM

## 2024-11-14 DIAGNOSIS — M25.579 ANKLE PAIN: ICD-10-CM

## 2024-11-14 LAB
ALBUMIN SERPL BCP-MCNC: 3.9 G/DL (ref 3.5–5.2)
ALP SERPL-CCNC: 81 U/L (ref 55–135)
ALT SERPL W/O P-5'-P-CCNC: 17 U/L (ref 10–44)
ANION GAP SERPL CALC-SCNC: 13 MMOL/L (ref 8–16)
AST SERPL-CCNC: 22 U/L (ref 10–40)
BASOPHILS # BLD AUTO: 0.08 K/UL (ref 0–0.2)
BASOPHILS NFR BLD: 0.6 % (ref 0–1.9)
BILIRUB SERPL-MCNC: 0.4 MG/DL (ref 0.1–1)
BNP SERPL-MCNC: 26 PG/ML (ref 0–99)
BUN SERPL-MCNC: 28 MG/DL (ref 6–20)
CALCIUM SERPL-MCNC: 9.5 MG/DL (ref 8.7–10.5)
CHLORIDE SERPL-SCNC: 99 MMOL/L (ref 95–110)
CO2 SERPL-SCNC: 26 MMOL/L (ref 23–29)
CREAT SERPL-MCNC: 1.1 MG/DL (ref 0.5–1.4)
CRP SERPL-MCNC: 4.6 MG/DL
DIFFERENTIAL METHOD BLD: ABNORMAL
EOSINOPHIL # BLD AUTO: 0.3 K/UL (ref 0–0.5)
EOSINOPHIL NFR BLD: 2.5 % (ref 0–8)
ERYTHROCYTE [DISTWIDTH] IN BLOOD BY AUTOMATED COUNT: 16.6 % (ref 11.5–14.5)
ERYTHROCYTE [SEDIMENTATION RATE] IN BLOOD BY WESTERGREN METHOD: 55 MM/HR (ref 0–10)
EST. GFR  (NO RACE VARIABLE): >60 ML/MIN/1.73 M^2
GLUCOSE SERPL-MCNC: 166 MG/DL (ref 70–110)
HCT VFR BLD AUTO: 38.4 % (ref 40–54)
HGB BLD-MCNC: 12.2 G/DL (ref 14–18)
IMM GRANULOCYTES # BLD AUTO: 0.04 K/UL (ref 0–0.04)
IMM GRANULOCYTES NFR BLD AUTO: 0.3 % (ref 0–0.5)
LYMPHOCYTES # BLD AUTO: 2.7 K/UL (ref 1–4.8)
LYMPHOCYTES NFR BLD: 20.8 % (ref 18–48)
MCH RBC QN AUTO: 27.5 PG (ref 27–31)
MCHC RBC AUTO-ENTMCNC: 31.8 G/DL (ref 32–36)
MCV RBC AUTO: 87 FL (ref 82–98)
MONOCYTES # BLD AUTO: 1.1 K/UL (ref 0.3–1)
MONOCYTES NFR BLD: 8.7 % (ref 4–15)
NEUTROPHILS # BLD AUTO: 8.6 K/UL (ref 1.8–7.7)
NEUTROPHILS NFR BLD: 67.1 % (ref 38–73)
NRBC BLD-RTO: 0 /100 WBC
PLATELET # BLD AUTO: 226 K/UL (ref 150–450)
PMV BLD AUTO: 10.5 FL (ref 9.2–12.9)
POTASSIUM SERPL-SCNC: 3.5 MMOL/L (ref 3.5–5.1)
PROT SERPL-MCNC: 7.1 G/DL (ref 6–8.4)
RBC # BLD AUTO: 4.43 M/UL (ref 4.6–6.2)
SODIUM SERPL-SCNC: 138 MMOL/L (ref 136–145)
WBC # BLD AUTO: 12.75 K/UL (ref 3.9–12.7)

## 2024-11-14 PROCEDURE — 99285 EMERGENCY DEPT VISIT HI MDM: CPT | Mod: 25

## 2024-11-14 PROCEDURE — 85651 RBC SED RATE NONAUTOMATED: CPT | Performed by: EMERGENCY MEDICINE

## 2024-11-14 PROCEDURE — 86140 C-REACTIVE PROTEIN: CPT | Performed by: EMERGENCY MEDICINE

## 2024-11-14 PROCEDURE — 80053 COMPREHEN METABOLIC PANEL: CPT | Performed by: EMERGENCY MEDICINE

## 2024-11-14 PROCEDURE — 85025 COMPLETE CBC W/AUTO DIFF WBC: CPT | Performed by: EMERGENCY MEDICINE

## 2024-11-14 PROCEDURE — 83880 ASSAY OF NATRIURETIC PEPTIDE: CPT | Performed by: EMERGENCY MEDICINE

## 2024-11-14 PROCEDURE — 25000003 PHARM REV CODE 250: Performed by: EMERGENCY MEDICINE

## 2024-11-14 RX ORDER — HYDROCODONE BITARTRATE AND ACETAMINOPHEN 5; 325 MG/1; MG/1
1 TABLET ORAL EVERY 6 HOURS PRN
Qty: 12 TABLET | Refills: 0 | Status: SHIPPED | OUTPATIENT
Start: 2024-11-14

## 2024-11-14 RX ORDER — OXYCODONE AND ACETAMINOPHEN 5; 325 MG/1; MG/1
1 TABLET ORAL
Status: COMPLETED | OUTPATIENT
Start: 2024-11-14 | End: 2024-11-14

## 2024-11-14 RX ORDER — CEPHALEXIN 500 MG/1
500 CAPSULE ORAL EVERY 6 HOURS
Qty: 28 CAPSULE | Refills: 0 | Status: SHIPPED | OUTPATIENT
Start: 2024-11-14 | End: 2024-11-21

## 2024-11-14 RX ADMIN — OXYCODONE HYDROCHLORIDE AND ACETAMINOPHEN 1 TABLET: 5; 325 TABLET ORAL at 08:11

## 2024-11-15 NOTE — DISCHARGE INSTRUCTIONS
You have been prescribed a short course of narcotic based pain medication.  Common side effects are drowsiness and constipation.  You may take over-the-counter stool softener such as Senokot as needed, as directed.  Please follow-up with your podiatrist and primary care physician for recheck. Please keep upcoming tests and appointments.

## 2024-11-15 NOTE — ED NOTES
Pt stated he has left ankle and foot swelling for a few months but it has gotten worse. He stated his leg swelling is normal and has always had it. He can not recall hurting his foot at any time.

## 2024-11-15 NOTE — ED PROVIDER NOTES
Encounter Date: 11/14/2024       History     Chief Complaint   Patient presents with    Leg Swelling     Complains of left leg swelling for the last 3 months and having increased pain.  States he went to Dr. Rust yesterday.      HPI  Patient with PMH DM, HLD, hypertension, morbid obesity, chronic lower extremity edema, recurrent cellulitis left lower extremity, CAD s/p PCI presents to ED with gradually worsening left lower extremity pain and swelling over the last 3 months.  Patient just saw his cardiologist yesterday but did not mention this to him.  He states what really concerned him is in the last couple of days the left foot and ankle were swollen even with rest and elevation which is unusual for him.  He denies any fever, skin changes or warmth of the lower leg.  He has longstanding neuropathy and is prescribed Lyrica for this.  He states he is having an angiogram next week to evaluate the blood flow in his legs.  He denies any chest pain or shortness of breath.    Review of patient's allergies indicates:   Allergen Reactions    Tetanus vaccines and toxoid Anaphylaxis, Hives and Rash     Past Medical History:   Diagnosis Date    Anticoagulant long-term use     Cellulitis of left leg 10/2022    Coronary artery disease     Diabetes mellitus     Hyperlipidemia     Hypertension     Morbid obesity      Past Surgical History:   Procedure Laterality Date    CORONARY STENT PLACEMENT  08/09/2019    pt has card in [facility] Dr. Grimm    HAND SURGERY Left     20 y/o, laceration repair    HERNIA REPAIR      umbilical hernia    INCISION AND DRAINAGE FOOT Right 09/13/2021    Procedure: INCISION AND DRAINAGE, FOOT;  Surgeon: Juan Jose Julio DPM;  Location: [facility] OR;  Service: Podiatry;  Laterality: Right;    left breast tumor removed (benign)       LEFT HEART CATHETERIZATION Left 08/09/2019    Procedure: Left heart cath;  Surgeon: Erik Jessica MD;  Location: [facility] CATH;  Service: Cardiology;  Laterality:  Left;    LEG SURGERY Left 10/2022     Family History   Problem Relation Name Age of Onset    Heart disease Mother          CAD    Hypertension Mother      Cancer Sister          breast    Heart disease Sister  50    Heart disease Brother          MI    Stroke Brother      Heart disease Brother          MI    Heart disease Brother          MI     Social History     Tobacco Use    Smoking status: Former    Smokeless tobacco: Former     Quit date: 7/22/2000    Tobacco comments:     quit 12 years ago   Substance Use Topics    Alcohol use: Yes     Comment: every day 1/2 a fifth of whiskey per day    Drug use: No     Review of Systems   Constitutional:  Negative for fever.   HENT:  Negative for sore throat.    Respiratory:  Negative for shortness of breath.    Cardiovascular:  Positive for leg swelling. Negative for chest pain.   Gastrointestinal:  Negative for nausea.   Genitourinary:  Negative for dysuria.   Musculoskeletal:  Positive for myalgias. Negative for back pain.   Skin:  Negative for rash.   Neurological:  Negative for weakness.   Hematological:  Does not bruise/bleed easily.       Physical Exam     Initial Vitals [11/14/24 1932]   BP Pulse Resp Temp SpO2   110/74 109 (!) 22 98.4 °F (36.9 °C) 97 %      MAP       --         Physical Exam    Nursing note and vitals reviewed.  Constitutional: He appears well-developed. No distress.   Morbidly obese   HENT:   Head: Normocephalic and atraumatic.   Nose: Nose normal.   Eyes: Conjunctivae and EOM are normal. Pupils are equal, round, and reactive to light.   Neck: Neck supple.   Normal range of motion.  Cardiovascular:  Normal rate and regular rhythm.           Pulmonary/Chest: Breath sounds normal. No respiratory distress.   Abdominal: Abdomen is soft. There is no abdominal tenderness. There is no rebound and no guarding.   Musculoskeletal:         General: Edema present. No tenderness. Normal range of motion.      Cervical back: Normal range of motion and neck  supple.      Comments: Bilateral lower extremity edema with chronic venous stasis skin changes, no warmth bilaterally.  He does have swelling of the left ankle and foot.  Difficulty palpating pulses but the foot is warm.  Able to verify pulses by Doppler.  No specific point tenderness to touch.  Full range of motion of joints.     Neurological: He is alert and oriented to person, place, and time. He has normal strength. No cranial nerve deficit. GCS score is 15. GCS eye subscore is 4. GCS verbal subscore is 5. GCS motor subscore is 6.   Skin: Skin is warm and dry. Capillary refill takes less than 2 seconds. No rash noted.   Psychiatric: He has a normal mood and affect. Thought content normal.         ED Course   Procedures  Labs Reviewed   CBC W/ AUTO DIFFERENTIAL - Abnormal       Result Value    WBC 12.75 (*)     RBC 4.43 (*)     Hemoglobin 12.2 (*)     Hematocrit 38.4 (*)     MCV 87      MCH 27.5      MCHC 31.8 (*)     RDW 16.6 (*)     Platelets 226      MPV 10.5      Immature Granulocytes 0.3      Gran # (ANC) 8.6 (*)     Immature Grans (Abs) 0.04      Lymph # 2.7      Mono # 1.1 (*)     Eos # 0.3      Baso # 0.08      nRBC 0      Gran % 67.1      Lymph % 20.8      Mono % 8.7      Eosinophil % 2.5      Basophil % 0.6      Differential Method Automated     COMPREHENSIVE METABOLIC PANEL - Abnormal    Sodium 138      Potassium 3.5      Chloride 99      CO2 26      Glucose 166 (*)     BUN 28 (*)     Creatinine 1.1      Calcium 9.5      Total Protein 7.1      Albumin 3.9      Total Bilirubin 0.4      Alkaline Phosphatase 81      AST 22      ALT 17      eGFR >60.0      Anion Gap 13     C-REACTIVE PROTEIN - Abnormal    CRP 4.60 (*)    SEDIMENTATION RATE - Abnormal    Sed Rate 55 (*)    B-TYPE NATRIURETIC PEPTIDE    BNP 26            Imaging Results              US Lower Extremity Veins Left (Final result)  Result time 11/14/24 21:22:42      Final result by Genevieve Marquez MD (11/14/24 21:22:42)                    Impression:      No evidence of deep venous thrombosis in the left lower extremity.    Findings suggestive of left popliteal fossa cyst measuring up to 4.0 cm.      Electronically signed by: Genevieve Marquez MD  Date:    11/14/2024  Time:    21:22               Narrative:    EXAMINATION:  US LOWER EXTREMITY VEINS LEFT    CLINICAL HISTORY:  Other specified soft tissue disorders    TECHNIQUE:  Duplex and color flow Doppler evaluation and graded compression of the left lower extremity veins was performed.    COMPARISON:  None    FINDINGS:  Left thigh veins: The common femoral, femoral, popliteal, upper greater saphenous, and deep femoral veins are patent and free of thrombus. The veins are normally compressible and have normal phasic flow and augmentation response.    Left calf veins: The visualized calf veins are patent.    Contralateral CFV: The contralateral (right) common femoral vein is patent and free of thrombus.    Miscellaneous: There is a 3.6 x 4.0 x 2.1 cm anechoic structure in the left popliteal fossa suggestive of popliteal fossa cyst.                                       X-Ray Foot Complete Left (Final result)  Result time 11/14/24 20:44:48      Final result by Christopher Marquez MD (11/14/24 20:44:48)                   Impression:      As above.      Electronically signed by: Christopher Marquez MD  Date:    11/14/2024  Time:    20:44               Narrative:    EXAMINATION:  XR FOOT COMPLETE 3 VIEW LEFT    CLINICAL HISTORY:  .  Pain in unspecified foot    TECHNIQUE:  AP, lateral and oblique views of the left foot were performed.    COMPARISON:  Bilateral foot radiograph series 02/02/2023    FINDINGS:  There is no definite radiographic evidence of acute displaced fracture of the left foot.  There is no evidence of dislocation.  There is new mild subchondral cystic/erosive change involving the midfoot articulations with some degree of bony proliferation compared to prior study of 02/02/2023.  There is moderate  diffuse soft tissue edema throughout the dorsal and plantar soft tissues of the left foot.  Findings are nonspecific although could potentially represent early neuropathic arthropathy.  Infectious etiology not entirely excluded although felt less likely given lack of appreciated soft tissue ulcer.  Correlation with inflammatory markers and CBC advised.                                       X-Ray Ankle Complete Left (Final result)  Result time 11/14/24 20:45:06      Final result by Christopher Marquez MD (11/14/24 20:45:06)                   Impression:      No definite radiographic evidence of acute displaced fracture or dislocation of the left ankle.      Electronically signed by: Christopher Marquez MD  Date:    11/14/2024  Time:    20:45               Narrative:    EXAMINATION:  XR ANKLE COMPLETE 3 VIEW LEFT    CLINICAL HISTORY:  Other specified soft tissue disorders    TECHNIQUE:  AP, lateral and oblique views of the left ankle were performed.    COMPARISON:  Bilateral foot radiograph series 02/02/2023, left tibia and fibula radiograph series 10/14/2022    FINDINGS:  No definite radiographic evidence of acute displaced fracture or dislocation of the left ankle.  The ankle mortise appears maintained and symmetric.  There is calcaneal enthesopathic change at the plantar fascia attachment.  There is nonspecific diffuse soft tissue edema involving the distal foreleg and left foot.                                       Medications   oxyCODONE-acetaminophen 5-325 mg per tablet 1 tablet (1 tablet Oral Given 11/14/24 2017)     Medical Decision Making  Amount and/or Complexity of Data Reviewed  External Data Reviewed: notes.  Labs: ordered. Decision-making details documented in ED Course.  Radiology: ordered and independent interpretation performed. Decision-making details documented in ED Course.    Risk  Prescription drug management.                     Pt presents to ED as above.  Vitals stable.  Differential includes  chronic peripheral/lymphedema, cellulitis, DVT, CHF.  All labs reviewed by me and significant for WBC count 12.7, Hb 12.2, stable electrolytes and renal fxn, elevated CRP and ESR above most recent priors 2 weeks ago.  BNP WNL.  XR of the foot and ankle obtained.  Per rads read, There is no definite radiographic evidence of acute displaced fracture of the left foot. There is no evidence of dislocation. There is new mild subchondral cystic/erosive change involving the midfoot articulations with some degree of bony proliferation compared to prior study of 02/02/2023. There is moderate diffuse soft tissue edema throughout the dorsal and plantar soft tissues of the left foot. Findings are nonspecific although could potentially represent early neuropathic arthropathy. Infectious etiology not entirely excluded although felt less likely given lack of appreciated soft tissue ulcer.   LLE DVT study obtained shows no DVT.  Will provide a short course of pain medication and cover w/ abx for possible developing cellulitis given elevated WBC count and inflammatory markers.  Recommend close f/u w/ PCP and podiatry.  Has vascular eval next week.  ED return precautions discussed and provided.                 Clinical Impression:  Final diagnoses:  [M25.579] Ankle pain  [M79.89] Leg swelling (Primary)  [M79.673] Foot pain  [L03.116] Cellulitis of left lower extremity          ED Disposition Condition    Discharge Stable          ED Prescriptions       Medication Sig Dispense Start Date End Date Auth. Provider    HYDROcodone-acetaminophen (NORCO) 5-325 mg per tablet Take 1 tablet by mouth every 6 (six) hours as needed for Pain. 12 tablet 11/14/2024 -- Bri Coleman MD    cephALEXin (KEFLEX) 500 MG capsule Take 1 capsule (500 mg total) by mouth every 6 (six) hours. for 7 days 28 capsule 11/14/2024 11/21/2024 Bri Coleman MD          Follow-up Information       Follow up With Specialties Details Why Contact Info Additional  Information    Werner Patel, NP Family Medicine Schedule an appointment as soon as possible for a visit   [address]  [address]  [facility] [address]  [phone]       [facility] - Emergency Dept Emergency Medicine  As needed, If symptoms worsen [address] [facility] [address]  [phone] 1st floor             Bri Coleman MD  11/15/24 3325

## 2024-12-17 ENCOUNTER — OFFICE VISIT (OUTPATIENT)
Dept: FAMILY MEDICINE | Facility: CLINIC | Age: 59
End: 2024-12-17
Payer: MEDICAID

## 2024-12-17 VITALS
WEIGHT: 315 LBS | HEART RATE: 76 BPM | SYSTOLIC BLOOD PRESSURE: 130 MMHG | DIASTOLIC BLOOD PRESSURE: 86 MMHG | BODY MASS INDEX: 44.1 KG/M2 | OXYGEN SATURATION: 97 % | HEIGHT: 71 IN

## 2024-12-17 DIAGNOSIS — E78.2 HYPERLIPIDEMIA, MIXED: ICD-10-CM

## 2024-12-17 DIAGNOSIS — E11.9 TYPE 2 DIABETES MELLITUS TREATED WITHOUT INSULIN: Primary | ICD-10-CM

## 2024-12-17 DIAGNOSIS — E87.6 HYPOKALEMIA: ICD-10-CM

## 2024-12-17 DIAGNOSIS — I10 ESSENTIAL HYPERTENSION: ICD-10-CM

## 2024-12-17 DIAGNOSIS — Z12.5 SCREENING PSA (PROSTATE SPECIFIC ANTIGEN): ICD-10-CM

## 2024-12-17 PROCEDURE — 4010F ACE/ARB THERAPY RXD/TAKEN: CPT | Mod: CPTII,S$GLB,, | Performed by: NURSE PRACTITIONER

## 2024-12-17 PROCEDURE — 3008F BODY MASS INDEX DOCD: CPT | Mod: CPTII,S$GLB,, | Performed by: NURSE PRACTITIONER

## 2024-12-17 PROCEDURE — 99214 OFFICE O/P EST MOD 30 MIN: CPT | Mod: S$GLB,,, | Performed by: NURSE PRACTITIONER

## 2024-12-17 PROCEDURE — 3075F SYST BP GE 130 - 139MM HG: CPT | Mod: CPTII,S$GLB,, | Performed by: NURSE PRACTITIONER

## 2024-12-17 PROCEDURE — 3044F HG A1C LEVEL LT 7.0%: CPT | Mod: CPTII,S$GLB,, | Performed by: NURSE PRACTITIONER

## 2024-12-17 PROCEDURE — 1160F RVW MEDS BY RX/DR IN RCRD: CPT | Mod: CPTII,S$GLB,, | Performed by: NURSE PRACTITIONER

## 2024-12-17 PROCEDURE — 1159F MED LIST DOCD IN RCRD: CPT | Mod: CPTII,S$GLB,, | Performed by: NURSE PRACTITIONER

## 2024-12-17 PROCEDURE — 3079F DIAST BP 80-89 MM HG: CPT | Mod: CPTII,S$GLB,, | Performed by: NURSE PRACTITIONER

## 2024-12-17 RX ORDER — CALCIUM CARB, CITRATE, MALATE 250 MG
CAPSULE ORAL
COMMUNITY

## 2024-12-17 RX ORDER — POTASSIUM CHLORIDE 750 MG/1
10 TABLET, EXTENDED RELEASE ORAL DAILY
Qty: 90 TABLET | Refills: 0 | Status: SHIPPED | OUTPATIENT
Start: 2024-12-17

## 2024-12-17 RX ORDER — LISINOPRIL 40 MG/1
40 TABLET ORAL
COMMUNITY

## 2024-12-17 RX ORDER — METFORMIN HYDROCHLORIDE 500 MG/1
500 TABLET, EXTENDED RELEASE ORAL
Qty: 90 TABLET | Refills: 0 | Status: SHIPPED | OUTPATIENT
Start: 2024-12-17

## 2024-12-17 RX ORDER — PREGABALIN 100 MG/1
100 CAPSULE ORAL 2 TIMES DAILY
COMMUNITY

## 2024-12-17 NOTE — PROGRESS NOTES
SUBJECTIVE:      Patient ID: Be Shipman is a 59 y.o. male.    Chief Complaint: Hypertension    HPI  History of Present Illness    CHIEF COMPLAINT:  Be presents today for follow-up regarding dm mgt. He has not been here in 2  years.     DIABETES:  He has diabetes with A1C of 6.7 in October. He previously took metformin but discontinued due to perceived lack of efficacy. He is currently not on any diabetes medications and explicitly denies interest in Ozempic or similar medications.    LMEDICATIONS:  He is currently on medications prescribed by Dr. Rudd and Dr. Russell. He discontinued potassium supplementation two years ago.    SOCIAL HISTORY:  He denies current tobacco use.         Past Surgical History:   Procedure Laterality Date    CORONARY STENT PLACEMENT  08/09/2019    pt has card in [place] [facility] Dr. Grimm    HAND SURGERY Left     20 y/o, laceration repair    HERNIA REPAIR      umbilical hernia    INCISION AND DRAINAGE FOOT Right 09/13/2021    Procedure: INCISION AND DRAINAGE, FOOT;  Surgeon: Juan Jose Julio DPM;  Location: [facility] OR;  Service: Podiatry;  Laterality: Right;    left breast tumor removed (benign)       LEFT HEART CATHETERIZATION Left 08/09/2019    Procedure: Left heart cath;  Surgeon: Erik Jessica MD;  Location: [facility] CATH;  Service: Cardiology;  Laterality: Left;    LEG SURGERY Left 10/2022     Family History   Problem Relation Name Age of Onset    Heart disease Mother          CAD    Hypertension Mother      Cancer Sister          breast    Heart disease Sister  50    Heart disease Brother          MI    Stroke Brother      Heart disease Brother          MI    Heart disease Brother          MI      Social History     Socioeconomic History    Marital status:    Tobacco Use    Smoking status: Former    Smokeless tobacco: Former     Quit date: 7/22/2000    Tobacco comments:     quit 12 years ago   Substance and Sexual Activity    Alcohol use: Yes     Comment:  every day 1/2 a fifth of whiskey per day    Drug use: No    Sexual activity: Yes     Social Drivers of Health     Financial Resource Strain: Patient Declined (12/17/2024)    Overall Financial Resource Strain (CARDIA)     Difficulty of Paying Living Expenses: Patient declined   Food Insecurity: Patient Declined (12/17/2024)    Hunger Vital Sign     Worried About Running Out of Food in the Last Year: Patient declined     Ran Out of Food in the Last Year: Patient declined   Transportation Needs: No Transportation Needs (10/15/2022)    PRAPARE - Transportation     Lack of Transportation (Medical): No     Lack of Transportation (Non-Medical): No   Physical Activity: Inactive (12/17/2024)    Exercise Vital Sign     Days of Exercise per Week: 0 days     Minutes of Exercise per Session: 0 min   Stress: Patient Declined (12/17/2024)    [employer] [place] [employer] - Occupational Stress Questionnaire     Feeling of Stress : Patient declined   Housing Stability: Unknown (12/17/2024)    Housing Stability Vital Sign     Unable to Pay for Housing in the Last Year: Patient declined     Current Outpatient Medications   Medication Sig Dispense Refill    atenoloL (TENORMIN) 50 MG tablet Take 50 mg by mouth every evening.      atenoloL-chlorthalidone (TENORETIC) 100-25 mg per tablet Take 1 tablet by mouth once daily.      atorvastatin (LIPITOR) 40 MG tablet TAKE 1 TABLET BY MOUTH EVERY DAY 90 tablet 0    esomeprazole (NEXIUM) 40 MG capsule Take 40 mg by mouth before breakfast.      furosemide (LASIX) 20 MG tablet Take 1 tablet (20 mg total) by mouth once daily. 90 tablet 0    nitroGLYCERIN (NITROSTAT) 0.4 MG SL tablet Place 1 tablet (0.4 mg total) under the tongue every 5 (five) minutes as needed for Chest pain. 25 tablet 2    potassium citrate 99 mg Cap Take by mouth.      pregabalin (LYRICA) 100 MG capsule Take 100 mg by mouth 2 (two) times daily.      lisinopriL (PRINIVIL,ZESTRIL) 40 MG tablet Take 40 mg by mouth.  (Patient not taking: Reported on 12/17/2024)      metFORMIN (GLUCOPHAGE-XR) 500 MG ER 24hr tablet Take 1 tablet (500 mg total) by mouth daily with breakfast. 90 tablet 0    potassium chloride SA (K-DUR,KLOR-CON M) 10 MEQ tablet Take 1 tablet (10 mEq total) by mouth once daily. 90 tablet 0     No current facility-administered medications for this visit.     Review of patient's allergies indicates:   Allergen Reactions    Tetanus vaccines and toxoid Anaphylaxis, Hives and Rash      Past Medical History:   Diagnosis Date    Anticoagulant long-term use     Cellulitis of left leg 10/2022    Coronary artery disease     Diabetes mellitus     Hyperlipidemia     Hypertension     Morbid obesity      Past Surgical History:   Procedure Laterality Date    CORONARY STENT PLACEMENT  08/09/2019    pt [facility] card [facility] Dr. Grimm    HAND SURGERY Left     22 y/o, laceration repair    HERNIA REPAIR      umbilical hernia    INCISION AND DRAINAGE FOOT Right 09/13/2021    Procedure: INCISION AND DRAINAGE, FOOT;  Surgeon: Juan Jose Julio DPM;  Location: [facility] OR;  Service: Podiatry;  Laterality: Right;    left breast tumor removed (benign)       LEFT HEART CATHETERIZATION Left 08/09/2019    Procedure: Left heart cath;  Surgeon: Erik Jessica MD;  Location: [facility] CATH;  Service: Cardiology;  Laterality: Left;    LEG SURGERY Left 10/2022       Review of Systems   Constitutional:  Negative for activity change, appetite change, chills, diaphoresis and unexpected weight change.   HENT:  Negative for ear discharge, facial swelling, hearing loss, nosebleeds and trouble swallowing.    Eyes:  Negative for photophobia, pain, discharge and visual disturbance.   Respiratory:  Negative for apnea, choking, chest tightness, shortness of breath and wheezing.    Cardiovascular:  Negative for chest pain and palpitations.   Gastrointestinal:  Negative for abdominal pain, blood in stool, constipation, diarrhea and vomiting.   Endocrine:  "Negative for polyphagia.   Genitourinary:  Negative for difficulty urinating and hematuria.   Musculoskeletal:  Negative for gait problem and joint swelling.   Skin:  Negative for pallor.   Neurological:  Negative for dizziness, seizures, speech difficulty, weakness and headaches.   Hematological:  Does not bruise/bleed easily.   Psychiatric/Behavioral:  Negative for agitation, confusion, dysphoric mood, self-injury, sleep disturbance and suicidal ideas. The patient is not nervous/anxious.       OBJECTIVE:      Vitals:    12/17/24 1433 12/17/24 1456   BP: (!) 164/86 130/86   Pulse: 76    SpO2: 97%    Weight: (!) 161.9 kg (357 lb)    Height: 5' 11" (1.803 m)      Physical Exam  Vitals and nursing note reviewed.   Constitutional:       General: He is not in acute distress.     Appearance: He is well-developed.   HENT:      Head: Normocephalic and atraumatic.      Nose: Nose normal.      Mouth/Throat:      Pharynx: Uvula midline.   Eyes:      General: Lids are normal.      Conjunctiva/sclera: Conjunctivae normal.      Pupils: Pupils are equal, round, and reactive to light.      Right eye: Pupil is round and reactive.      Left eye: Pupil is round and reactive.   Neck:      Thyroid: No thyromegaly.      Vascular: No carotid bruit.   Cardiovascular:      Rate and Rhythm: Normal rate and regular rhythm.      Pulses: Normal pulses.      Heart sounds: Normal heart sounds.   Pulmonary:      Effort: Pulmonary effort is normal.      Breath sounds: Normal breath sounds. No wheezing, rhonchi or rales.   Abdominal:      General: Bowel sounds are normal.      Palpations: Abdomen is soft. Abdomen is not rigid.      Tenderness: There is no abdominal tenderness.   Musculoskeletal:         General: Normal range of motion.      Cervical back: Normal range of motion and neck supple.      Right lower leg: No edema.      Left lower leg: No edema.   Lymphadenopathy:      Cervical: No cervical adenopathy.   Skin:     General: Skin is warm " and dry.      Nails: There is no clubbing.   Neurological:      Mental Status: He is alert and oriented to person, place, and time.   Psychiatric:         Mood and Affect: Mood normal.         Speech: Speech normal.         Behavior: Behavior normal. Behavior is cooperative.         Thought Content: Thought content normal.         Judgment: Judgment normal.       No visits with results within 1 Month(s) from this visit.   Latest known visit with results is:   Admission on 11/14/2024, Discharged on 11/14/2024   Component Date Value Ref Range Status    WBC 11/14/2024 12.75 (H)  3.90 - 12.70 K/uL Final    RBC 11/14/2024 4.43 (L)  4.60 - 6.20 M/uL Final    Hemoglobin 11/14/2024 12.2 (L)  14.0 - 18.0 g/dL Final    Hematocrit 11/14/2024 38.4 (L)  40.0 - 54.0 % Final    MCV 11/14/2024 87  82 - 98 fL Final    MCH 11/14/2024 27.5  27.0 - 31.0 pg Final    MCHC 11/14/2024 31.8 (L)  32.0 - 36.0 g/dL Final    RDW 11/14/2024 16.6 (H)  11.5 - 14.5 % Final    Platelets 11/14/2024 226  150 - 450 K/uL Final    MPV 11/14/2024 10.5  9.2 - 12.9 fL Final    Immature Granulocytes 11/14/2024 0.3  0.0 - 0.5 % Final    Gran # (ANC) 11/14/2024 8.6 (H)  1.8 - 7.7 K/uL Final    Immature Grans (Abs) 11/14/2024 0.04  0.00 - 0.04 K/uL Final    Comment: Mild elevation in immature granulocytes is non specific and   can be seen in a variety of conditions including stress response,   acute inflammation, trauma and pregnancy. Correlation with other   laboratory and clinical findings is essential.      Lymph # 11/14/2024 2.7  1.0 - 4.8 K/uL Final    Mono # 11/14/2024 1.1 (H)  0.3 - 1.0 K/uL Final    Eos # 11/14/2024 0.3  0.0 - 0.5 K/uL Final    Baso # 11/14/2024 0.08  0.00 - 0.20 K/uL Final    nRBC 11/14/2024 0  0 /100 WBC Final    Gran % 11/14/2024 67.1  38.0 - 73.0 % Final    Lymph % 11/14/2024 20.8  18.0 - 48.0 % Final    Mono % 11/14/2024 8.7  4.0 - 15.0 % Final    Eosinophil % 11/14/2024 2.5  0.0 - 8.0 % Final    Basophil % 11/14/2024 0.6  0.0 -  1.9 % Final    Differential Method 11/14/2024 Automated   Final    Sodium 11/14/2024 138  136 - 145 mmol/L Final    Potassium 11/14/2024 3.5  3.5 - 5.1 mmol/L Final    Chloride 11/14/2024 99  95 - 110 mmol/L Final    CO2 11/14/2024 26  23 - 29 mmol/L Final    Glucose 11/14/2024 166 (H)  70 - 110 mg/dL Final    BUN 11/14/2024 28 (H)  6 - 20 mg/dL Final    Creatinine 11/14/2024 1.1  0.5 - 1.4 mg/dL Final    Calcium 11/14/2024 9.5  8.7 - 10.5 mg/dL Final    Total Protein 11/14/2024 7.1  6.0 - 8.4 g/dL Final    Albumin 11/14/2024 3.9  3.5 - 5.2 g/dL Final    Total Bilirubin 11/14/2024 0.4  0.1 - 1.0 mg/dL Final    Comment: For infants and newborns, interpretation of results should be based  on gestational age, weight and in agreement with clinical  observations.    Premature Infant recommended reference ranges:  Up to 24 hours.............<8.0 mg/dL  Up to 48 hours............<12.0 mg/dL  3-5 days..................<15.0 mg/dL  6-29 days.................<15.0 mg/dL      Alkaline Phosphatase 11/14/2024 81  55 - 135 U/L Final    AST 11/14/2024 22  10 - 40 U/L Final    ALT 11/14/2024 17  10 - 44 U/L Final    eGFR 11/14/2024 >60.0  >60 mL/min/1.73 m^2 Final    Anion Gap 11/14/2024 13  8 - 16 mmol/L Final    BNP 11/14/2024 26  0 - 99 pg/mL Final    Values of less than 100 pg/ml are consistent with non-CHF populations.    CRP 11/14/2024 4.60 (H)  <1.00 mg/dL Final    Comment: CRP-Normal Application expected values:   <1.0        mg/dL   Normal Range  1.0 - 5.0  mg/dL   Indicates mild inflammation  5.0 - 10.0 mg/dL   Indicates severe inflammation  >10.0        mg/dL   Represents serious processes and   frequently         indicates the presence of a bacterial   infection.       Sed Rate 11/14/2024 55 (H)  0 - 10 mm/Hr Final      Assessment:       1. Type 2 diabetes mellitus treated without insulin    2. Hyperlipidemia, mixed    3. Essential hypertension    4. Screening PSA (prostate specific antigen)    5. Hypokalemia         Plan:       Type 2 diabetes mellitus treated without insulin  -     Microalbumin/Creatinine Ratio, Urine; Future; Expected date: 12/17/2024  -     metFORMIN (GLUCOPHAGE-XR) 500 MG ER 24hr tablet; Take 1 tablet (500 mg total) by mouth daily with breakfast.  Dispense: 90 tablet; Refill: 0  -     BASIC METABOLIC PANEL; Future; Expected date: 12/17/2024  -     Urinalysis; Future; Expected date: 12/17/2024    Hyperlipidemia, mixed  Stable    Essential hypertension  Stable    Screening PSA (prostate specific antigen)  -     PSA, Screening; Future; Expected date: 12/31/2024    Hypokalemia  -     potassium chloride SA (K-DUR,KLOR-CON M) 10 MEQ tablet; Take 1 tablet (10 mEq total) by mouth once daily.  Dispense: 90 tablet; Refill: 0  -     BASIC METABOLIC PANEL; Future; Expected date: 12/17/2024      Assessment & Plan    TYPE 2 DIABETES MELLITUS:  - Assessed uncontrolled diabetes with A1C of 6.7 in October.  - Recommend initiation of metformin for diabetes management.  - Educated patient on diabetic status despite not taking diabetes medication.  - Explained importance of managing diabetes and potential complications.  - Discussed metformin as first-line treatment for diabetes   - Started metformin (low dose, daily) for diabetes management.  - Ordered labs to be completed after starting metformin and before next follow-up visit.  - Ordered urine sample to be provided during labs visit.    DIABETIC POLYNEUROPATHY:  - Acknowledged current use of Lyrica 100mg BID for neuropathy, prescribed by Yvonne (neurologist).    OBESITY:  - Be to address weight management.    ELECTROLYTE IMBALANCE:  - Noted slightly low potassium levels.  - Refilled potassium for low levels.      FOLLOW-UP:  - Follow up in 3 months.  - Complete labs before next follow-up visit.         Follow up in about 3 months (around 3/17/2025) for dm.  This note was generated with the assistance of ambient listening technology. Verbal consent was obtained by the  patient and accompanying visitor(s) for the recording of patient appointment to facilitate this note. I attest to having reviewed and edited the generated note for accuracy, though some syntax or spelling errors may persist. Please contact the author of this note for any clarification.        12/17/2024 SHELBI Maciel, ORESTESP

## 2024-12-17 NOTE — LETTER
AUTHORIZATION FOR RELEASE OF   CONFIDENTIAL INFORMATION    Dear Dr. Praul Christopher    We are seeing Be Shipman, date of birth 1965, in the clinic at [facility]. Werner Patel NP is the patient's PCP. Be Shipman has an outstanding lab/procedure at the time we reviewed his chart. In order to help keep his health information updated, he has authorized us to request the following medical record(s):        (  )  MAMMOGRAM                                      (  )  COLONOSCOPY      (  )  PAP SMEAR                                          (  )  OUTSIDE LAB RESULTS     (  )  DEXA SCAN                                          ( x )  EYE EXAM            (  )  FOOT EXAM                                          (  )  ENTIRE RECORD     (  )  OUTSIDE IMMUNIZATIONS                 (  )  _______________         Please fax records to [facility], Cox, Casey H., NP, [phone]     If you have any questions, please contact Lily at [phone].           Patient Name: Be Shipman  : 1965  Patient Phone #: [phone]

## 2024-12-26 ENCOUNTER — HOSPITAL ENCOUNTER (EMERGENCY)
Facility: HOSPITAL | Age: 59
Discharge: HOME | End: 2024-12-26
Attending: EMERGENCY MEDICINE
Payer: MEDICAID

## 2024-12-26 VITALS
HEIGHT: 71 IN | DIASTOLIC BLOOD PRESSURE: 70 MMHG | SYSTOLIC BLOOD PRESSURE: 150 MMHG | RESPIRATION RATE: 16 BRPM | OXYGEN SATURATION: 95 % | TEMPERATURE: 99 F | HEART RATE: 81 BPM | WEIGHT: 315 LBS | BODY MASS INDEX: 44.1 KG/M2

## 2024-12-26 DIAGNOSIS — M25.569 PAIN AND SWELLING OF KNEE: ICD-10-CM

## 2024-12-26 DIAGNOSIS — M25.469 PAIN AND SWELLING OF KNEE: ICD-10-CM

## 2024-12-26 DIAGNOSIS — M79.606 PAIN AND SWELLING OF LOWER EXTREMITY: ICD-10-CM

## 2024-12-26 DIAGNOSIS — M79.89 PAIN AND SWELLING OF LOWER EXTREMITY: ICD-10-CM

## 2024-12-26 DIAGNOSIS — M10.9 GOUT, UNSPECIFIED CAUSE, UNSPECIFIED CHRONICITY, UNSPECIFIED SITE: ICD-10-CM

## 2024-12-26 DIAGNOSIS — E86.0 DEHYDRATION: Primary | ICD-10-CM

## 2024-12-26 DIAGNOSIS — M71.22 BAKER'S CYST OF KNEE, LEFT: ICD-10-CM

## 2024-12-26 LAB
ALBUMIN SERPL BCP-MCNC: 3.9 G/DL (ref 3.5–5.2)
ALP SERPL-CCNC: 102 U/L (ref 55–135)
ALT SERPL W/O P-5'-P-CCNC: 28 U/L (ref 10–44)
ANION GAP SERPL CALC-SCNC: 11 MMOL/L (ref 8–16)
AST SERPL-CCNC: 20 U/L (ref 10–40)
BASOPHILS # BLD AUTO: 0.06 K/UL (ref 0–0.2)
BASOPHILS NFR BLD: 0.4 % (ref 0–1.9)
BILIRUB SERPL-MCNC: 0.6 MG/DL (ref 0.1–1)
BNP SERPL-MCNC: 53 PG/ML (ref 0–99)
BUN SERPL-MCNC: 40 MG/DL (ref 6–20)
CALCIUM SERPL-MCNC: 9.9 MG/DL (ref 8.7–10.5)
CHLORIDE SERPL-SCNC: 96 MMOL/L (ref 95–110)
CO2 SERPL-SCNC: 31 MMOL/L (ref 23–29)
CREAT SERPL-MCNC: 1.8 MG/DL (ref 0.5–1.4)
DIFFERENTIAL METHOD BLD: ABNORMAL
EOSINOPHIL # BLD AUTO: 0.2 K/UL (ref 0–0.5)
EOSINOPHIL NFR BLD: 1.6 % (ref 0–8)
ERYTHROCYTE [DISTWIDTH] IN BLOOD BY AUTOMATED COUNT: 16.5 % (ref 11.5–14.5)
ERYTHROCYTE [SEDIMENTATION RATE] IN BLOOD BY WESTERGREN METHOD: >90 MM/HR (ref 0–10)
EST. GFR  (NO RACE VARIABLE): 43.1 ML/MIN/1.73 M^2
GLUCOSE SERPL-MCNC: 161 MG/DL (ref 70–110)
HCT VFR BLD AUTO: 37.6 % (ref 40–54)
HGB BLD-MCNC: 11.8 G/DL (ref 14–18)
IMM GRANULOCYTES # BLD AUTO: 0.05 K/UL (ref 0–0.04)
IMM GRANULOCYTES NFR BLD AUTO: 0.4 % (ref 0–0.5)
LYMPHOCYTES # BLD AUTO: 2.5 K/UL (ref 1–4.8)
LYMPHOCYTES NFR BLD: 18.3 % (ref 18–48)
MCH RBC QN AUTO: 27.6 PG (ref 27–31)
MCHC RBC AUTO-ENTMCNC: 31.4 G/DL (ref 32–36)
MCV RBC AUTO: 88 FL (ref 82–98)
MONOCYTES # BLD AUTO: 1.2 K/UL (ref 0.3–1)
MONOCYTES NFR BLD: 9 % (ref 4–15)
NEUTROPHILS # BLD AUTO: 9.7 K/UL (ref 1.8–7.7)
NEUTROPHILS NFR BLD: 70.3 % (ref 38–73)
NRBC BLD-RTO: 0 /100 WBC
PLATELET # BLD AUTO: 419 K/UL (ref 150–450)
PMV BLD AUTO: 9.8 FL (ref 9.2–12.9)
POTASSIUM SERPL-SCNC: 3.3 MMOL/L (ref 3.5–5.1)
PROT SERPL-MCNC: 7.8 G/DL (ref 6–8.4)
RBC # BLD AUTO: 4.27 M/UL (ref 4.6–6.2)
SODIUM SERPL-SCNC: 138 MMOL/L (ref 136–145)
URATE SERPL-MCNC: 11.6 MG/DL (ref 3.4–7)
WBC # BLD AUTO: 13.77 K/UL (ref 3.9–12.7)

## 2024-12-26 PROCEDURE — 99285 EMERGENCY DEPT VISIT HI MDM: CPT | Mod: 25

## 2024-12-26 PROCEDURE — 85025 COMPLETE CBC W/AUTO DIFF WBC: CPT | Performed by: EMERGENCY MEDICINE

## 2024-12-26 PROCEDURE — 80053 COMPREHEN METABOLIC PANEL: CPT | Performed by: EMERGENCY MEDICINE

## 2024-12-26 PROCEDURE — 83880 ASSAY OF NATRIURETIC PEPTIDE: CPT | Performed by: EMERGENCY MEDICINE

## 2024-12-26 PROCEDURE — 63600175 PHARM REV CODE 636 W HCPCS: Performed by: EMERGENCY MEDICINE

## 2024-12-26 PROCEDURE — 25000003 PHARM REV CODE 250: Performed by: EMERGENCY MEDICINE

## 2024-12-26 PROCEDURE — 84550 ASSAY OF BLOOD/URIC ACID: CPT | Performed by: EMERGENCY MEDICINE

## 2024-12-26 PROCEDURE — 96374 THER/PROPH/DIAG INJ IV PUSH: CPT

## 2024-12-26 PROCEDURE — 85651 RBC SED RATE NONAUTOMATED: CPT | Performed by: EMERGENCY MEDICINE

## 2024-12-26 RX ORDER — OXYCODONE AND ACETAMINOPHEN 5; 325 MG/1; MG/1
1 TABLET ORAL
Status: COMPLETED | OUTPATIENT
Start: 2024-12-26 | End: 2024-12-26

## 2024-12-26 RX ORDER — METHYLPREDNISOLONE SOD SUCC 125 MG
125 VIAL (EA) INJECTION
Status: COMPLETED | OUTPATIENT
Start: 2024-12-26 | End: 2024-12-26

## 2024-12-26 RX ORDER — POTASSIUM CHLORIDE 750 MG/1
10 CAPSULE, EXTENDED RELEASE ORAL ONCE
Status: COMPLETED | OUTPATIENT
Start: 2024-12-26 | End: 2024-12-26

## 2024-12-26 RX ORDER — OXYCODONE AND ACETAMINOPHEN 5; 325 MG/1; MG/1
1 TABLET ORAL EVERY 4 HOURS PRN
Qty: 12 TABLET | Refills: 0 | Status: SHIPPED | OUTPATIENT
Start: 2024-12-26

## 2024-12-26 RX ORDER — SODIUM CHLORIDE 9 MG/ML
500 INJECTION, SOLUTION INTRAVENOUS
Status: COMPLETED | OUTPATIENT
Start: 2024-12-26 | End: 2024-12-26

## 2024-12-26 RX ADMIN — METHYLPREDNISOLONE SODIUM SUCCINATE 125 MG: 125 INJECTION, POWDER, FOR SOLUTION INTRAMUSCULAR; INTRAVENOUS at 12:12

## 2024-12-26 RX ADMIN — POTASSIUM CHLORIDE 10 MEQ: 750 CAPSULE, EXTENDED RELEASE ORAL at 11:12

## 2024-12-26 RX ADMIN — OXYCODONE HYDROCHLORIDE AND ACETAMINOPHEN 1 TABLET: 5; 325 TABLET ORAL at 10:12

## 2024-12-26 RX ADMIN — SODIUM CHLORIDE 500 ML: 9 INJECTION, SOLUTION INTRAVENOUS at 12:12

## 2024-12-26 NOTE — DISCHARGE INSTRUCTIONS
Percocet as directed if needed for pain  Please follow-up with your primary care provider and orthopedics as directed for further evaluation, definitive care  Recommend repeat labs to ensure in the a your kidney functions have returned back to normal please continue to hydrate yourself especially while taking your Lasix  Return if condition becomes worse or for any concerns  
Yes

## 2024-12-26 NOTE — ED PROVIDER NOTES
Encounter Date: 12/26/2024       History     Chief Complaint   Patient presents with    Knee Pain     Bilat x 2 weeks, no injury     Patient with PMH DM, HLD, hypertension, morbid obesity, chronic lower extremity edema, recurrent cellulitis left lower extremity, CAD with previous stent presents to ED with c/o nontraumatic pain to both of his knees.  Patient states that this is been present for last 2 weeks reports the pain is to the patellar region of both of his knees he reports that ambulation, movement makes the pain worse.  Patient was recently seen in the emergency department in November and treated for cellulitis he does have bilateral pitting edema he denies any chest pain or shortness of breath he reports that this is chronic for him.  Patient states he took a Norco last p.m. without relief of symptoms    The history is provided by the patient.     Review of patient's allergies indicates:   Allergen Reactions    Tetanus vaccines and toxoid Anaphylaxis, Hives and Rash     Past Medical History:   Diagnosis Date    Anticoagulant long-term use     Cellulitis of left leg 10/2022    Coronary artery disease     Diabetes mellitus     Hyperlipidemia     Hypertension     Morbid obesity      Past Surgical History:   Procedure Laterality Date    CORONARY STENT PLACEMENT  08/09/2019    pt has card in [facility] Dr. Grimm    HAND SURGERY Left     22 y/o, laceration repair    HERNIA REPAIR      umbilical hernia    INCISION AND DRAINAGE FOOT Right 09/13/2021    Procedure: INCISION AND DRAINAGE, FOOT;  Surgeon: Juan Jose Julio DPM;  Location: [facility] OR;  Service: Podiatry;  Laterality: Right;    left breast tumor removed (benign)       LEFT HEART CATHETERIZATION Left 08/09/2019    Procedure: Left heart cath;  Surgeon: Erik Jessica MD;  Location: [facility] CATH;  Service: Cardiology;  Laterality: Left;    LEG SURGERY Left 10/2022     Family History   Problem Relation Name Age of Onset    Heart disease Mother           CAD    Hypertension Mother      Cancer Sister          breast    Heart disease Sister  50    Heart disease Brother          MI    Stroke Brother      Heart disease Brother          MI    Heart disease Brother          MI     Social History     Tobacco Use    Smoking status: Former    Smokeless tobacco: Former     Quit date: 7/22/2000    Tobacco comments:     quit 12 years ago   Substance Use Topics    Alcohol use: Yes     Comment: every day 1/2 a fifth of whiskey per day    Drug use: No     Review of Systems   Constitutional: Negative.  Negative for chills and fever.   HENT: Negative.     Respiratory: Negative.     Cardiovascular: Negative.    Gastrointestinal: Negative.    Genitourinary: Negative.    Musculoskeletal:         Bilateral knee pain    Hematological: Negative.    Psychiatric/Behavioral: Negative.     All other systems reviewed and are negative.      Physical Exam     Initial Vitals [12/26/24 0942]   BP Pulse Resp Temp SpO2   (!) 153/72 98 20 98.7 °F (37.1 °C) 97 %      MAP       --         Physical Exam    Nursing note and vitals reviewed.  Constitutional: He appears well-developed and well-nourished.   HENT:   Head: Normocephalic and atraumatic.   Eyes: Conjunctivae and EOM are normal. Pupils are equal, round, and reactive to light.   Neck: Neck supple.   Normal range of motion.  Cardiovascular:  Normal rate, regular rhythm, normal heart sounds and intact distal pulses.           Pulmonary/Chest: Breath sounds normal. No respiratory distress. He exhibits no tenderness.   Abdominal: Abdomen is soft. Bowel sounds are normal.   Musculoskeletal:      Cervical back: Normal range of motion and neck supple.      Right knee: Swelling present. Decreased range of motion. Tenderness present over the patellar tendon.      Left knee: Swelling present. Decreased range of motion. Tenderness present over the patellar tendon.      Comments: Patient has swelling to the bilateral lower extremities, and pitting edema.   Patient reports this is chronic he has bilateral knee braces on which appeared to be very tight maybe causing increased swelling to the lower extremities.     Neurological: He is alert and oriented to person, place, and time. He has normal strength. GCS score is 15. GCS eye subscore is 4. GCS verbal subscore is 5. GCS motor subscore is 6.   Skin: Skin is warm.         ED Course   Procedures  Labs Reviewed   CBC W/ AUTO DIFFERENTIAL - Abnormal       Result Value    WBC 13.77 (*)     RBC 4.27 (*)     Hemoglobin 11.8 (*)     Hematocrit 37.6 (*)     MCV 88      MCH 27.6      MCHC 31.4 (*)     RDW 16.5 (*)     Platelets 419      MPV 9.8      Immature Granulocytes 0.4      Gran # (ANC) 9.7 (*)     Immature Grans (Abs) 0.05 (*)     Lymph # 2.5      Mono # 1.2 (*)     Eos # 0.2      Baso # 0.06      nRBC 0      Gran % 70.3      Lymph % 18.3      Mono % 9.0      Eosinophil % 1.6      Basophil % 0.4      Differential Method Automated     COMPREHENSIVE METABOLIC PANEL - Abnormal    Sodium 138      Potassium 3.3 (*)     Chloride 96      CO2 31 (*)     Glucose 161 (*)     BUN 40 (*)     Creatinine 1.8 (*)     Calcium 9.9      Total Protein 7.8      Albumin 3.9      Total Bilirubin 0.6      Alkaline Phosphatase 102      AST 20      ALT 28      eGFR 43.1 (*)     Anion Gap 11     URIC ACID - Abnormal    Uric Acid 11.6 (*)    SEDIMENTATION RATE - Abnormal    Sed Rate >90 (*)    B-TYPE NATRIURETIC PEPTIDE    BNP 53            Imaging Results              US Lower Extremity Veins Bilateral (Final result)  Result time 12/26/24 10:48:47      Final result by Chavo Armijo IV, MD (12/26/24 10:48:47)                   Impression:      No evidence of deep vein thrombosis involving the bilateral lower extremities.    Complex left popliteal fossa cyst.      Electronically signed by: Chavo Armijo  Date:    12/26/2024  Time:    10:48               Narrative:    EXAMINATION:  US LOWER EXTREMITY VEINS BILATERAL    CLINICAL HISTORY:  Pain in leg,  unspecified    COMPARISON:  None.    FINDINGS:  Real-time, duplex and color Doppler interrogation is performed. This demonstrates patency of the common and superficial femoral veins, popliteal veins, major calf veins and greater saphenous vein bilaterally. There are no findings of deep vein thrombosis.    Incidental observation is made of a complex left popliteal fossa cyst measuring approximately 7 x 5 x 3.4 cm containing a somewhat thick wall and low level internal echoes.                                       X-Ray Knee 3 View Bilateral (Final result)  Result time 12/26/24 10:23:58      Final result by Nona Villela MD (12/26/24 10:23:58)                   Impression:      Mild osteoarthritic changes of the medial femoral tibial joints bilaterally    Small bilateral suprapatella joint effusions    No acute osseous abnormality      Electronically signed by: Nona Vlilela  Date:    12/26/2024  Time:    10:23               Narrative:    EXAMINATION:  XR KNEE 3 VIEW BILATERAL    CLINICAL HISTORY:  Pain in unspecified knee    FINDINGS:  Three views of the bilateral knees show no fractures, dislocations or acute osseous abnormalities.  There is mild joint space narrowing of the medial femoral tibial joints bilaterally.  There are bilateral small suprapatellar joint effusions..                                       Medications   oxyCODONE-acetaminophen 5-325 mg per tablet 1 tablet (1 tablet Oral Given 12/26/24 1002)   potassium chloride CR capsule 10 mEq (10 mEq Oral Given 12/26/24 1133)   methylPREDNISolone sodium succinate injection 125 mg (125 mg Intravenous Given 12/26/24 1206)   0.9% NaCl infusion (500 mLs Intravenous New Bag 12/26/24 1206)     Medical Decision Making  Patient with PMH DM, HLD, hypertension, morbid obesity, chronic lower extremity edema, recurrent cellulitis left lower extremity, CAD with previous stent presents to ED with c/o nontraumatic pain to both of his knees.  Patient states that  this is been present for last 2 weeks reports the pain is to the patellar region of both of his knees he reports that ambulation, movement makes the pain worse.  Patient was recently seen in the emergency department in November and treated for cellulitis he does have bilateral pitting edema he denies any chest pain or shortness of breath he reports that this is chronic for him.  Patient states he took a Norco last p.m. without relief of symptoms    The history is provided by the patient.     Considerations include but not limited to, CHF exacerbation, cellulitis, hydration, electrolyte abnormalities, DVT, gout    59-year-old male presents to the  emergency department with complaint of bilateral knee pain.  Patient has bilateral lower extremity swelling and discoloration to both legs which she states is not new for him was recently treated for cellulitis.  The patient states that he has had this previously proximally 2 years ago which required injections in his knees.  The patient is has mild leukocytosis his hemoglobin and hematocrit are stable for him platelets are normal, BNP is 53 he does have a bump in his creatinine today of 1.8 he however states he has been taking his Lasix and not drinking a lot of water because of his knee pain he denies any NSAID use.  He does have diabetes his glucose is currently 161 his potassium was 3.3 he was given oral supplementation his uric acid is elevated at 11.6 as well as his sed rate suggestive of gout.  Secondary to patient's BRENDAN do not think he is in a candidate for colchicine or NSAIDs.  The patient was given a 500 cc bolus of fluids he was instructed on hydration the patient was personally evaluated by my attending physician Dr. Gonzalez who would like the patient to have a dose of IV steroids despite being a diabetic, and he will be discharged home with pain medications.  The patient was given return precautions    Amount and/or Complexity of Data Reviewed  Labs: ordered.  Decision-making details documented in ED Course.  Radiology: ordered. Decision-making details documented in ED Course.    Risk  Prescription drug management.                                      Clinical Impression:  Final diagnoses:  [M25.569, M25.469] Pain and swelling of knee  [M79.606, M79.89] Pain and swelling of lower extremity  [E86.0] Dehydration (Primary)  [M10.9] Gout, unspecified cause, unspecified chronicity, unspecified site  [M71.22] Baker's cyst of knee, left          ED Disposition Condition    Discharge Stable          ED Prescriptions       Medication Sig Dispense Start Date End Date Auth. Provider    oxyCODONE-acetaminophen (PERCOCET) 5-325 mg per tablet Take 1 tablet by mouth every 4 (four) hours as needed for Pain. 12 tablet 12/26/2024 -- Joy Graham FNP          Follow-up Information       Follow up With Specialties Details Why Contact Info    Werner Patel NP Family Medicine Schedule an appointment as soon as possible for a visit in 2 days  [address]  [address]  [place] [address]  [phone]      Chase Leslie MD Sports Medicine, Orthopedic Surgery Schedule an appointment as soon as possible for a visit in 2 days  [address] [place] [address] [address]  [place] [address]  [phone]               Joy Graham FNP  12/26/24 3134

## 2024-12-27 ENCOUNTER — TELEPHONE (OUTPATIENT)
Dept: FAMILY MEDICINE | Facility: CLINIC | Age: 59
End: 2024-12-27
Payer: MEDICAID

## 2024-12-27 DIAGNOSIS — G89.29 CHRONIC PAIN OF BOTH KNEES: Primary | ICD-10-CM

## 2024-12-27 DIAGNOSIS — M25.562 CHRONIC PAIN OF BOTH KNEES: Primary | ICD-10-CM

## 2024-12-27 DIAGNOSIS — M25.561 CHRONIC PAIN OF BOTH KNEES: Primary | ICD-10-CM

## 2024-12-27 NOTE — TELEPHONE ENCOUNTER
----- Message from George Nickerson sent at 12/27/2024 11:28 AM CST -----  Pt is returning call     Pt # [phone]

## 2024-12-27 NOTE — TELEPHONE ENCOUNTER
----- Message from Aurea sent at 12/27/2024 11:19 AM CST -----  He needs a referral to see Dr. HERRERA James. He is an orthopedic. He said both hi knee caps are out of place. Pt's # [phone] GH

## 2024-12-28 ENCOUNTER — TELEPHONE (OUTPATIENT)
Dept: EMERGENCY MEDICINE | Facility: HOSPITAL | Age: 59
End: 2024-12-28

## 2025-01-02 ENCOUNTER — PATIENT OUTREACH (OUTPATIENT)
Dept: ADMINISTRATIVE | Facility: HOSPITAL | Age: 60
End: 2025-01-02
Payer: MEDICAID

## 2025-01-18 DIAGNOSIS — E87.6 HYPOKALEMIA: ICD-10-CM

## 2025-01-21 RX ORDER — POTASSIUM CHLORIDE 750 MG/1
TABLET, EXTENDED RELEASE ORAL
Qty: 90 TABLET | Refills: 0 | Status: SHIPPED | OUTPATIENT
Start: 2025-01-21

## 2025-01-24 ENCOUNTER — OFFICE VISIT (OUTPATIENT)
Dept: ORTHOPEDICS | Facility: CLINIC | Age: 60
End: 2025-01-24
Payer: MEDICAID

## 2025-01-24 ENCOUNTER — HOSPITAL ENCOUNTER (OUTPATIENT)
Dept: RADIOLOGY | Facility: HOSPITAL | Age: 60
Discharge: HOME | End: 2025-01-24
Attending: PHYSICIAN ASSISTANT
Payer: MEDICAID

## 2025-01-24 VITALS — BODY MASS INDEX: 44.1 KG/M2 | WEIGHT: 315 LBS | HEIGHT: 71 IN

## 2025-01-24 DIAGNOSIS — M17.12 PRIMARY OSTEOARTHRITIS OF LEFT KNEE: Primary | ICD-10-CM

## 2025-01-24 DIAGNOSIS — M17.11 PRIMARY OSTEOARTHRITIS OF RIGHT KNEE: ICD-10-CM

## 2025-01-24 PROCEDURE — 73560 X-RAY EXAM OF KNEE 1 OR 2: CPT | Mod: 26,50,, | Performed by: RADIOLOGY

## 2025-01-24 PROCEDURE — 99999PBSHW PR PBB SHADOW TECHNICAL ONLY FILED TO HB: Mod: PBBFAC,,,

## 2025-01-24 PROCEDURE — 99999 PR PBB SHADOW E&M-EST. PATIENT-LVL IV: CPT | Mod: PBBFAC,,, | Performed by: PHYSICIAN ASSISTANT

## 2025-01-24 PROCEDURE — 20610 DRAIN/INJ JOINT/BURSA W/O US: CPT | Mod: S$PBB,50,, | Performed by: PHYSICIAN ASSISTANT

## 2025-01-24 PROCEDURE — 99214 OFFICE O/P EST MOD 30 MIN: CPT | Mod: PBBFAC,25,PN | Performed by: PHYSICIAN ASSISTANT

## 2025-01-24 PROCEDURE — 1160F RVW MEDS BY RX/DR IN RCRD: CPT | Mod: CPTII,,, | Performed by: PHYSICIAN ASSISTANT

## 2025-01-24 PROCEDURE — 99024 POSTOP FOLLOW-UP VISIT: CPT | Mod: ,,, | Performed by: PHYSICIAN ASSISTANT

## 2025-01-24 PROCEDURE — 73560 X-RAY EXAM OF KNEE 1 OR 2: CPT | Mod: TC,50,PN

## 2025-01-24 PROCEDURE — 20610 DRAIN/INJ JOINT/BURSA W/O US: CPT | Mod: PBBFAC,PN,RT | Performed by: PHYSICIAN ASSISTANT

## 2025-01-24 PROCEDURE — 1159F MED LIST DOCD IN RCRD: CPT | Mod: CPTII,,, | Performed by: PHYSICIAN ASSISTANT

## 2025-01-24 PROCEDURE — 20610 DRAIN/INJ JOINT/BURSA W/O US: CPT | Mod: PBBFAC,PN,LT | Performed by: PHYSICIAN ASSISTANT

## 2025-01-24 RX ORDER — TRIAMCINOLONE ACETONIDE 40 MG/ML
40 INJECTION, SUSPENSION INTRA-ARTICULAR; INTRAMUSCULAR
Status: DISCONTINUED | OUTPATIENT
Start: 2025-01-24 | End: 2025-01-24 | Stop reason: HOSPADM

## 2025-01-24 RX ADMIN — TRIAMCINOLONE ACETONIDE 40 MG: 40 INJECTION, SUSPENSION INTRA-ARTICULAR; INTRAMUSCULAR at 11:01

## 2025-01-24 NOTE — PROCEDURES
Large Joint Aspiration/Injection: R knee    Date/Time: 1/24/2025 11:45 AM    Performed by: Jonas Gong PA  Authorized by: Jonas Gong PA    Consent Done?:  Yes (Verbal)  Indications:  Pain  Site marked: the procedure site was marked    Timeout: prior to procedure the correct patient, procedure, and site was verified    Prep: patient was prepped and draped in usual sterile fashion      Local anesthesia used?: Yes    Local anesthetic:  Lidocaine 1% without epinephrine    Details:  Needle Size:  25 G  Ultrasonic Guidance for needle placement?: No    Location:  Knee  Site:  R knee  Medications:  40 mg triamcinolone acetonide 40 mg/mL  Patient tolerance:  Patient tolerated the procedure well with no immediate complications  Large Joint Aspiration/Injection: L knee    Date/Time: 1/24/2025 11:45 AM    Performed by: Jonas Gong PA  Authorized by: Jonas Gong PA    Consent Done?:  Yes (Verbal)  Indications:  Pain  Site marked: the procedure site was marked    Timeout: prior to procedure the correct patient, procedure, and site was verified    Prep: patient was prepped and draped in usual sterile fashion      Local anesthesia used?: Yes    Local anesthetic:  Lidocaine 1% without epinephrine    Details:  Needle Size:  25 G  Ultrasonic Guidance for needle placement?: No    Location:  Knee  Site:  L knee  Medications:  40 mg triamcinolone acetonide 40 mg/mL  Patient tolerance:  Patient tolerated the procedure well with no immediate complications

## 2025-01-24 NOTE — PROGRESS NOTES
[facility] ORTHOPEDICS  [address]  [place] [address]  Phone: [phone]   [phone]    Patient's PCP: Werner Patel, NP  Referring Provider: Werner Patel    Subjective:      Chief Complaint:   Chief Complaint   Patient presents with    Left Knee - Pain     Patient is here for a f/up on bilateral knee pain, states his pain is worse than his last visit, 2-3 weeks ago he went to ER and got a steroid injection that helped with the swelling.     Right Knee - Pain       Past Medical History:   Diagnosis Date    Anticoagulant long-term use     Cellulitis of left leg 10/2022    Coronary artery disease     Diabetes mellitus     Hyperlipidemia     Hypertension     Morbid obesity        Past Surgical History:   Procedure Laterality Date    CORONARY STENT PLAC[facility]  [facility]2019    [facility]    HAND SURGERY Left     22 y/o, laceration repair    HERNIA REPAIR      umbilical hernia    INCISION AND DRAINAGE FOOT Right 09/13/2021    Procedure: INCISION AND DRAINAGE, FOOT;  Surgeon: Juan Jose Julio DPM;  Location: [facility] OR;  Service: Podiatry;  Laterality: Right;    left breast tumor removed (benign)       LEFT HEART CATHETERIZATION Left 08/09/2019    Procedure: Left heart cath;  Surgeon: Erik Jessica MD;  Location: [facility] CATH;  Service: Cardiology;  Laterality: Left;    LEG SURGERY Left 10/2022       Current Outpatient Medications   Medication Sig    atenoloL (TENORMIN) 50 MG tablet Take 50 mg by mouth every evening.    atenoloL-chlorthalidone (TENORETIC) 100-25 mg per tablet Take 1 tablet by mouth once daily.    atorvastatin (LIPITOR) 40 MG tablet TAKE 1 TABLET BY MOUTH EVERY DAY    esomeprazole (NEXIUM) 40 MG capsule Take 40 mg by mouth before breakfast.    furosemide (LASIX) 20 MG tablet Take 1 tablet (20 mg total) by mouth once daily.    metFORMIN (GLUCOPHAGE-XR) 500 MG ER 24hr tablet Take 1 tablet (500 mg total) by mouth daily with breakfast.    nitroGLYCERIN  (NITROSTAT) 0.4 MG SL tablet Place 1 tablet (0.4 mg total) under the tongue every 5 (five) minutes as needed for Chest pain.    oxyCODONE-acetaminophen (PERCOCET) 5-325 mg per tablet Take 1 tablet by mouth every 4 (four) hours as needed for Pain.    potassium chloride SA (K-DUR,KLOR-CON M) 10 MEQ tablet TAKE 1 TABLET(10 MEQ) BY MOUTH DAILY    potassium citrate 99 mg Cap Take by mouth.    pregabalin (LYRICA) 100 MG capsule Take 100 mg by mouth 2 (two) times daily.    lisinopriL (PRINIVIL,ZESTRIL) 40 MG tablet Take 40 mg by mouth. (Patient not taking: Reported on 1/24/2025)     No current facility-administered medications for this visit.       Review of patient's allergies indicates:   Allergen Reactions    Tetanus vaccines and toxoid Anaphylaxis, Hives and Rash       Family History   Problem Relation Name Age of Onset    Heart disease Mother          CAD    Hypertension Mother      Cancer Sister          breast    Heart disease Sister  50    Heart disease Brother          MI    Stroke Brother      Heart disease Brother          MI    Heart disease Brother          MI       Social History     Socioeconomic History    Marital status:    Tobacco Use    Smoking status: Former    Smokeless tobacco: Former     Quit date: 7/22/2000    Tobacco comments:     quit 12 years ago   Substance and Sexual Activity    Alcohol use: Yes     Comment: every day 1/2 a fifth of whiskey per day    Drug use: No    Sexual activity: Yes     Social Drivers of Health     Financial Resource Strain: Patient Declined (12/17/2024)    Overall Financial Resource Strain (CARDIA)     Difficulty of Paying Living Expenses: Patient declined   Food Insecurity: Patient Declined (12/17/2024)    Hunger Vital Sign     Worried About Running Out of Food in the Last Year: Patient declined     Ran Out of Food in the Last Year: Patient declined   Transportation Needs: No Transportation Needs (10/15/2022)    PRAPARE - Transportation     Lack of Transportation  (Medical): No     Lack of Transportation (Non-Medical): No   Physical Activity: Inactive (12/17/2024)    Exercise Vital Sign     Days of Exercise per Week: 0 days     Minutes of Exercise per Session: 0 min   Stress: Patient Declined (12/17/2024)    [employer] - Occupational Stress Questionnaire     Feeling of Stress : Patient declined   Housing Stability: Unknown (12/17/2024)    Housing Stability Vital Sign     Unable to Pay for Housing in the Last Year: Patient declined       Prior to meeting with the patient I reviewed the medical chart in [facility]. This included reviewing the previous progress notes from our office, review of the patient's last appointment with their primary care provider, review of any visits to the emergency room, and review of any pain management appointments or procedures.    History of present illness: 59 y.o. male,  returns to clinic today for evaluation chronic bilateral knee pain.  It has been a few years since we have seen him last.  Knee pain has been worsening for the last couple of months.  Morbidly obese not a great surgical candidate he also has some peripheral vascular disease.  Left greater than right leg swelling and edema.  Takes Lasix.  He is followed by Cardiology and he has appointments vascular surgery coming up for his lower extremity swelling and edema..       Review of Systems:    Constitutional: Negative for chills, fever and weight loss.   HENT: Negative for congestion.    Eyes: Negative for discharge and redness.   Respiratory: Negative for cough and shortness of breath.    Cardiovascular: Negative for chest pain.   Gastrointestinal: Negative for nausea and vomiting.   Musculoskeletal: See HPI.   Skin: Negative for rash.   Neurological: Negative for headaches.   Endo/Heme/Allergies: Does not bruise/bleed easily.   Psychiatric/Behavioral: The patient is not nervous/anxious.    All other systems reviewed and are negative.       Objective:       Physical Examination:    Vital Signs:  There were no vitals filed for this visit.    Body mass index is 49.78 kg/m².    This a well-developed, well nourished patient in no acute distress.  They are alert and oriented and cooperative to examination.     Examination of the bilateral knees, skin is dry and intact, no erythema or ecchymosis, no signs symptoms of infection.  Range of motion 0-120 degrees.  Stable anterior-posterior varus and valgus stress.  Homans signs negative, straight leg raise is negative.  Distally neurovascularly intact.  He does have prominent left greater than right lower extremity edema and swelling with peripheral vascular changes.      Pertinent New Results:     Narrative & Impression  EXAMINATION:  XR KNEE 3 VIEW BILATERAL     CLINICAL HISTORY:  Pain in unspecified knee     FINDINGS:  Three views of the bilateral knees show no fractures, dislocations or acute osseous abnormalities.  There is mild joint space narrowing of the medial femoral tibial joints bilaterally.  There are bilateral small suprapatellar joint effusions..     Impression:     Mild osteoarthritic changes of the medial femoral tibial joints bilaterally     Small bilateral suprapatella joint effusions     No acute osseous abnormality        Electronically signed by:Nona Villela  Date:                                            12/26/2024  Time:                                           10:23        Exam Ended: 12/26/24 10:19 CST Last Resulted: 12/26/24 10:23 CST          XRAY Report / Interpretation:     AP lateral sunrise views of the bilateral knees taken today in the office demonstrate advanced medial compartment collapse with near bone-on-bone deformity in other tricompartmental osteophyte and changes.        Assessment:       1. Primary osteoarthritis of left knee    2. Primary osteoarthritis of right knee      Plan:     Primary osteoarthritis of left knee  -     X-Ray Knee 1 or 2 View Bilateral  -     Large Joint  Aspiration/Injection: L knee    Primary osteoarthritis of right knee  -     Ambulatory referral/consult to Orthopedics  -     X-Ray Knee 1 or 2 View Bilateral  -     Large Joint Aspiration/Injection: R knee        Follow up in about 3 months (around 4/24/2025) for Re-check symptoms, Injection f/u, Tues/Thurs with Dr. Schroeder.    Osteoarthritis bilateral knees.  Patient is morbidly obese, BMI 50.  We would like to get down to about 40.  He is also got some peripheral vascular disease of the lower extremity swelling which could be risk factors for poor wound healing.  He has follow-ups in the coming months for the lower extremity swelling.  We injected both knees today for the arthritis lidocaine and triamcinolone anterior lateral approach sterile technique he tolerated well.  He will follow up with us in 3 months for repeat injections.  We will continue to follow along with his other specialists hopefully finger him healthy enough for surgery.    The patient and I had a thorough discussion today.  We discussed the working diagnosis as well as several other potential alternative diagnoses.  We discussed treatment options, both conservative and surgical.  Conservative treatment options would include things such as activity modifications, workplace modifications, a period of rest, oral versus topical over the counter and oral versus topical prescription anti-inflammatory medications, physical therapy / occupational therapy, splinting / bracing, immobilization, corticosteroid injections, and others.        SARANYA Coulter, PA-C        EMR Statement:  Please note that portions of this patient encounter record were imported from the patients electronic medical record and that others were dictated using voice recognition software. For these reasons grammatical errors, nonsensical language, and apparently contradictory statements may be present.  These should be disregarded or interpreted with respect to the context of  the document.

## 2025-03-11 ENCOUNTER — TELEPHONE (OUTPATIENT)
Dept: FAMILY MEDICINE | Facility: CLINIC | Age: 60
End: 2025-03-11
Payer: MEDICAID

## 2025-03-11 DIAGNOSIS — E53.8 B12 DEFICIENCY: ICD-10-CM

## 2025-03-11 DIAGNOSIS — Z12.5 SCREENING PSA (PROSTATE SPECIFIC ANTIGEN): ICD-10-CM

## 2025-03-11 DIAGNOSIS — E11.9 TYPE 2 DIABETES MELLITUS TREATED WITHOUT INSULIN: Primary | ICD-10-CM

## 2025-03-11 DIAGNOSIS — E78.2 HYPERLIPIDEMIA, MIXED: ICD-10-CM

## 2025-03-11 DIAGNOSIS — I10 ESSENTIAL HYPERTENSION: ICD-10-CM

## 2025-03-12 ENCOUNTER — PATIENT MESSAGE (OUTPATIENT)
Dept: FAMILY MEDICINE | Facility: CLINIC | Age: 60
End: 2025-03-12
Payer: MEDICAID

## 2025-03-17 ENCOUNTER — LAB VISIT (OUTPATIENT)
Dept: LAB | Facility: HOSPITAL | Age: 60
End: 2025-03-17
Attending: NURSE PRACTITIONER
Payer: MEDICAID

## 2025-03-17 ENCOUNTER — RESULTS FOLLOW-UP (OUTPATIENT)
Dept: FAMILY MEDICINE | Facility: CLINIC | Age: 60
End: 2025-03-17

## 2025-03-17 DIAGNOSIS — E53.8 B12 DEFICIENCY: ICD-10-CM

## 2025-03-17 DIAGNOSIS — E11.9 TYPE 2 DIABETES MELLITUS TREATED WITHOUT INSULIN: ICD-10-CM

## 2025-03-17 DIAGNOSIS — E78.2 HYPERLIPIDEMIA, MIXED: ICD-10-CM

## 2025-03-17 DIAGNOSIS — I10 ESSENTIAL HYPERTENSION: ICD-10-CM

## 2025-03-17 DIAGNOSIS — Z12.5 SCREENING PSA (PROSTATE SPECIFIC ANTIGEN): ICD-10-CM

## 2025-03-17 LAB
ALBUMIN SERPL BCP-MCNC: 4.1 G/DL (ref 3.5–5.2)
ALBUMIN/CREAT UR: 90 UG/MG (ref 0–30)
ALP SERPL-CCNC: 81 U/L (ref 55–135)
ALT SERPL W/O P-5'-P-CCNC: 21 U/L (ref 10–44)
ANION GAP SERPL CALC-SCNC: 13 MMOL/L (ref 8–16)
AST SERPL-CCNC: 18 U/L (ref 10–40)
BACTERIA #/AREA URNS HPF: ABNORMAL /HPF
BILIRUB SERPL-MCNC: 0.5 MG/DL (ref 0.1–1)
BILIRUB UR QL STRIP: NEGATIVE
BUN SERPL-MCNC: 12 MG/DL (ref 6–20)
CALCIUM SERPL-MCNC: 9.4 MG/DL (ref 8.7–10.5)
CHLORIDE SERPL-SCNC: 105 MMOL/L (ref 95–110)
CLARITY UR: CLEAR
CO2 SERPL-SCNC: 24 MMOL/L (ref 23–29)
COLOR UR: YELLOW
COMPLEXED PSA SERPL-MCNC: 2.22 NG/ML (ref 0–4)
CREAT SERPL-MCNC: 0.9 MG/DL (ref 0.5–1.4)
CREAT UR-MCNC: 367.9 MG/DL (ref 23–375)
EST. GFR  (NO RACE VARIABLE): >60 ML/MIN/1.73 M^2
ESTIMATED AVG GLUCOSE: 143 MG/DL (ref 68–131)
GLUCOSE SERPL-MCNC: 143 MG/DL (ref 70–110)
GLUCOSE UR QL STRIP: ABNORMAL
HBA1C MFR BLD: 6.6 % (ref 4.5–6.2)
HGB UR QL STRIP: NEGATIVE
HYALINE CASTS #/AREA URNS LPF: 10 /LPF
KETONES UR QL STRIP: ABNORMAL
LEUKOCYTE ESTERASE UR QL STRIP: NEGATIVE
MICROALBUMIN UR DL<=1MG/L-MCNC: 331.2 UG/ML
MICROSCOPIC COMMENT: ABNORMAL
NITRITE UR QL STRIP: NEGATIVE
PH UR STRIP: 6 [PH] (ref 5–8)
POTASSIUM SERPL-SCNC: 3.9 MMOL/L (ref 3.5–5.1)
PROT SERPL-MCNC: 6.5 G/DL (ref 6–8.4)
PROT UR QL STRIP: ABNORMAL
RBC #/AREA URNS HPF: 0 /HPF (ref 0–4)
SODIUM SERPL-SCNC: 142 MMOL/L (ref 136–145)
SP GR UR STRIP: 1.02 (ref 1–1.03)
SQUAMOUS #/AREA URNS HPF: 1 /HPF
URN SPEC COLLECT METH UR: ABNORMAL
UROBILINOGEN UR STRIP-ACNC: NEGATIVE EU/DL
VIT B12 SERPL-MCNC: 378 PG/ML (ref 210–950)
WBC #/AREA URNS HPF: 4 /HPF (ref 0–5)

## 2025-03-17 PROCEDURE — 82570 ASSAY OF URINE CREATININE: CPT | Performed by: NURSE PRACTITIONER

## 2025-03-17 PROCEDURE — 82607 VITAMIN B-12: CPT | Performed by: NURSE PRACTITIONER

## 2025-03-17 PROCEDURE — 81001 URINALYSIS AUTO W/SCOPE: CPT | Performed by: NURSE PRACTITIONER

## 2025-03-17 PROCEDURE — 84153 ASSAY OF PSA TOTAL: CPT | Performed by: NURSE PRACTITIONER

## 2025-03-17 PROCEDURE — 83036 HEMOGLOBIN GLYCOSYLATED A1C: CPT | Performed by: NURSE PRACTITIONER

## 2025-03-17 PROCEDURE — 80053 COMPREHEN METABOLIC PANEL: CPT | Performed by: NURSE PRACTITIONER

## 2025-03-18 NOTE — PROGRESS NOTES
SUBJECTIVE:      Patient ID: Be Shipman is a 59 y.o. male.    Chief Complaint: Diabetes    HPI  History of Present Illness    CHIEF COMPLAINT:  Be presents today for follow up on DM. He was started on metformin at his previous ov. Following with Dr Rust for cardiology and has an upcoming appt. Says he took sudafed this morning for sinus congestion and that is why his bp is elevated    WEIGHT MANAGEMENT:  He reports significant weight gain. His eating pattern consists of one meal per day, typically attempting breakfast and dinner before 6 PM (usually around 4-5 PM), skipping lunch. He incorporates fruits and vegetables into his diet but acknowledges this eating pattern is not optimal for weight management.    CURRENT MEDICATIONS:  He takes Atenolol, lisinopril and Lasix daily. He has been self-medicating with steroids for swelling, which he reports has reduced swelling and alleviated knee pain. Following with ortho for bilat osteoarthritis     MEDICAL HISTORY:  He quit smoking in his mid-twenties. He had an eye exam approximately two months ago and received new glasses.    LABS AND TESTS:  A1c was 6.6. Kidney and liver function tests were normal. B12 level was within normal range. Urinalysis was unremarkable.       Past Surgical History:   Procedure Laterality Date    CORONARY STENT PLACEMENT  08/09/2019    pt has card in [facility] Dr. [facility]    HAND SURGERY Left     22 y/o, laceration repair    HERNIA REPAIR      umbilical hernia    INCISION AND DRAINAGE FOOT Right 09/13/2021    Procedure: INCISION AND DRAINAGE, FOOT;  Surgeon: Juan Jose Julio DPM;  Location: [facility];  Service: Podiatry;  Laterality: Right;    left breast tumor removed (benign)       LEFT HEART CATHETERIZATION Left 08/09/2019    Procedure: Left heart cath;  Surgeon: Erik Jessica MD;  Location: [facility] CATH;  Service: Cardiology;  Laterality: Left;    LEG SURGERY Left 10/2022     Family History   Problem Relation Name  Age of Onset    Heart disease Mother          CAD    Hypertension Mother      Cancer Sister          breast    Heart disease Sister  50    Heart disease Brother          MI    Stroke Brother      Heart disease Brother          MI    Heart disease Brother          MI      Social History     Socioeconomic History    Marital status:    Tobacco Use    Smoking status: Former     Current packs/day: 0.00     Average packs/day: 2.0 packs/day for 25.0 years (50.0 ttl pk-yrs)     Types: Cigarettes     Start date:      Quit date:      Years since quittin.2    Smokeless tobacco: Former     Quit date: 2000    Tobacco comments:     quit 12 years ago   Substance and Sexual Activity    Alcohol use: Yes     Comment: every day 1/2 a fifth of whiskey per day    Drug use: No    Sexual activity: Yes     Social Drivers of Health     Financial Resource Strain: Patient Declined (2024)    Overall Financial Resource Strain (CARDIA)     Difficulty of Paying Living Expenses: Patient declined   Food Insecurity: Patient Declined (2024)    Hunger Vital Sign     Worried About Running Out of Food in the Last Year: Patient declined     Ran Out of Food in the Last Year: Patient declined   Transportation Needs: No Transportation Needs (10/15/2022)    PRAPARE - Transportation     Lack of Transportation (Medical): No     Lack of Transportation (Non-Medical): No   Physical Activity: Inactive (2024)    Exercise Vital Sign     Days of Exercise per Week: 0 days     Minutes of Exercise per Session: 0 min   Stress: Patient Declined (2024)    [employer] [place] [employer] - Occupational Stress Questionnaire     Feeling of Stress : Patient declined   Housing Stability: Unknown (2024)    Housing Stability Vital Sign     Unable to Pay for Housing in the Last Year: Patient declined     Current Medications[1]  Review of patient's allergies indicates:   Allergen Reactions    Tetanus vaccines and  toxoid Anaphylaxis, Hives and Rash      Past Medical History:   Diagnosis Date    Anticoagulant long-term use     Cellulitis of left leg 10/2022    Coronary artery disease     Diabetes mellitus     Hyperlipidemia     Hypertension     Morbid obesity      Past Surgical History:   Procedure Laterality Date    CORONARY STENT PLACEMENT  08/09/2019    pt has card in [facility] Dr. [facility]imm    HAND SURGERY Left     22 y/o, laceration repair    HERNIA REPAIR      umbilical hernia    INCISION AND DRAINAGE FOOT Right 09/13/2021    Procedure: INCISION AND DRAINAGE, FOOT;  Surgeon: Juan Jose Julio DPM;  Location: [facility] OR;  Service: Podiatry;  Laterality: Right;    left breast tumor removed (benign)       LEFT HEART CATHETERIZATION Left 08/09/2019    Procedure: Left heart cath;  Surgeon: Erik Jessica MD;  Location: [facility] CATH;  Service: Cardiology;  Laterality: Left;    LEG SURGERY Left 10/2022       Review of Systems   Constitutional:  Negative for appetite change, chills, diaphoresis and unexpected weight change.   HENT:  Negative for ear discharge, facial swelling, hearing loss, nosebleeds and trouble swallowing.    Eyes:  Negative for photophobia, pain and visual disturbance.   Respiratory:  Negative for apnea, choking, shortness of breath and wheezing.    Cardiovascular:  Negative for chest pain and palpitations.   Gastrointestinal:  Negative for abdominal pain, blood in stool and vomiting.   Endocrine: Negative for polyphagia.   Genitourinary:  Negative for difficulty urinating and hematuria.   Musculoskeletal:  Positive for arthralgias. Negative for gait problem and joint swelling.   Skin:  Negative for pallor.   Neurological:  Negative for dizziness, seizures, speech difficulty, weakness and headaches.   Hematological:  Does not bruise/bleed easily.   Psychiatric/Behavioral:  Negative for agitation, confusion, self-injury, sleep disturbance and suicidal ideas. The patient is not nervous/anxious.      "  OBJECTIVE:      Vitals:    03/19/25 0820 03/19/25 0846   BP: (!) (P) 190/100 (!) 150/98   Pulse: 72    SpO2: 96%    Weight: (!) 167.8 kg (370 lb)    Height: 5' 11" (1.803 m)      Physical Exam  Vitals and nursing note reviewed.   Constitutional:       General: He is not in acute distress.     Appearance: He is well-developed.   HENT:      Head: Normocephalic and atraumatic.      Nose: Nose normal.      Mouth/Throat:      Pharynx: Uvula midline.   Eyes:      General: Lids are normal.      Conjunctiva/sclera: Conjunctivae normal.      Pupils: Pupils are equal, round, and reactive to light.      Right eye: Pupil is round and reactive.      Left eye: Pupil is round and reactive.   Neck:      Thyroid: No thyromegaly.      Vascular: No carotid bruit.   Cardiovascular:      Rate and Rhythm: Normal rate and regular rhythm.      Pulses: Normal pulses.      Heart sounds: Normal heart sounds.   Pulmonary:      Effort: Pulmonary effort is normal.      Breath sounds: Normal breath sounds. No wheezing, rhonchi or rales.   Abdominal:      General: Bowel sounds are normal.      Palpations: Abdomen is soft. Abdomen is not rigid.      Tenderness: There is no abdominal tenderness.   Musculoskeletal:         General: Normal range of motion.      Cervical back: Normal range of motion and neck supple.      Right lower leg: Edema present.      Left lower leg: Edema present.   Lymphadenopathy:      Cervical: No cervical adenopathy.   Skin:     General: Skin is warm and dry.      Nails: There is no clubbing.   Neurological:      Mental Status: He is alert and oriented to person, place, and time.   Psychiatric:         Mood and Affect: Mood normal.         Speech: Speech normal.         Behavior: Behavior normal. Behavior is cooperative.         Thought Content: Thought content normal.         Judgment: Judgment normal.       Lab Visit on 03/17/2025   Component Date Value Ref Range Status    PSA, Screen 03/17/2025 2.22  0.00 - 4.00 ng/mL " Final    Comment: The testing method is a chemiluminescent immunoassay manufactured by   [employer] and performed on the [employer]oassay Systems. Values   obtained with different assay manufacturers for methods may be   different and   cannot be used interchangeably      Sodium 03/17/2025 142  136 - 145 mmol/L Final    Potassium 03/17/2025 3.9  3.5 - 5.1 mmol/L Final    Chloride 03/17/2025 105  95 - 110 mmol/L Final    CO2 03/17/2025 24  23 - 29 mmol/L Final    Glucose 03/17/2025 143 (H)  70 - 110 mg/dL Final    BUN 03/17/2025 12  6 - 20 mg/dL Final    Creatinine 03/17/2025 0.9  0.5 - 1.4 mg/dL Final    Calcium 03/17/2025 9.4  8.7 - 10.5 mg/dL Final    Total Protein 03/17/2025 6.5  6.0 - 8.4 g/dL Final    Albumin 03/17/2025 4.1  3.5 - 5.2 g/dL Final    Total Bilirubin 03/17/2025 0.5  0.1 - 1.0 mg/dL Final    Comment: For infants and newborns, interpretation of results should be based  on gestational age, weight and in agreement with clinical  observations.    Premature Infant recommended reference ranges:  Up to 24 hours.............<8.0 mg/dL  Up to 48 hours............<12.0 mg/dL  3-5 days..................<15.0 mg/dL  6-29 days.................<15.0 mg/dL      Alkaline Phosphatase 03/17/2025 81  55 - 135 U/L Final    AST 03/17/2025 18  10 - 40 U/L Final    ALT 03/17/2025 21  10 - 44 U/L Final    eGFR 03/17/2025 >60.0  >60 mL/min/1.73 m^2 Final    Anion Gap 03/17/2025 13  8 - 16 mmol/L Final    Specimen UA 03/17/2025 Urine, Clean Catch   Final    Color, UA 03/17/2025 Yellow  Yellow, Straw, Aleena Final    Appearance, UA 03/17/2025 Clear  Clear Final    pH, UA 03/17/2025 6.0  5.0 - 8.0 Final    Specific Gravity, UA 03/17/2025 1.025  1.005 - 1.030 Final    Protein, UA 03/17/2025 1+ (A)  Negative Final    Comment: Recommend a 24 hour urine protein or a urine   protein/creatinine ratio if globulin induced proteinuria is  clinically suspected.      Glucose, UA 03/17/2025 Trace (A)  Negative Final     Ketones, UA 03/17/2025 Trace (A)  Negative Final    Bilirubin (UA) 03/17/2025 Negative  Negative Final    Occult Blood UA 03/17/2025 Negative  Negative Final    Nitrite, UA 03/17/2025 Negative  Negative Final    Urobilinogen, UA 03/17/2025 Negative  Negative EU/dL Final    Leukocytes, UA 03/17/2025 Negative  Negative Final    Hemoglobin A1C 03/17/2025 6.6 (H)  4.5 - 6.2 % Final    Comment: According to ADA guidelines, hemoglobin A1C <7.0% represents  optimal control in non-pregnant diabetic patients.  Different  metrics may apply to specific populations.   Standards of Medical Care in Diabetes - 2016.    For the purpose of screening for the presence of diabetes:  <5.7%     Consistent with the absence of diabetes  5.7-6.4%  Consistent with increasing risk for diabetes   (prediabetes)  >or=6.5%  Consistent with diabetes    Currently no consensus exists for use of hemoglobin A1C  for diagnosis of diabetes for children.      Estimated Avg Glucose 03/17/2025 143 (H)  68 - 131 mg/dL Final    Microalbumin, Urine 03/17/2025 331.2 (H)  <19.9 ug/mL Final    Creatinine, Urine 03/17/2025 367.9  23.0 - 375.0 mg/dL Final    Comment: The random urine reference ranges provided were established   for 24 hour urine collections.  No reference ranges exist for  random urine specimens.  Correlate clinically.      Microalb/Creat Ratio 03/17/2025 90.0 (H)  0.0 - 30.0 ug/mg Final    Vitamin B-12 03/17/2025 378  210 - 950 pg/mL Final    RBC, UA 03/17/2025 0  0 - 4 /hpf Final    WBC, UA 03/17/2025 4  0 - 5 /hpf Final    Bacteria 03/17/2025 None  None-Occ /hpf Final    Squam Epithel, UA 03/17/2025 1  /hpf Final    Hyaline Casts, UA 03/17/2025 10 (A)  0-1/lpf /lpf Final    Microscopic Comment 03/17/2025 SEE COMMENT   Final    Comment: Other formed elements not mentioned in the report are not   present in the microscopic examination.         Assessment:       1. Type 2 diabetes mellitus treated without insulin    2. Hyperlipidemia, mixed    3.  Essential hypertension    4. B12 deficiency    5. Morbid obesity        Plan:       Type 2 diabetes mellitus treated without insulin  -    start  tirzepatide (MOUNJARO) 2.5 mg/0.5 mL PnIj; Inject 2.5 mg into the skin every 7 days.  Dispense: 4 Pen; Refill: 0  -     Ambulatory referral/consult to Podiatry; Future; Expected date: 03/19/2025  -     metFORMIN (GLUCOPHAGE-XR) 500 MG ER 24hr tablet; Take 1 tablet (500 mg total) by mouth daily with breakfast.  Dispense: 90 tablet; Refill: 1    Hyperlipidemia, mixed  Stable on current meds    Essential hypertension  -     start amLODIPine (NORVASC) 5 MG tablet; Take 1 tablet (5 mg total) by mouth once daily.  Dispense: 30 tablet; Refill: 1                 Cont lisinopril, atenolol and lasix    B12 deficiency  Cont otc supplement    Morbid obesity      Assessment & Plan    HYPERTENSION:  - Evaluated blood pressure control and determined the need for additional antihypertensive medication.  - Initiated Norvasc (amlodipine) for blood pressure management.  - Continued current antihypertensive medications, including lisinopril and Atenolol.  - Ordered blood pressure check in 4 weeks.  - Scheduled follow-up nurse visit in 4 weeks for blood pressure monitoring.  - Discussed the relationship between weight loss and blood pressure control  Encouraged f/u with cardiology    TYPE 2 DIABETES:  - Reviewed recent lab results: HbA1c 6.6%.  - Educated the patient about the effects of steroids on glucose levels.  - Continued Metformin for diabetes management.  - Discussed injectable GLP1 options for improved glycemic control.  - Initiated Mounjaro injections for diabetes management in addition to Metformin.    EDEMA:  - Noted use of steroids from an external source.  - Educated the patient about the effects of steroids on fluid retention.  - Continued Lasix (furosemide) for fluid retention; instructed to take daily.  - Continued potassium supplementation.  - Emphasized the importance of  daily Lasix administration for edema management  Encouraged use of compression stockings    HYPERLIPIDEMIA:  - Continued current cholesterol medication.    KNEE PAIN:  - Noted pain relief in knees after steroid use.  - Cautioned the patient about potential side effects of steroid use.  - Acknowledged previous consultation with an orthopedic specialist for knee issues.      WEIGHT MANAGEMENT:  - Assessed weight management progress; previous formula ineffective.  - Educated on the importance of small, frequent meals and avoiding late-night eating.  - Be to continue drinking adequate water.  - Recommend decreasing food intake and modifying types of food consumed.  - Initiated Mounjaro (tirzepatide) for weight loss: once weekly injection on the same day each week.  - Advised about potential side effects, including nausea with heavy meals and constipation.    Recommend reducing alcohol intake due to potential health risk and to improve weight loss    FOLLOW-UP:  -- Referred to podiatrist for foot exam.  - Follow up in 3 months for weight check and medication adjustment.         Follow up in about 3 months (around 6/19/2025) for DM .  This note was generated with the assistance of ambient listening technology. Verbal consent was obtained by the patient and accompanying visitor(s) for the recording of patient appointment to facilitate this note. I attest to having reviewed and edited the generated note for accuracy, though some syntax or spelling errors may persist. Please contact the author of this note for any clarification.        3/19/2025 SHELBI Maciel, ORESTESP             [1]   Current Outpatient Medications   Medication Sig Dispense Refill    atenoloL (TENORMIN) 50 MG tablet Take 50 mg by mouth every evening.      atenoloL-chlorthalidone (TENORETIC) 100-25 mg per tablet Take 1 tablet by mouth once daily.      atorvastatin (LIPITOR) 40 MG tablet TAKE 1 TABLET BY MOUTH EVERY DAY 90 tablet 0    esomeprazole  (NEXIUM) 40 MG capsule Take 40 mg by mouth before breakfast.      furosemide (LASIX) 20 MG tablet Take 1 tablet (20 mg total) by mouth once daily. 90 tablet 0    lisinopriL (PRINIVIL,ZESTRIL) 40 MG tablet Take 40 mg by mouth.      nitroGLYCERIN (NITROSTAT) 0.4 MG SL tablet Place 1 tablet (0.4 mg total) under the tongue every 5 (five) minutes as needed for Chest pain. 25 tablet 2    potassium chloride SA (K-DUR,KLOR-CON M) 10 MEQ tablet TAKE 1 TABLET(10 MEQ) BY MOUTH DAILY 90 tablet 0    pregabalin (LYRICA) 100 MG capsule Take 100 mg by mouth 2 (two) times daily.      amLODIPine (NORVASC) 5 MG tablet Take 1 tablet (5 mg total) by mouth once daily. 30 tablet 1    metFORMIN (GLUCOPHAGE-XR) 500 MG ER 24hr tablet Take 1 tablet (500 mg total) by mouth daily with breakfast. 90 tablet 1    tirzepatide (MOUNJARO) 2.5 mg/0.5 mL PnIj Inject 2.5 mg into the skin every 7 days. 4 Pen 0     No current facility-administered medications for this visit.

## 2025-03-19 ENCOUNTER — OFFICE VISIT (OUTPATIENT)
Dept: FAMILY MEDICINE | Facility: CLINIC | Age: 60
End: 2025-03-19
Payer: MEDICAID

## 2025-03-19 VITALS
SYSTOLIC BLOOD PRESSURE: 150 MMHG | DIASTOLIC BLOOD PRESSURE: 98 MMHG | WEIGHT: 315 LBS | OXYGEN SATURATION: 96 % | HEART RATE: 72 BPM | HEIGHT: 71 IN | BODY MASS INDEX: 44.1 KG/M2

## 2025-03-19 DIAGNOSIS — E53.8 B12 DEFICIENCY: ICD-10-CM

## 2025-03-19 DIAGNOSIS — E66.01 MORBID OBESITY: ICD-10-CM

## 2025-03-19 DIAGNOSIS — I10 ESSENTIAL HYPERTENSION: ICD-10-CM

## 2025-03-19 DIAGNOSIS — E11.9 TYPE 2 DIABETES MELLITUS TREATED WITHOUT INSULIN: Primary | ICD-10-CM

## 2025-03-19 DIAGNOSIS — E78.2 HYPERLIPIDEMIA, MIXED: ICD-10-CM

## 2025-03-19 PROCEDURE — 3008F BODY MASS INDEX DOCD: CPT | Mod: CPTII,S$GLB,, | Performed by: NURSE PRACTITIONER

## 2025-03-19 PROCEDURE — 3066F NEPHROPATHY DOC TX: CPT | Mod: CPTII,S$GLB,, | Performed by: NURSE PRACTITIONER

## 2025-03-19 PROCEDURE — 3044F HG A1C LEVEL LT 7.0%: CPT | Mod: CPTII,S$GLB,, | Performed by: NURSE PRACTITIONER

## 2025-03-19 PROCEDURE — 4010F ACE/ARB THERAPY RXD/TAKEN: CPT | Mod: CPTII,S$GLB,, | Performed by: NURSE PRACTITIONER

## 2025-03-19 PROCEDURE — 1160F RVW MEDS BY RX/DR IN RCRD: CPT | Mod: CPTII,S$GLB,, | Performed by: NURSE PRACTITIONER

## 2025-03-19 PROCEDURE — 3060F POS MICROALBUMINURIA REV: CPT | Mod: CPTII,S$GLB,, | Performed by: NURSE PRACTITIONER

## 2025-03-19 PROCEDURE — 99214 OFFICE O/P EST MOD 30 MIN: CPT | Mod: S$GLB,,, | Performed by: NURSE PRACTITIONER

## 2025-03-19 PROCEDURE — 3080F DIAST BP >= 90 MM HG: CPT | Mod: CPTII,S$GLB,, | Performed by: NURSE PRACTITIONER

## 2025-03-19 PROCEDURE — 1159F MED LIST DOCD IN RCRD: CPT | Mod: CPTII,S$GLB,, | Performed by: NURSE PRACTITIONER

## 2025-03-19 PROCEDURE — 3077F SYST BP >= 140 MM HG: CPT | Mod: CPTII,S$GLB,, | Performed by: NURSE PRACTITIONER

## 2025-03-19 RX ORDER — METFORMIN HYDROCHLORIDE 500 MG/1
500 TABLET, EXTENDED RELEASE ORAL
Qty: 90 TABLET | Refills: 1 | Status: SHIPPED | OUTPATIENT
Start: 2025-03-19

## 2025-03-19 RX ORDER — AMLODIPINE BESYLATE 5 MG/1
5 TABLET ORAL DAILY
Qty: 30 TABLET | Refills: 1 | Status: SHIPPED | OUTPATIENT
Start: 2025-03-19

## 2025-03-19 RX ORDER — TIRZEPATIDE 2.5 MG/.5ML
2.5 INJECTION, SOLUTION SUBCUTANEOUS
Qty: 4 PEN | Refills: 0 | Status: SHIPPED | OUTPATIENT
Start: 2025-03-19

## 2025-03-24 ENCOUNTER — TELEPHONE (OUTPATIENT)
Dept: FAMILY MEDICINE | Facility: CLINIC | Age: 60
End: 2025-03-24
Payer: MEDICAID

## 2025-03-24 NOTE — TELEPHONE ENCOUNTER
----- Message from Caroline sent at 3/24/2025  9:22 AM CDT -----  Contact: waldo Gallagher from pa department with [facility] calling about patient tanner that has been denied. Information will be faxed. Case id# [id number]

## 2025-03-24 NOTE — TELEPHONE ENCOUNTER
CALL WILL NOT GO THROUGH  GOT THIS FOLLOWING DENIAL LETTER  The request for coverage for MOUNJARO INJ 2.5/0.5, use as directed (4 per 28 days), is denied. This  decision is based on health plan criteria for MOUNJARO INJ 2.5/0.5. This medicine is covered only if:  One of the following:  (1) Your doctor submits medical records (for example, chart notes) confirming diagnosis of type 2  diabetes mellitus as evidenced by one of the following laboratory values:  (A) A1C greater than or equal to 6.5%.  (B) Fasting plasma glucose greater than or equal to 126mg/dL.  (C) 2-hour plasma glucose greater than or equal to 200mg/dL during oral glucose tolerance test.  (D) Random plasma glucose greater than or equal to 200mg/dL with classic symptoms of  hyperglycemia or hyperglycemic crisis.  (2) If you require ongoing treatment for type 2 diabetes mellitus (that is, diagnosed greater than 2 years  ago), your doctor submits medical records (for example, chart notes) confirming diagnosis of type 2  diabetes mellitus.  The information provided does not show that you meet the criteria listed above.  The reason(s) [employer] did not approve this medication can be found above. This denial is based on  our MOUNJARO INJ 2.5/0.5 drug coverage policy, in addition to any supplementary information you or  your prescriber may have submitted.  LINCOLN-[id number]  Page 2 of 8  **

## 2025-03-26 DIAGNOSIS — E87.6 HYPOKALEMIA: ICD-10-CM

## 2025-03-27 RX ORDER — POTASSIUM CHLORIDE 750 MG/1
TABLET, EXTENDED RELEASE ORAL
Qty: 90 TABLET | Refills: 0 | Status: SHIPPED | OUTPATIENT
Start: 2025-03-27

## 2025-06-12 ENCOUNTER — PATIENT MESSAGE (OUTPATIENT)
Dept: FAMILY MEDICINE | Facility: CLINIC | Age: 60
End: 2025-06-12
Payer: COMMERCIAL

## 2025-06-12 ENCOUNTER — TELEPHONE (OUTPATIENT)
Dept: FAMILY MEDICINE | Facility: CLINIC | Age: 60
End: 2025-06-12

## 2025-06-12 DIAGNOSIS — E11.9 TYPE 2 DIABETES MELLITUS TREATED WITHOUT INSULIN: Primary | ICD-10-CM

## 2025-06-12 DIAGNOSIS — E78.2 HYPERLIPIDEMIA, MIXED: ICD-10-CM

## 2025-07-07 DIAGNOSIS — E78.2 HYPERLIPIDEMIA, MIXED: ICD-10-CM

## 2025-07-07 RX ORDER — ATORVASTATIN CALCIUM 40 MG/1
40 TABLET, FILM COATED ORAL DAILY
Qty: 90 TABLET | Refills: 0 | Status: SHIPPED | OUTPATIENT
Start: 2025-07-07

## 2025-07-07 NOTE — TELEPHONE ENCOUNTER
----- Message from Caroline sent at 7/7/2025 10:29 AM CDT -----  Patient is trying to receive an check up appointment. Werner has no appointment available on my side.   [phone]

## 2025-07-07 NOTE — TELEPHONE ENCOUNTER
----- Message from Bri sent at 7/7/2025 10:43 AM CDT -----  Pt needs refill on atorvastatin   [facility]   [phone]

## 2025-07-25 ENCOUNTER — TELEPHONE (OUTPATIENT)
Dept: FAMILY MEDICINE | Facility: CLINIC | Age: 60
End: 2025-07-25
Payer: MEDICAID

## 2025-07-25 ENCOUNTER — OFFICE VISIT (OUTPATIENT)
Dept: FAMILY MEDICINE | Facility: CLINIC | Age: 60
End: 2025-07-25
Payer: MEDICAID

## 2025-07-25 VITALS
DIASTOLIC BLOOD PRESSURE: 92 MMHG | HEIGHT: 71 IN | TEMPERATURE: 98 F | WEIGHT: 315 LBS | OXYGEN SATURATION: 97 % | SYSTOLIC BLOOD PRESSURE: 140 MMHG | HEART RATE: 85 BPM | BODY MASS INDEX: 44.1 KG/M2

## 2025-07-25 DIAGNOSIS — I25.10 CAD IN NATIVE ARTERY: ICD-10-CM

## 2025-07-25 DIAGNOSIS — L03.119 CELLULITIS OF LOWER LEG: ICD-10-CM

## 2025-07-25 DIAGNOSIS — E87.6 HYPOKALEMIA: ICD-10-CM

## 2025-07-25 DIAGNOSIS — E11.9 TYPE 2 DIABETES MELLITUS TREATED WITHOUT INSULIN: Primary | ICD-10-CM

## 2025-07-25 DIAGNOSIS — I10 ESSENTIAL HYPERTENSION: ICD-10-CM

## 2025-07-25 RX ORDER — DOXYCYCLINE 100 MG/1
100 CAPSULE ORAL 2 TIMES DAILY
Qty: 20 CAPSULE | Refills: 0 | Status: SHIPPED | OUTPATIENT
Start: 2025-07-25

## 2025-07-25 RX ORDER — AMLODIPINE BESYLATE 5 MG/1
5 TABLET ORAL DAILY
Qty: 90 TABLET | Refills: 0 | Status: SHIPPED | OUTPATIENT
Start: 2025-07-25

## 2025-07-25 RX ORDER — LISINOPRIL 40 MG/1
40 TABLET ORAL DAILY
Qty: 90 TABLET | Refills: 0 | Status: SHIPPED | OUTPATIENT
Start: 2025-07-25

## 2025-07-25 RX ORDER — POTASSIUM CHLORIDE 750 MG/1
10 TABLET, EXTENDED RELEASE ORAL DAILY
Qty: 90 TABLET | Refills: 0 | Status: SHIPPED | OUTPATIENT
Start: 2025-07-25

## 2025-07-25 RX ORDER — FUROSEMIDE 20 MG/1
20 TABLET ORAL DAILY
Qty: 90 TABLET | Refills: 0 | Status: SHIPPED | OUTPATIENT
Start: 2025-07-25

## 2025-07-25 RX ORDER — TIRZEPATIDE 2.5 MG/.5ML
2.5 INJECTION, SOLUTION SUBCUTANEOUS
Qty: 4 PEN | Refills: 0 | Status: SHIPPED | OUTPATIENT
Start: 2025-07-25

## 2025-07-25 NOTE — TELEPHONE ENCOUNTER
Hey! I'm going to call patient and inform him but its not letting you schedule his appointment because he has medicaid and our medicaid panel is full.

## 2025-07-25 NOTE — TELEPHONE ENCOUNTER
----- Message from Emma sent at 7/25/2025 11:01 AM CDT -----  W[facility] Patel is referring this patient to [facility]. The system is not letting me schedule a ECA.   Please call [phone] for a three month appointment  htn.

## 2025-07-25 NOTE — PROGRESS NOTES
SUBJECTIVE:      Patient ID: Be Shipman is a 59 y.o. male.    Chief Complaint: Follow-up (No bottles// Pt is here for a follow up and medication refills . Pt would like to discuss weight loss medication. Would like to discuss steroids for swelling. // KMS)    Mr Shipman is here today to f/u on hyperlipidemia, htn, dm. He is following with cardiology Dr Rust. Follows with [facility] for neuropathy, currently on lyrica . He is out of a few of his medications and asking for a refill. He did not start mounjaro due to insurance but says he has a new insurance and asking for it to be resent. He has chronic bilat lower leg swelling. Has not been using compression stocking. He is out of his fluid pill. He is not taking the metformin     Hypertension  This is a chronic problem. The current episode started more than 1 year ago. The problem is unchanged. The problem is controlled. Associated symptoms include peripheral edema. Pertinent negatives include no anxiety, chest pain, palpitations or shortness of breath. Risk factors for coronary artery disease include male gender, obesity and dyslipidemia. Past treatments include calcium channel blockers, beta blockers, diuretics and angiotensin blockers. The current treatment provides moderate improvement.   Hyperlipidemia  This is a chronic problem. The current episode started more than 1 year ago. The problem is controlled. Recent lipid tests were reviewed and are normal. Pertinent negatives include no chest pain or shortness of breath. Current antihyperlipidemic treatment includes statins. The current treatment provides mild improvement of lipids. Compliance problems include adherence to exercise and adherence to diet.    Diabetes  He presents for his follow-up diabetic visit. He has type 2 diabetes mellitus. His disease course has been stable. There are no hypoglycemic associated symptoms. Pertinent negatives for hypoglycemia include no confusion, dizziness,  nervousness/anxiousness, pallor, seizures or speech difficulty. Associated symptoms include fatigue. Pertinent negatives for diabetes include no chest pain, no polyphagia and no weakness. There are no hypoglycemic complications. Symptoms are stable. Diabetic complications include heart disease and peripheral neuropathy. Risk factors for coronary artery disease include diabetes mellitus, dyslipidemia, male sex, hypertension, obesity and tobacco exposure. Current diabetic treatment includes oral agent (monotherapy). He is compliant with treatment some of the time. When asked about meal planning, he reported none. An ACE inhibitor/angiotensin II receptor blocker is being taken. He does not see a podiatrist.Eye exam is not current.       Past Surgical History:   Procedure Laterality Date    CORONARY STENT PLACEMENT  08/09/2019    pt has card in [facility]    HAND SURGERY Left     20 y/o, laceration repair    HERNIA REPAIR      umbilical hernia    INCISION AND DRAINAGE FOOT Right 09/13/2021    Procedure: INCISION AND DRAINAGE, FOOT;  Surgeon: Juan Jose Julio DPM;  Location: [facility] OR;  Service: Podiatry;  Laterality: Right;    left breast tumor removed (benign)       LEFT HEART CATHETERIZATION Left 08/09/2019    Procedure: Left heart cath;  Surgeon: Erik Jessica MD;  Location: [facility] CATH;  Service: Cardiology;  Laterality: Left;    LEG SURGERY Left 10/2022    VASECTOMY  1998     Family History   Problem Relation Name Age of Onset    Heart disease Mother ilia         CAD    Hypertension Mother ilia     Cancer Sister william         breast    Heart disease Sister william 50    Heart disease Brother nick         MI    Stroke Brother nick     Heart disease Brother ko         MI    Heart disease Brother mayra         MI      Social History     Socioeconomic History    Marital status:    Tobacco Use    Smoking status: Former     Current packs/day: 0.00     Average packs/day: 2.0  packs/day for 25.0 years (50.0 ttl pk-yrs)     Types: Cigarettes     Start date:      Quit date:      Years since quittin.5    Smokeless tobacco: Former     Quit date: 2000    Tobacco comments:     quit 12 years ago   Substance and Sexual Activity    Alcohol use: Yes     Comment: every day 1/2 a fifth of whiskey per day    Drug use: No    Sexual activity: Not Currently     Partners: Female     Social Drivers of Health     Financial Resource Strain: Patient Declined (2024)    Overall Financial Resource Strain (CARDIA)     Difficulty of Paying Living Expenses: Patient declined   Food Insecurity: Patient Declined (2024)    Hunger Vital Sign     Worried About Running Out of Food in the Last Year: Patient declined     Ran Out of Food in the Last Year: Patient declined   Transportation Needs: No Transportation Needs (10/15/2022)    PRAPARE - Transportation     Lack of Transportation (Medical): No     Lack of Transportation (Non-Medical): No   Physical Activity: Inactive (2024)    Exercise Vital Sign     Days of Exercise per Week: 0 days     Minutes of Exercise per Session: 0 min   Stress: Patient Declined (2024)    [employer] [place] [employer] - Occupational Stress Questionnaire     Feeling of Stress : Patient declined   Housing Stability: Unknown (2024)    Housing Stability Vital Sign     Unable to Pay for Housing in the Last Year: Patient declined     Current Medications[1]  Review of patient's allergies indicates:   Allergen Reactions    Tetanus vaccines and toxoid Anaphylaxis, Hives and Rash      Past Medical History:   Diagnosis Date    Anticoagulant long-term use     Cellulitis of left leg 10/2022    Coronary artery disease     Diabetes mellitus     Hyperlipidemia     Hypertension     Morbid obesity      Past Surgical History:   Procedure Laterality Date    CORONARY STENT PLACEMENT  2019    pt has card [facility]    HAND SURGERY  "Left     20 y/o, laceration repair    HERNIA REPAIR      umbilical hernia    INCISION AND DRAINAGE FOOT Right 09/13/2021    Procedure: INCISION AND DRAINAGE, FOOT;  Surgeon: Juan Jose Julio DPM;  Location: [facility] OR;  Service: Podiatry;  Laterality: Right;    left breast tumor removed (benign)       LEFT HEART CATHETERIZATION Left 08/09/2019    Procedure: Left heart cath;  Surgeon: Erik Jessica MD;  Location: [facility] CATH;  Service: Cardiology;  Laterality: Left;    LEG SURGERY Left 10/2022    VASECTOMY  1998       Review of Systems   Constitutional:  Positive for fatigue. Negative for appetite change and unexpected weight change.   HENT:  Negative for ear discharge, facial swelling, hearing loss, nosebleeds and trouble swallowing.    Eyes:  Negative for photophobia, pain and visual disturbance.   Respiratory:  Negative for apnea, choking, shortness of breath and wheezing.    Cardiovascular:  Negative for chest pain and palpitations.   Gastrointestinal:  Negative for abdominal pain and blood in stool.   Endocrine: Negative for polyphagia.   Genitourinary:  Negative for difficulty urinating and hematuria.   Musculoskeletal:  Positive for arthralgias. Negative for gait problem.   Skin:  Negative for pallor.   Neurological:  Negative for dizziness, seizures, speech difficulty and weakness.   Hematological:  Does not bruise/bleed easily.   Psychiatric/Behavioral:  Negative for agitation, confusion, dysphoric mood, self-injury, sleep disturbance and suicidal ideas. The patient is not nervous/anxious.       OBJECTIVE:      Vitals:    07/25/25 1012 07/25/25 1042   BP:  (!) 140/92   Pulse: 85    Temp:  97.6 °F (36.4 °C)   SpO2: 97%    Weight: (!) 172.9 kg (381 lb 3.2 oz)    Height: 5' 11" (1.803 m)      Physical Exam  Vitals and nursing note reviewed.   Constitutional:       General: He is not in acute distress.     Appearance: He is well-developed.   HENT:      Head: Normocephalic and atraumatic.      Nose: Nose normal. "      Mouth/Throat:      Pharynx: Uvula midline.   Eyes:      General: Lids are normal.      Conjunctiva/sclera: Conjunctivae normal.      Pupils: Pupils are equal, round, and reactive to light.      Right eye: Pupil is round and reactive.      Left eye: Pupil is round and reactive.   Neck:      Thyroid: No thyromegaly.      Vascular: No carotid bruit.   Cardiovascular:      Rate and Rhythm: Normal rate and regular rhythm.      Pulses: Normal pulses.      Heart sounds: Normal heart sounds.   Pulmonary:      Effort: Pulmonary effort is normal.      Breath sounds: No wheezing, rhonchi or rales.   Abdominal:      General: Bowel sounds are normal.      Palpations: Abdomen is soft. Abdomen is not rigid.      Tenderness: There is no abdominal tenderness.   Musculoskeletal:         General: Normal range of motion.      Cervical back: Normal range of motion and neck supple.      Right lower leg: Edema present.      Left lower leg: Edema present.      Comments: Using cane for mobility    Lymphadenopathy:      Cervical: No cervical adenopathy.   Skin:     General: Skin is warm and dry.      Nails: There is no clubbing.      Comments: Left lower leg with erythema, swelling and pain to palpation    Neurological:      Mental Status: He is alert and oriented to person, place, and time.   Psychiatric:         Mood and Affect: Mood normal.         Speech: Speech normal.         Behavior: Behavior is cooperative.          No visits with results within 3 Month(s) from this visit.   Latest known visit with results is:   Lab Visit on 03/17/2025   Component Date Value Ref Range Status    PSA, Screen 03/17/2025 2.22  0.00 - 4.00 ng/mL Final    Comment: The testing method is a chemiluminescent immunoassay manufactured by   [employer] [facility] [employer] and performed on the [employer] Immunoassay Systems. Values   obtained with different assay manufacturers for methods may be   different and   cannot be used interchangeably      Sodium 03/17/2025 142   136 - 145 mmol/L Final    Potassium 03/17/2025 3.9  3.5 - 5.1 mmol/L Final    Chloride 03/17/2025 105  95 - 110 mmol/L Final    CO2 03/17/2025 24  23 - 29 mmol/L Final    Glucose 03/17/2025 143 (H)  70 - 110 mg/dL Final    BUN 03/17/2025 12  6 - 20 mg/dL Final    Creatinine 03/17/2025 0.9  0.5 - 1.4 mg/dL Final    Calcium 03/17/2025 9.4  8.7 - 10.5 mg/dL Final    Total Protein 03/17/2025 6.5  6.0 - 8.4 g/dL Final    Albumin 03/17/2025 4.1  3.5 - 5.2 g/dL Final    Total Bilirubin 03/17/2025 0.5  0.1 - 1.0 mg/dL Final    Comment: For infants and newborns, interpretation of results should be based  on gestational age, weight and in agreement with clinical  observations.    Premature Infant recommended reference ranges:  Up to 24 hours.............<8.0 mg/dL  Up to 48 hours............<12.0 mg/dL  3-5 days..................<15.0 mg/dL  6-29 days.................<15.0 mg/dL      Alkaline Phosphatase 03/17/2025 81  55 - 135 U/L Final    AST 03/17/2025 18  10 - 40 U/L Final    ALT 03/17/2025 21  10 - 44 U/L Final    eGFR 03/17/2025 >60.0  >60 mL/min/1.73 m^2 Final    Anion Gap 03/17/2025 13  8 - 16 mmol/L Final    Specimen UA 03/17/2025 Urine, Clean Catch   Final    Color, UA 03/17/2025 Yellow  Yellow, Straw, Aleena Final    Appearance, UA 03/17/2025 Clear  Clear Final    pH, UA 03/17/2025 6.0  5.0 - 8.0 Final    Specific Gravity, UA 03/17/2025 1.025  1.005 - 1.030 Final    Protein, UA 03/17/2025 1+ (A)  Negative Final    Comment: Recommend a 24 hour urine protein or a urine   protein/creatinine ratio if globulin induced proteinuria is  clinically suspected.      Glucose, UA 03/17/2025 Trace (A)  Negative Final    Ketones, UA 03/17/2025 Trace (A)  Negative Final    Bilirubin (UA) 03/17/2025 Negative  Negative Final    Occult Blood UA 03/17/2025 Negative  Negative Final    Nitrite, UA 03/17/2025 Negative  Negative Final    Urobilinogen, UA 03/17/2025 Negative  Negative EU/dL Final    Leukocytes, UA 03/17/2025 Negative   Negative Final    Hemoglobin A1C 03/17/2025 6.6 (H)  4.5 - 6.2 % Final    Comment: According to ADA guidelines, hemoglobin A1C <7.0% represents  optimal control in non-pregnant diabetic patients.  Different  metrics may apply to specific populations.   Standards of Medical Care in Diabetes - 2016.    For the purpose of screening for the presence of diabetes:  <5.7%     Consistent with the absence of diabetes  5.7-6.4%  Consistent with increasing risk for diabetes   (prediabetes)  >or=6.5%  Consistent with diabetes    Currently no consensus exists for use of hemoglobin A1C  for diagnosis of diabetes for children.      Estimated Avg Glucose 03/17/2025 143 (H)  68 - 131 mg/dL Final    Microalbumin, Urine 03/17/2025 331.2 (H)  <19.9 ug/mL Final    Creatinine, Urine 03/17/2025 367.9  23.0 - 375.0 mg/dL Final    Comment: The random urine reference ranges provided were established   for 24 hour urine collections.  No reference ranges exist for  random urine specimens.  Correlate clinically.      Microalb/Creat Ratio 03/17/2025 90.0 (H)  0.0 - 30.0 ug/mg Final    Vitamin B-12 03/17/2025 378  210 - 950 pg/mL Final    RBC, UA 03/17/2025 0  0 - 4 /hpf Final    WBC, UA 03/17/2025 4  0 - 5 /hpf Final    Bacteria 03/17/2025 None  None-Occ /hpf Final    Squam Epithel, UA 03/17/2025 1  /hpf Final    Hyaline Casts, UA 03/17/2025 10 (A)  0-1/lpf /lpf Final    Microscopic Comment 03/17/2025 SEE COMMENT   Final    Comment: Other formed elements not mentioned in the report are not   present in the microscopic examination.      ]  Assessment:       1. Type 2 diabetes mellitus treated without insulin    2. Essential hypertension    3. Cellulitis of lower leg    4. Hypokalemia    5. CAD in native artery        Plan:       Type 2 diabetes mellitus treated without insulin  -     tirzepatide (MOUNJARO) 2.5 mg/0.5 mL PnIj; Inject 2.5 mg into the skin every 7 days.  Dispense: 4 Pen; Refill: 0  Advised to restart metformin  Resent   mounjaro  Encouraged diet and exercise    Essential hypertension  -     lisinopriL (PRINIVIL,ZESTRIL) 40 MG tablet; Take 1 tablet (40 mg total) by mouth once daily.  Dispense: 90 tablet; Refill: 0  -     amLODIPine (NORVASC) 5 MG tablet; Take 1 tablet (5 mg total) by mouth once daily.  Dispense: 90 tablet; Refill: 0  -     furosemide (LASIX) 20 MG tablet; Take 1 tablet (20 mg total) by mouth once daily.  Dispense: 90 tablet; Refill: 0  Refilled medication. Pt will  from pharmacy today. Advised to f/u in a few weeks for bp recheck on nurse schedule    Cellulitis of lower leg  -  start  doxycycline (VIBRAMYCIN) 100 MG Cap; Take 1 capsule (100 mg total) by mouth 2 (two) times daily.  Dispense: 20 capsule; Refill: 0  F/u if not improved    Hypokalemia  -     potassium chloride SA (K-DUR,KLOR-CON M) 10 MEQ tablet; Take 1 tablet (10 mEq total) by mouth once daily.  Dispense: 90 tablet; Refill: 0    CAD in native artery  Cont f/u with cardiology        Follow up in about 3 months (around 10/25/2025) for htn .      7/25/2025 Werner Patel, SHELBI, FNP             [1]   Current Outpatient Medications   Medication Sig Dispense Refill    atenoloL (TENORMIN) 50 MG tablet Take 50 mg by mouth every evening.      atenoloL-chlorthalidone (TENORETIC) 100-25 mg per tablet Take 1 tablet by mouth once daily.      atorvastatin (LIPITOR) 40 MG tablet Take 1 tablet (40 mg total) by mouth once daily. 90 tablet 0    esomeprazole (NEXIUM) 40 MG capsule Take 40 mg by mouth before breakfast.      pregabalin (LYRICA) 100 MG capsule Take 100 mg by mouth 2 (two) times daily.      amLODIPine (NORVASC) 5 MG tablet Take 1 tablet (5 mg total) by mouth once daily. 90 tablet 0    doxycycline (VIBRAMYCIN) 100 MG Cap Take 1 capsule (100 mg total) by mouth 2 (two) times daily. 20 capsule 0    furosemide (LASIX) 20 MG tablet Take 1 tablet (20 mg total) by mouth once daily. 90 tablet 0    lisinopriL (PRINIVIL,ZESTRIL) 40 MG tablet Take 1 tablet (40 mg  total) by mouth once daily. 90 tablet 0    metFORMIN (GLUCOPHAGE-XR) 500 MG ER 24hr tablet Take 1 tablet (500 mg total) by mouth daily with breakfast. (Patient not taking: Reported on 7/25/2025) 90 tablet 1    nitroGLYCERIN (NITROSTAT) 0.4 MG SL tablet Place 1 tablet (0.4 mg total) under the tongue every 5 (five) minutes as needed for Chest pain. 25 tablet 2    potassium chloride SA (K-DUR,KLOR-CON M) 10 MEQ tablet Take 1 tablet (10 mEq total) by mouth once daily. 90 tablet 0    tirzepatide (MOUNJARO) 2.5 mg/0.5 mL PnIj Inject 2.5 mg into the skin every 7 days. 4 Pen 0     No current facility-administered medications for this visit.

## 2025-07-25 NOTE — TELEPHONE ENCOUNTER
----- Message from Emma sent at 7/25/2025 11:01 AM CDT -----  Werner Patel is referring this patient to [facility]. The system is not letting me schedule a ECA.   Please call [phone] for a three month appointment  htn.

## (undated) DEVICE — TUBE CULTURE AMIES 220117

## (undated) DEVICE — SPONGE KERLIX SUPER   NON25854

## (undated) DEVICE — SPONGE GAUZE 10S 4X4  442214

## (undated) DEVICE — PAD ABD 5X9   7196D

## (undated) DEVICE — CUFF TOURNIQUET 24DUAL PRT 5921-024-235

## (undated) DEVICE — DRAIN PENROSE STERILE 12X1/4

## (undated) DEVICE — SHOE POST OP MALE LARGE

## (undated) DEVICE — PAD BOVIE ADULT

## (undated) DEVICE — BANDAGE KERLIX   441106

## (undated) DEVICE — CUFF TOURNIQUET 18DUAL PRT 5921-218-235

## (undated) DEVICE — PULSAEVAC 0210-158-000

## (undated) DEVICE — TRAY SKIN PREP DRY

## (undated) DEVICE — UNDERGLOVE BIOGEL PI MICRO BLUE SZ 7.5

## (undated) DEVICE — TRAY LOWER EXTREMITY  SMHS029-05

## (undated) DEVICE — BANDAGE ACE STERILE 4 REB3114

## (undated) DEVICE — SOLUTION IRRI NS BOTTLE 1000ML R5200-01

## (undated) DEVICE — SOLUTION SCRUB IODINE 4OZ

## (undated) DEVICE — SUTURE SILK 2-0 18 A185H

## (undated) DEVICE — BANDAGE ESMARK 4X9 55514

## (undated) DEVICE — GLOVE BIOGEL PI  GOLD SZ 7

## (undated) DEVICE — STRIP PACKING AMD 1/2 7832AMD

## (undated) DEVICE — SUTURE VICRYL 3-0 SH 27 VCP416H

## (undated) DEVICE — GLOVE BIOGEL PI ULTRA TOUCH GRAY SZ7.5

## (undated) DEVICE — Device

## (undated) DEVICE — TRAY GENERAL SURGERY